# Patient Record
Sex: FEMALE | Race: WHITE | NOT HISPANIC OR LATINO | Employment: UNEMPLOYED | URBAN - METROPOLITAN AREA
[De-identification: names, ages, dates, MRNs, and addresses within clinical notes are randomized per-mention and may not be internally consistent; named-entity substitution may affect disease eponyms.]

---

## 2017-04-18 ENCOUNTER — TELEPHONE (OUTPATIENT)
Dept: OBSTETRICS AND GYNECOLOGY | Facility: CLINIC | Age: 32
End: 2017-04-18

## 2017-04-18 NOTE — TELEPHONE ENCOUNTER
----- Message from Chantelle Cooper sent at 4/18/2017 10:03 AM CDT -----  Contact: Patient  X _1st Request  _  2nd Request  _  3rd Request    Who:DIONNE GARZON [[id number]]    Why:Patient states she is at the end of her cycle and needs to know if she can still can be seen     What Number to Call Back:[phone]  When to Expect a call back: (Before the end of the day)   -- if call after 3:00 call back will be tomorrow.

## 2017-04-18 NOTE — TELEPHONE ENCOUNTER
Returned patients called and she informed me that she would like to reschedule her appointment for a later date and that she would call back once she looks at her schedule.

## 2018-02-21 ENCOUNTER — OFFICE VISIT (OUTPATIENT)
Dept: OBSTETRICS AND GYNECOLOGY | Facility: CLINIC | Age: 33
End: 2018-02-21
Payer: COMMERCIAL

## 2018-02-21 VITALS
HEIGHT: 60 IN | DIASTOLIC BLOOD PRESSURE: 68 MMHG | BODY MASS INDEX: 21.99 KG/M2 | SYSTOLIC BLOOD PRESSURE: 102 MMHG | WEIGHT: 112 LBS

## 2018-02-21 DIAGNOSIS — F32.81 PMDD (PREMENSTRUAL DYSPHORIC DISORDER): ICD-10-CM

## 2018-02-21 DIAGNOSIS — Z01.419 WOMEN'S ANNUAL ROUTINE GYNECOLOGICAL EXAMINATION: Primary | ICD-10-CM

## 2018-02-21 DIAGNOSIS — Z12.4 ENCOUNTER FOR PAPANICOLAOU SMEAR FOR CERVICAL CANCER SCREENING: ICD-10-CM

## 2018-02-21 DIAGNOSIS — Z80.3 FAMILY HISTORY OF BREAST CANCER: ICD-10-CM

## 2018-02-21 DIAGNOSIS — Z11.51 SCREENING FOR HPV (HUMAN PAPILLOMAVIRUS): ICD-10-CM

## 2018-02-21 PROCEDURE — 99999 PR PBB SHADOW E&M-EST. PATIENT-LVL III: CPT | Mod: PBBFAC,,, | Performed by: NURSE PRACTITIONER

## 2018-02-21 PROCEDURE — 88175 CYTOPATH C/V AUTO FLUID REDO: CPT

## 2018-02-21 PROCEDURE — 99395 PREV VISIT EST AGE 18-39: CPT | Mod: S$GLB,,, | Performed by: NURSE PRACTITIONER

## 2018-02-21 PROCEDURE — 87624 HPV HI-RISK TYP POOLED RSLT: CPT

## 2018-02-21 RX ORDER — DROSPIRENONE AND ETHINYL ESTRADIOL 0.03MG-3MG
1 KIT ORAL DAILY
Qty: 90 TABLET | Refills: 3 | Status: SHIPPED | OUTPATIENT
Start: 2018-02-21 | End: 2018-11-06

## 2018-02-21 NOTE — PROGRESS NOTES
CC: Annual  HPI: Pt is a 32 y.o.  female who presents for routine annual exam. She uses BTL for contraception. She does not want STD screening.  Reports depression and irritability 1 week prior to onset of menses which resolves once her cycle has ended.  Reports this seems to be getting harder for her and her family to manage/  Reports her cycles are much heavier since her BTL.  Reports ovulation pain as well since BTL.   She is a non- smoker.  Denies history of HTN, blood clots, or stroke.  Reports + family history of breast cancer- maternal grandmother ( at diagnosis) and maternal aunt with premenopausal diagnosis.  Pt desires genetic consult.       ROS:  GENERAL: Feeling well overall. Denies fever or chills.   SKIN: Denies rash or lesions.   HEAD: Denies head injury or headache.   NODES: Denies enlarged lymph nodes.   CHEST: Denies chest pain or shortness of breath.   CARDIOVASCULAR: Denies palpitations or left sided chest pain.   ABDOMEN: No abdominal pain, constipation, diarrhea, nausea, vomiting or rectal bleeding.   URINARY: No dysuria, hematuria, or burning on urination.  REPRODUCTIVE: See HPI.   BREASTS: Denies pain, lumps, or nipple discharge.   HEMATOLOGIC: No easy bruisability or excessive bleeding.   MUSCULOSKELETAL: Denies joint pain or swelling.   NEUROLOGIC: Denies syncope or weakness.   PSYCHIATRIC: Denies depression, anxiety or mood swings.    PE:   APPEARANCE: Well nourished, well developed, White female in no acute distress.  NODES: no cervical, supraclavicular, or inguinal lymphadenopathy  BREASTS: Symmetrical, no skin changes or visible lesions. No palpable masses, nipple discharge or adenopathy bilaterally.  ABDOMEN: Soft. No tenderness or masses. No distention. No hernias palpated. No CVA tenderness.  VULVA: No lesions. Normal external female genitalia.  URETHRAL MEATUS: Normal size and location, no lesions, no prolapse.  URETHRA: No masses, tenderness, or prolapse.  VAGINA: Moist. No  lesions or lacerations noted. No abnormal discharge present. No odor present.   CERVIX: No lesions or discharge. No cervical motion tenderness.   UTERUS: Normal size, regular shape, mobile, non-tender.  ADNEXA: No tenderness. No fullness or masses palpated in the adnexal regions.   ANUS PERINEUM: Normal.      Diagnosis:  1. Women's annual routine gynecological examination    2. Screening for HPV (human papillomavirus)    3. Encounter for Papanicolaou smear for cervical cancer screening    4. PMDD (premenstrual dysphoric disorder)    5. Family history of breast cancer        Plan:     Orders Placed This Encounter    HPV High Risk Genotypes, PCR    Ambulatory Referral to [facility]    Liquid-based pap smear, screening    drospirenone-ethinyl estradiol (SILVIA) 3-0.03 mg per tablet     Plan:   Pap  HPV  Discussed continuous OCPs for 3 months and then to have a cycle to have less cycles per year d/t depression with menstruation.    Offered SSRI to help with PMDD symptoms- pt declines at present  Referral to [facility] with Dr. Sood for genetics consult        Patient was counseled today on the new ACS guidelines for cervical cytology screening as well as the current recommendations for breast cancer screening. She was counseled to follow up with her PCP for other routine health maintenance. Counseling session lasted approximately 10 minutes, and all her questions were answered.    Follow-up with me in 1 year for routine exam    MORRIS Weiss

## 2018-02-22 ENCOUNTER — TELEPHONE (OUTPATIENT)
Dept: OBSTETRICS AND GYNECOLOGY | Facility: CLINIC | Age: 33
End: 2018-02-22

## 2018-02-22 NOTE — TELEPHONE ENCOUNTER
Called Shena Gomez  to schedule an appointment for Genetic Counseling/Testing as suggested by HERMAN Taylor NP.  Patient agreeable to  April 19th at 11am.      Explained to Shena her appointment will last about an hour and during that time Dr. Sood will review her medical history as well as her family medical history, especially any breast, ovarian or colon cancers.  She should be familiar with the type of cancer each family member had as well as the age at which they were diagnosed.  If she has access to medical records pertaining to their cancer diagnosis, testing, treatment and/or recurrence that would be helpful to bring to the appointment.  Explained that we would then send her information to an outside company called [employer] who would have a genetic counselor contact her to continue the process.  Pt verbalized understanding.    Reminder letter printed and sent via mail.

## 2018-02-26 LAB
HPV16 AG SPEC QL: NEGATIVE
HPV16+18+H RISK 12 DNA CVX-IMP: NEGATIVE
HPV18 DNA SPEC QL NAA+PROBE: NEGATIVE

## 2018-04-09 ENCOUNTER — PATIENT MESSAGE (OUTPATIENT)
Dept: OBSTETRICS AND GYNECOLOGY | Facility: CLINIC | Age: 33
End: 2018-04-09

## 2018-04-09 DIAGNOSIS — F32.81 PMDD (PREMENSTRUAL DYSPHORIC DISORDER): Primary | ICD-10-CM

## 2018-04-09 RX ORDER — FLUOXETINE 10 MG/1
10 CAPSULE ORAL DAILY
Qty: 30 CAPSULE | Refills: 11 | Status: SHIPPED | OUTPATIENT
Start: 2018-04-09 | End: 2018-11-06

## 2018-07-10 ENCOUNTER — PATIENT MESSAGE (OUTPATIENT)
Dept: OBSTETRICS AND GYNECOLOGY | Facility: CLINIC | Age: 33
End: 2018-07-10

## 2018-07-10 DIAGNOSIS — R30.0 DYSURIA: Primary | ICD-10-CM

## 2018-07-10 RX ORDER — NITROFURANTOIN 25; 75 MG/1; MG/1
100 CAPSULE ORAL 2 TIMES DAILY
Qty: 14 CAPSULE | Refills: 0 | Status: SHIPPED | OUTPATIENT
Start: 2018-07-10 | End: 2018-07-17

## 2018-11-04 ENCOUNTER — PATIENT MESSAGE (OUTPATIENT)
Dept: OBSTETRICS AND GYNECOLOGY | Facility: CLINIC | Age: 33
End: 2018-11-04

## 2018-11-04 DIAGNOSIS — R10.2 PELVIC PAIN: Primary | ICD-10-CM

## 2018-11-05 NOTE — TELEPHONE ENCOUNTER
Placed order for pelvic US per pt's request and after speaking with Adalgisa. Scheduled on 11/15 at 9:45am with Adalgisa's appt afterwards at 11:20am. Will contact pt about us appt.

## 2018-11-06 ENCOUNTER — TELEPHONE (OUTPATIENT)
Dept: OBSTETRICS AND GYNECOLOGY | Facility: CLINIC | Age: 33
End: 2018-11-06

## 2018-11-06 ENCOUNTER — OFFICE VISIT (OUTPATIENT)
Dept: PRIMARY CARE CLINIC | Facility: CLINIC | Age: 33
End: 2018-11-06
Payer: COMMERCIAL

## 2018-11-06 VITALS
SYSTOLIC BLOOD PRESSURE: 96 MMHG | HEIGHT: 60 IN | WEIGHT: 107.13 LBS | TEMPERATURE: 98 F | HEART RATE: 95 BPM | BODY MASS INDEX: 21.03 KG/M2 | DIASTOLIC BLOOD PRESSURE: 61 MMHG | RESPIRATION RATE: 18 BRPM | OXYGEN SATURATION: 97 %

## 2018-11-06 DIAGNOSIS — J32.9 SINUSITIS, UNSPECIFIED CHRONICITY, UNSPECIFIED LOCATION: ICD-10-CM

## 2018-11-06 DIAGNOSIS — Z00.00 ROUTINE PHYSICAL EXAMINATION: ICD-10-CM

## 2018-11-06 DIAGNOSIS — F41.9 ANXIETY: Primary | ICD-10-CM

## 2018-11-06 DIAGNOSIS — Z13.220 LIPID SCREENING: ICD-10-CM

## 2018-11-06 PROCEDURE — 99214 OFFICE O/P EST MOD 30 MIN: CPT | Mod: S$GLB,,, | Performed by: NURSE PRACTITIONER

## 2018-11-06 PROCEDURE — 99999 PR PBB SHADOW E&M-EST. PATIENT-LVL IV: CPT | Mod: PBBFAC,,, | Performed by: NURSE PRACTITIONER

## 2018-11-06 PROCEDURE — 3008F BODY MASS INDEX DOCD: CPT | Mod: CPTII,S$GLB,, | Performed by: NURSE PRACTITIONER

## 2018-11-06 RX ORDER — AZITHROMYCIN 250 MG/1
TABLET, FILM COATED ORAL
Qty: 6 TABLET | Refills: 0 | Status: SHIPPED | OUTPATIENT
Start: 2018-11-06 | End: 2018-11-11

## 2018-11-06 NOTE — PROGRESS NOTES
Chief Complaint  Chief Complaint   Patient presents with    Dizziness    Sinus Problem    loss of appetite       HPI  Shena Gomez is a 33 y.o. female with multiple medical diagnoses as listed in the medical history and problem list that presents for dizziness, sinus pressure.    Dizziness-patient reports a decreased appetite over the last 3 days and intermittent dizziness.  States that her most recent episode was yesterday when she noticed that she was starting to feel dizzy and lightheaded.  Called her  partner who is ENT who checked her blood sugar which was fine her blood pressure which was also fine.  Reports decreased appetite accompanying fatigue.  Patient also reports intermittent sinus pressure and chronic sinusitis.  Is concerned that her sinus pressures causing dizziness and lightheadedness.  No associated viral URI symptoms.  No sore throat, cough, nausea, vomiting, diarrhea.  No abdominal pain. No sick contacts.  Patient with history of anxiety-OCD-patient previously placed on Zoloft but did not ever start because she did not want to take a medication daily.  Reports increased stressors with children starting school, started a new job.  No family support.  No dysphoria, anhedonia, withdrawal.  No suicidal homicidal ideation.  Of note-patient with a history of anemia in 2013 in 2014.  No recent lab work.     Pre menstrual dysphoric disorder-patient with a history of severe mood swings, irritability surrounding menstrual period monthly.  Describes painful, heavy periods associated with ovulation and menstruation.  Previously placed on Prozac 10 mg by her Ob but did not ever start due to questionable side effects.  Questioning whether not she should start at this point.  Was also previously placed on oral contraceptive pill but stopped due to questionable side effects.      PAST MEDICAL HISTORY:  History reviewed. No pertinent past medical history.    PAST SURGICAL HISTORY:  Past Surgical History:    Procedure Laterality Date    CERCLAGE, CERVIX N/A 2013    Performed by Bossman Kowalski [facility], [facility] at [facility]     SECTION       SECTION N/A 2014    Performed by Barrera Martínez Jr., MD at [facility] L[facility]D       SOCIAL HISTORY:  Social History     Socioeconomic History    Marital status:      Spouse name: Not on file    Number of children: Not on file    Years of education: Not on file    Highest education level: Not on file   Social Needs    Financial resource strain: Not on file    Food insecurity - worry: Not on file    Food insecurity - inability: Not on file    Transportation needs - medical: Not on file    Transportation needs - non-medical: Not on file   Occupational History    Not on file   Tobacco Use    Smoking status: Never Smoker    Smokeless tobacco: Never Used   Substance and Sexual Activity    Alcohol use: No    Drug use: No    Sexual activity: Yes     Partners: Male     Birth control/protection: None   Other Topics Concern    Not on file   Social History Narrative    Not on file       FAMILY HISTORY:  Family History   Problem Relation Age of Onset    Breast cancer Maternal Grandmother     Cancer Neg Hx     Colon cancer Neg Hx     Diabetes Neg Hx     Eclampsia Neg Hx     Miscarriages / Stillbirths Neg Hx     Hypertension Neg Hx     Ovarian cancer Neg Hx      labor Neg Hx     Stroke Neg Hx        ALLERGIES AND MEDICATIONS: updated and reviewed.  Review of patient's allergies indicates:   Allergen Reactions    Celestone [betamethasone] Palpitations     Current Outpatient Medications   Medication Sig Dispense Refill    azithromycin (Z-GEE) 250 MG tablet Take 2 tablets by mouth on day 1; Take 1 tablet by mouth on days 2-5 6 tablet 0     No current facility-administered medications for this visit.          ROS  Review of Systems   Constitutional: Positive for fatigue. Negative for chills and fever.   HENT: Positive for sinus pressure.  Negative for congestion, rhinorrhea and sore throat.    Respiratory: Negative for cough, chest tightness and shortness of breath.    Cardiovascular: Negative for chest pain and palpitations.   Gastrointestinal: Negative for blood in stool, diarrhea, nausea and vomiting.   Genitourinary: Positive for menstrual problem. Negative for dysuria, frequency, hematuria and urgency.   Musculoskeletal: Negative for arthralgias and back pain.   Skin: Negative for rash and wound.   Neurological: Positive for dizziness and light-headedness. Negative for headaches.   Psychiatric/Behavioral: Negative for dysphoric mood and sleep disturbance. The patient is nervous/anxious.          PHYSICAL EXAM  Vitals:    11/06/18 1458   BP: 96/61   BP Location: Right arm   Patient Position: Sitting   BP Method: Medium (Automatic)   Pulse: 95   Resp: 18   Temp: 98.3 °F (36.8 °C)   TempSrc: Oral   SpO2: 97%   Weight: 48.6 kg (107 lb 1.6 oz)   Height: 5' (1.524 m)    Body mass index is 20.92 kg/m².  Weight: 48.6 kg (107 lb 1.6 oz)   Height: 5' (152.4 cm)     Physical Exam   Constitutional: She is oriented to person, place, and time. She appears well-developed and well-nourished.   HENT:   Head: Normocephalic.   Right Ear: Tympanic membrane normal.   Left Ear: Tympanic membrane normal.   Mouth/Throat: Uvula is midline, oropharynx is clear and moist and mucous membranes are normal.   Eyes: Conjunctivae are normal.   Cardiovascular: Normal rate, regular rhythm, normal heart sounds and normal pulses.   No murmur heard.  Pulses:       Radial pulses are 2+ on the right side, and 2+ on the left side.   No LE swelling noted   Pulmonary/Chest: Effort normal and breath sounds normal. She has no wheezes.   Abdominal: Soft. Bowel sounds are normal. There is no tenderness.   Musculoskeletal: She exhibits no edema.   Lymphadenopathy:     She has no cervical adenopathy.   Neurological: She is alert and oriented to person, place, and time.   Skin: Skin is warm and  dry. No rash noted.   Psychiatric: She has a normal mood and affect.         Health Maintenance       Date Due Completion Date    Lipid Panel 1986 ---    TETANUS VACCINE 08/06/2004 ---    Influenza Vaccine 08/01/2018 11/18/2013    Pap Smear with HPV Cotest 02/21/2021 2/21/2018            Assessment & Plan    Shena was seen today for dizziness, sinus problem and loss of appetite.    Diagnoses and all orders for this visit:    Anxiety  -     TSH; Future  -     T4, free; Future  Discussed with patient my concern that her lightheadedness and dizziness is likely related to anxiety.  We agreed that we will treat her for sinusitis and then go from there.    Routine physical examination  -     Comprehensive metabolic panel; Future  -     CBC auto differential; Future    Sinusitis, unspecified chronicity, unspecified location  -     azithromycin (Z-GEE) 250 MG tablet; Take 2 tablets by mouth on day 1; Take 1 tablet by mouth on days 2-5  Discussed with patient that it is reasonable to treat her sinusitis with an antibiotic to determine its relationship to her new onset dizziness and lightheadedness.    Lipid screening  -     Lipid panel; Future          Follow-up: Follow-up in about 4 weeks (around 12/4/2018).

## 2018-11-07 ENCOUNTER — PATIENT MESSAGE (OUTPATIENT)
Dept: PRIMARY CARE CLINIC | Facility: CLINIC | Age: 33
End: 2018-11-07

## 2018-11-08 ENCOUNTER — TELEPHONE (OUTPATIENT)
Dept: PRIMARY CARE CLINIC | Facility: CLINIC | Age: 33
End: 2018-11-08

## 2018-11-08 ENCOUNTER — PATIENT MESSAGE (OUTPATIENT)
Dept: PRIMARY CARE CLINIC | Facility: CLINIC | Age: 33
End: 2018-11-08

## 2018-11-08 NOTE — TELEPHONE ENCOUNTER
----- Message from Soheila Herrera sent at 11/8/2018  9:57 AM CST -----  Contact: self  Type:  Test Results    Who Called:  self  Name of Test (Lab/Mammo/Etc):  Urine test  Date of Test:  11/07/18  Ordering Provider:  Marisel Mcdaniels  Where the test was performed:  [facility]  Best Call Back Number:  [phone]  Additional Information:  Please call patient with results. Thanks!

## 2018-11-08 NOTE — TELEPHONE ENCOUNTER
Spoke with patient notified her that as soon as we get the results of her urine culture I will let her know. We will call her Monday at the latest. States she is having kidney pain. Advised her if she develops a fever or severe pain she needs to go to the ER. States her understanding

## 2018-11-08 NOTE — TELEPHONE ENCOUNTER
----- Message from Tanesha Hwang sent at 11/8/2018 12:39 PM CST -----  Contact: Patient  Type:  Test Results    Who Called:  Shena, patient  Name of Test (Lab/Mammo/Etc):  Urinalysis  Date of Test:  11/07/2018  Ordering Provider:  Marisel Mcdaniels NP  Where the test was performed:  Office  Best Call Back Number:  [phone]  Additional Information:  Please call her. Thanks.

## 2018-11-13 ENCOUNTER — PATIENT MESSAGE (OUTPATIENT)
Dept: PRIMARY CARE CLINIC | Facility: CLINIC | Age: 33
End: 2018-11-13

## 2018-11-13 NOTE — TELEPHONE ENCOUNTER
Spoke with patient discussed her symptoms.  Complains of epigastric burning below the sternum above the umbilicus.  Associated belching.  Clear reports occasional constipation.  Started on Zantac 300 mg q.h.s. x1 week to evaluate for improvement.

## 2019-01-17 ENCOUNTER — PATIENT MESSAGE (OUTPATIENT)
Dept: OBSTETRICS AND GYNECOLOGY | Facility: CLINIC | Age: 34
End: 2019-01-17

## 2019-01-23 ENCOUNTER — PATIENT MESSAGE (OUTPATIENT)
Dept: OBSTETRICS AND GYNECOLOGY | Facility: CLINIC | Age: 34
End: 2019-01-23

## 2019-01-23 DIAGNOSIS — R30.0 DYSURIA: Primary | ICD-10-CM

## 2019-01-25 ENCOUNTER — PATIENT MESSAGE (OUTPATIENT)
Dept: OBSTETRICS AND GYNECOLOGY | Facility: CLINIC | Age: 34
End: 2019-01-25

## 2019-01-25 NOTE — TELEPHONE ENCOUNTER
Spoke with pt pt stated she was passing kidney stones and wanted to know if she should follow up with her pcp. Informed pt that if she was in pain she can follow up with her pcp or urology. Pt stated she was not in pain and was just going to let it pass because she did not want to go see a doctor and rack up on medical bills. Pt stated she wanted to speak to Adalgisa Davison. Informed pt that Adalgisa was out of the office today. Pt stated she will wait for adalgisa call when ever she come back.

## 2019-01-28 ENCOUNTER — TELEPHONE (OUTPATIENT)
Dept: OBSTETRICS AND GYNECOLOGY | Facility: CLINIC | Age: 34
End: 2019-01-28

## 2019-01-29 ENCOUNTER — PATIENT MESSAGE (OUTPATIENT)
Dept: PRIMARY CARE CLINIC | Facility: CLINIC | Age: 34
End: 2019-01-29

## 2019-01-29 DIAGNOSIS — N20.0 NEPHROLITHIASIS: Primary | ICD-10-CM

## 2019-01-29 NOTE — TELEPHONE ENCOUNTER
I would be happy to order a renal ultrasound. This is non-invasive and will determine if any additional stones are left in the kidney to pass. I have ordered. We will call you to schedule.

## 2019-01-29 NOTE — TELEPHONE ENCOUNTER
Spoke with patient and explained that Marisel has ordered the u/s . She stated that she will just give scheduling a call tomorrow in regards to trying to schedule both the u/s that Marisel ordered and the u/s that her gynecologist ordered possibly the same time.

## 2019-01-30 ENCOUNTER — PATIENT MESSAGE (OUTPATIENT)
Dept: OBSTETRICS AND GYNECOLOGY | Facility: CLINIC | Age: 34
End: 2019-01-30

## 2019-01-31 ENCOUNTER — TELEPHONE (OUTPATIENT)
Dept: OBSTETRICS AND GYNECOLOGY | Facility: CLINIC | Age: 34
End: 2019-01-31

## 2019-02-04 ENCOUNTER — PATIENT MESSAGE (OUTPATIENT)
Dept: OBSTETRICS AND GYNECOLOGY | Facility: CLINIC | Age: 34
End: 2019-02-04

## 2019-02-05 ENCOUNTER — TELEPHONE (OUTPATIENT)
Dept: OBSTETRICS AND GYNECOLOGY | Facility: CLINIC | Age: 34
End: 2019-02-05

## 2019-02-05 ENCOUNTER — PATIENT MESSAGE (OUTPATIENT)
Dept: PRIMARY CARE CLINIC | Facility: CLINIC | Age: 34
End: 2019-02-05

## 2019-02-05 DIAGNOSIS — N13.30 PYELOCALIECTASIS: Primary | ICD-10-CM

## 2019-02-05 NOTE — TELEPHONE ENCOUNTER
----- Message from Mahnaz Tripathi sent at 2/5/2019  3:06 PM CST -----  Contact: self   Patient is needing to schedule her wwe visit. Her pelvic US revealed multiple uterine fibroids. The patient was advised to be seen by  to further discuss her results. The patient can be reached at [phone].      Spoke with patient to schedule patient annual. Patient verbalized and understand

## 2019-02-05 NOTE — TELEPHONE ENCOUNTER
Called pt to discuss her pelvic US revealed multiple uterine fibroids.  Discussed no abnormalities seen on the uterus.  Discussed the fibroids may be the source of the pelvic pain.  Pt to see Dr. Sood for annual exam and will discuss with her further.

## 2019-02-06 ENCOUNTER — PATIENT MESSAGE (OUTPATIENT)
Dept: OBSTETRICS AND GYNECOLOGY | Facility: CLINIC | Age: 34
End: 2019-02-06

## 2019-02-06 ENCOUNTER — PATIENT MESSAGE (OUTPATIENT)
Dept: PRIMARY CARE CLINIC | Facility: CLINIC | Age: 34
End: 2019-02-06

## 2019-02-06 ENCOUNTER — TELEPHONE (OUTPATIENT)
Dept: OBSTETRICS AND GYNECOLOGY | Facility: CLINIC | Age: 34
End: 2019-02-06

## 2019-02-06 DIAGNOSIS — N20.0 NEPHROLITHIASIS: Primary | ICD-10-CM

## 2019-02-06 RX ORDER — TAMSULOSIN HYDROCHLORIDE 0.4 MG/1
0.4 CAPSULE ORAL DAILY
Qty: 30 CAPSULE | Refills: 11 | Status: SHIPPED | OUTPATIENT
Start: 2019-02-06 | End: 2019-02-19 | Stop reason: SDUPTHER

## 2019-02-08 ENCOUNTER — PATIENT MESSAGE (OUTPATIENT)
Dept: PRIMARY CARE CLINIC | Facility: CLINIC | Age: 34
End: 2019-02-08

## 2019-02-11 ENCOUNTER — TELEPHONE (OUTPATIENT)
Dept: OBSTETRICS AND GYNECOLOGY | Facility: CLINIC | Age: 34
End: 2019-02-11

## 2019-02-11 ENCOUNTER — PATIENT MESSAGE (OUTPATIENT)
Dept: OBSTETRICS AND GYNECOLOGY | Facility: CLINIC | Age: 34
End: 2019-02-11

## 2019-02-12 ENCOUNTER — PATIENT MESSAGE (OUTPATIENT)
Dept: PRIMARY CARE CLINIC | Facility: CLINIC | Age: 34
End: 2019-02-12

## 2019-02-12 ENCOUNTER — PATIENT MESSAGE (OUTPATIENT)
Dept: OBSTETRICS AND GYNECOLOGY | Facility: CLINIC | Age: 34
End: 2019-02-12

## 2019-02-12 NOTE — TELEPHONE ENCOUNTER
Spoke with patient in regards to CT scan results. Patient verbalized understanding. Patient just picked up the prescription for Flomax. Do you still want her to take it? Please advise.

## 2019-02-13 ENCOUNTER — PATIENT MESSAGE (OUTPATIENT)
Dept: PRIMARY CARE CLINIC | Facility: CLINIC | Age: 34
End: 2019-02-13

## 2019-02-15 ENCOUNTER — PATIENT MESSAGE (OUTPATIENT)
Dept: PRIMARY CARE CLINIC | Facility: CLINIC | Age: 34
End: 2019-02-15

## 2019-02-19 ENCOUNTER — OFFICE VISIT (OUTPATIENT)
Dept: PRIMARY CARE CLINIC | Facility: CLINIC | Age: 34
End: 2019-02-19
Payer: COMMERCIAL

## 2019-02-19 VITALS
RESPIRATION RATE: 16 BRPM | HEART RATE: 83 BPM | OXYGEN SATURATION: 99 % | TEMPERATURE: 98 F | BODY MASS INDEX: 21.2 KG/M2 | HEIGHT: 60 IN | DIASTOLIC BLOOD PRESSURE: 61 MMHG | SYSTOLIC BLOOD PRESSURE: 97 MMHG | WEIGHT: 108 LBS

## 2019-02-19 DIAGNOSIS — R07.9 CHEST PAIN, UNSPECIFIED TYPE: Primary | ICD-10-CM

## 2019-02-19 DIAGNOSIS — R10.9 FLANK PAIN, ACUTE: ICD-10-CM

## 2019-02-19 DIAGNOSIS — N20.0 NEPHROLITHIASIS: ICD-10-CM

## 2019-02-19 DIAGNOSIS — S16.1XXA ACUTE STRAIN OF NECK MUSCLE, INITIAL ENCOUNTER: ICD-10-CM

## 2019-02-19 LAB
BILIRUB SERPL-MCNC: NEGATIVE MG/DL
BLOOD URINE, POC: ABNORMAL
COLOR, POC UA: YELLOW
GLUCOSE UR QL STRIP: NEGATIVE
KETONES UR QL STRIP: NEGATIVE
LEUKOCYTE ESTERASE URINE, POC: ABNORMAL
NITRITE, POC UA: NEGATIVE
PH, POC UA: 7
PROTEIN, POC: NEGATIVE
SPECIFIC GRAVITY, POC UA: 1
UROBILINOGEN, POC UA: NEGATIVE

## 2019-02-19 PROCEDURE — 99999 PR PBB SHADOW E&M-EST. PATIENT-LVL IV: CPT | Mod: PBBFAC,,, | Performed by: NURSE PRACTITIONER

## 2019-02-19 PROCEDURE — 3008F BODY MASS INDEX DOCD: CPT | Mod: CPTII,S$GLB,, | Performed by: NURSE PRACTITIONER

## 2019-02-19 PROCEDURE — 93000 EKG 12-LEAD: ICD-10-PCS | Mod: S$GLB,,, | Performed by: INTERNAL MEDICINE

## 2019-02-19 PROCEDURE — 99214 OFFICE O/P EST MOD 30 MIN: CPT | Mod: 25,S$GLB,, | Performed by: NURSE PRACTITIONER

## 2019-02-19 PROCEDURE — 3008F PR BODY MASS INDEX (BMI) DOCUMENTED: ICD-10-PCS | Mod: CPTII,S$GLB,, | Performed by: NURSE PRACTITIONER

## 2019-02-19 PROCEDURE — 99214 PR OFFICE/OUTPT VISIT, EST, LEVL IV, 30-39 MIN: ICD-10-PCS | Mod: 25,S$GLB,, | Performed by: NURSE PRACTITIONER

## 2019-02-19 PROCEDURE — 99999 PR PBB SHADOW E&M-EST. PATIENT-LVL IV: ICD-10-PCS | Mod: PBBFAC,,, | Performed by: NURSE PRACTITIONER

## 2019-02-19 PROCEDURE — 93000 ELECTROCARDIOGRAM COMPLETE: CPT | Mod: S$GLB,,, | Performed by: INTERNAL MEDICINE

## 2019-02-19 PROCEDURE — 81002 URINALYSIS NONAUTO W/O SCOPE: CPT | Mod: S$GLB,,, | Performed by: NURSE PRACTITIONER

## 2019-02-19 PROCEDURE — 81002 POCT URINE DIPSTICK WITHOUT MICROSCOPE: ICD-10-PCS | Mod: S$GLB,,, | Performed by: NURSE PRACTITIONER

## 2019-02-19 RX ORDER — TAMSULOSIN HYDROCHLORIDE 0.4 MG/1
0.4 CAPSULE ORAL DAILY
Qty: 30 CAPSULE | Refills: 11 | Status: SHIPPED | OUTPATIENT
Start: 2019-02-19 | End: 2019-12-18

## 2019-02-19 RX ORDER — CYCLOBENZAPRINE HCL 5 MG
5 TABLET ORAL 3 TIMES DAILY PRN
Qty: 28 TABLET | Refills: 0 | Status: SHIPPED | OUTPATIENT
Start: 2019-02-19 | End: 2019-02-27

## 2019-02-19 NOTE — PROGRESS NOTES
Chief Complaint  Chief Complaint   Patient presents with    Flank Pain     Pt. reports left flank pain that starts in the abdomen and radiates to flank.     Headache     Left sided pain in head that radiates into neck.       HPI    Shena Gomez is a 33 y.o. female that presents for right-sided flank pain, chest pain.    Right-sided flank pain-patient with previous reports of right-sided flank pain approximately 2 days ago, back pain with a history kidney stones in the past.  Last kidney since she passes approximately 2 weeks ago.  Presents to clinic reporting umbilical pain radiating to the right flank.  Pain rated as mild, aching, gnawing.  Intermittent.  No suprapubic pressure or UTI symptoms.  No fever or chills.      Left chest pain-patient complaining of left-sided headache radiating down left arm all the way to left hand.  Reports intermittent numbness and left fingers.  Also complaining of left-sided chest pain. Chest pain described as pressure is with no exacerbating or alleviating factors.  Chest and neck pain and generally associated in described as constant.  Patient denies shortness of breath or palpitations.    PAST MEDICAL HISTORY:  History reviewed. No pertinent past medical history.    PAST SURGICAL HISTORY:  Past Surgical History:   Procedure Laterality Date    CERCLAGE, CERVIX N/A 2013    Performed by Bossman Kowalski III, [facility] at [facility]     SECTION       SECTION N/A 2014    Performed by Barrera Martínez Jr., MD at [facility] [place] operated by [facility] L&D       SOCIAL HISTORY:  Social History     Socioeconomic History    Marital status:      Spouse name: Not on file    Number of children: Not on file    Years of education: Not on file    Highest education level: Not on file   Social Needs    Financial resource strain: Not on file    Food insecurity - worry: Not on file    Food insecurity - inability: Not on file    Transportation needs - medical: Not on file    Transportation needs -  non-medical: Not on file   Occupational History    Not on file   Tobacco Use    Smoking status: Never Smoker    Smokeless tobacco: Never Used   Substance and Sexual Activity    Alcohol use: No    Drug use: No    Sexual activity: Yes     Partners: Male     Birth control/protection: None   Other Topics Concern    Not on file   Social History Narrative    Not on file       FAMILY HISTORY:  Family History   Problem Relation Age of Onset    Breast cancer Maternal Grandmother     Cancer Neg Hx     Colon cancer Neg Hx     Diabetes Neg Hx     Eclampsia Neg Hx     Miscarriages / Stillbirths Neg Hx     Hypertension Neg Hx     Ovarian cancer Neg Hx      labor Neg Hx     Stroke Neg Hx        ALLERGIES AND MEDICATIONS: updated and reviewed.  Review of patient's allergies indicates:   Allergen Reactions    Celestone [betamethasone] Palpitations     Current Outpatient Medications   Medication Sig Dispense Refill    cyclobenzaprine (FLEXERIL) 5 MG tablet Take 1 tablet (5 mg total) by mouth 3 (three) times daily as needed for Muscle spasms. 28 tablet 0    tamsulosin (FLOMAX) 0.4 mg Cap Take 1 capsule (0.4 mg total) by mouth once daily. 30 capsule 11     No current facility-administered medications for this visit.          ROS  Review of Systems   Constitutional: Negative for chills and fever.   HENT: Negative for ear pain, postnasal drip and sinus pain.    Respiratory: Positive for chest tightness. Negative for cough and shortness of breath.    Cardiovascular: Positive for chest pain.   Gastrointestinal: Negative for diarrhea, nausea and vomiting.   Genitourinary: Positive for flank pain. Negative for difficulty urinating, dysuria, frequency and urgency.   Musculoskeletal: Positive for neck pain and neck stiffness.   Neurological: Positive for numbness and headaches. Negative for dizziness and weakness.           PHYSICAL EXAM  Vitals:    19 1447   BP: 97/61   BP Location: Right arm   Patient  Position: Sitting   BP Method: Medium (Automatic)   Pulse: 83   Resp: 16   Temp: 98.1 °F (36.7 °C)   TempSrc: Oral   SpO2: 99%   Weight: 49 kg (108 lb)   Height: 5' (1.524 m)    Body mass index is 21.09 kg/m².  Weight: 49 kg (108 lb)   Height: 5' (152.4 cm)     Physical Exam   Constitutional: She is oriented to person, place, and time. She appears well-developed and well-nourished.   HENT:   Head: Normocephalic.   Right Ear: Tympanic membrane normal.   Left Ear: Tympanic membrane normal.   Mouth/Throat: Uvula is midline, oropharynx is clear and moist and mucous membranes are normal.   Eyes: Conjunctivae are normal.   Cardiovascular: Normal rate, regular rhythm, normal heart sounds and normal pulses.   No murmur heard.  Pulses:       Radial pulses are 2+ on the right side, and 2+ on the left side.   No LE swelling noted   Pulmonary/Chest: Effort normal and breath sounds normal. She has no wheezes.   Abdominal: Soft. Bowel sounds are normal. There is no tenderness. There is CVA tenderness.   Musculoskeletal: She exhibits no edema.   Pain elicited with right lateral rotation of neck.    Lymphadenopathy:     She has no cervical adenopathy.   Neurological: She is alert and oriented to person, place, and time.   Skin: Skin is warm and dry. No rash noted.   Psychiatric: She has a normal mood and affect.         Health Maintenance       Date Due Completion Date    TETANUS VACCINE 08/06/2004 ---    Influenza Vaccine 08/01/2018 11/18/2013    Pap Smear with HPV Cotest 02/21/2021 2/21/2018            Assessment & Plan    Problem List Items Addressed This Visit        Unprioritized    Nephrolithiasis    Relevant Medications    tamsulosin (FLOMAX) 0.4 mg Cap      Other Visit Diagnoses     Chest pain, unspecified type    -  Primary    Relevant Orders    IN OFFICE EKG 12-LEAD (to Muse)    Flank pain, acute        Relevant Orders    POCT URINE DIPSTICK WITHOUT MICROSCOPE (Completed)    Acute strain of neck muscle, initial encounter         Relevant Medications    cyclobenzaprine (FLEXERIL) 5 MG tablet          Follow-up: Follow-up if symptoms worsen or fail to improve.    Marisel Mcdaniels    Medication List with Changes/Refills   New Medications    CYCLOBENZAPRINE (FLEXERIL) 5 MG TABLET    Take 1 tablet (5 mg total) by mouth 3 (three) times daily as needed for Muscle spasms.   Changed and/or Refilled Medications    Modified Medication Previous Medication    TAMSULOSIN (FLOMAX) 0.4 MG CAP tamsulosin (FLOMAX) 0.4 mg Cap       Take 1 capsule (0.4 mg total) by mouth once daily.    Take 1 capsule (0.4 mg total) by mouth once daily.   Discontinued Medications    RANITIDINE (ZANTAC) 300 MG TABLET    Take 1 tablet (300 mg total) by mouth every evening.

## 2019-02-27 ENCOUNTER — OFFICE VISIT (OUTPATIENT)
Dept: OBSTETRICS AND GYNECOLOGY | Facility: CLINIC | Age: 34
End: 2019-02-27
Attending: OBSTETRICS & GYNECOLOGY
Payer: COMMERCIAL

## 2019-02-27 VITALS
DIASTOLIC BLOOD PRESSURE: 72 MMHG | WEIGHT: 107.13 LBS | SYSTOLIC BLOOD PRESSURE: 110 MMHG | HEIGHT: 60 IN | BODY MASS INDEX: 21.03 KG/M2

## 2019-02-27 DIAGNOSIS — Z01.419 ENCOUNTER FOR GYNECOLOGICAL EXAMINATION WITHOUT ABNORMAL FINDING: Primary | ICD-10-CM

## 2019-02-27 DIAGNOSIS — F32.81 PMDD (PREMENSTRUAL DYSPHORIC DISORDER): ICD-10-CM

## 2019-02-27 DIAGNOSIS — D21.9 FIBROIDS: ICD-10-CM

## 2019-02-27 PROCEDURE — 99395 PR PREVENTIVE VISIT,EST,18-39: ICD-10-PCS | Mod: S$GLB,,, | Performed by: OBSTETRICS & GYNECOLOGY

## 2019-02-27 PROCEDURE — 99395 PREV VISIT EST AGE 18-39: CPT | Mod: S$GLB,,, | Performed by: OBSTETRICS & GYNECOLOGY

## 2019-02-27 RX ORDER — FLUOXETINE 10 MG/1
CAPSULE ORAL
Qty: 28 CAPSULE | Refills: 6 | Status: SHIPPED | OUTPATIENT
Start: 2019-02-27 | End: 2019-11-21

## 2019-02-27 NOTE — PROGRESS NOTES
SUBJECTIVE:   33 y.o. female   for annual routine   checkup. Patient's last menstrual period was 2019..  She reports that she is having issues with her mood, irritability, emotional and agitated easily the week before her cycle. She reports that she has OCD and it is worse with her period. She does not want to blunt her OCD. She has questions about her fibroids. She reports her periods are heavy on the first day and she has cramping. .        History reviewed. No pertinent past medical history.  Past Surgical History:   Procedure Laterality Date    CERCLAGE, CERVIX N/A 2013    Performed by Bossman Kowalski III, MD at [facility] L&D     SECTION       SECTION N/A 2014    Performed by Barrera Martínez Jr., MD at [facility] L&D     Social History     Socioeconomic History    Marital status:      Spouse name: Not on file    Number of children: Not on file    Years of education: Not on file    Highest education level: Not on file   Social Needs    Financial resource strain: Not on file    Food insecurity - worry: Not on file    Food insecurity - inability: Not on file    Transportation needs - medical: Not on file    Transportation needs - non-medical: Not on file   Occupational History    Not on file   Tobacco Use    Smoking status: Never Smoker    Smokeless tobacco: Never Used   Substance and Sexual Activity    Alcohol use: No    Drug use: No    Sexual activity: Yes     Partners: Male     Birth control/protection: None   Other Topics Concern    Not on file   Social History Narrative    Not on file     Family History   Problem Relation Age of Onset    Breast cancer Maternal Grandmother 68    Breast cancer Maternal Aunt 56    Cancer Neg Hx     Colon cancer Neg Hx     Diabetes Neg Hx     Eclampsia Neg Hx     Miscarriages / Stillbirths Neg Hx     Hypertension Neg Hx     Ovarian cancer Neg Hx      labor Neg Hx     Stroke Neg Hx      OB History     Para Term  AB Living   5 4 3 1 1 3   SAB TAB Ectopic Multiple Live Births   1       1      # Outcome Date GA Lbr Reginald/2nd Weight Sex Delivery Anes PTL Lv   5 Term 14 39w1d  3.195 kg (7 lb 0.7 oz) F CS-LTranv Spinal N GUI   4  2012 22w0d  0.709 kg (1 lb 9 oz) M       3 Term 09/10/08 38w0d  3.629 kg (8 lb) M CS-LTranv EPI     2 Term 05 42w0d  3.345 kg (7 lb 6 oz) M CS-LTranv EPI     1 SAB                       Current Outpatient Medications   Medication Sig Dispense Refill    tamsulosin (FLOMAX) 0.4 mg Cap Take 1 capsule (0.4 mg total) by mouth once daily. 30 capsule 11     No current facility-administered medications for this visit.      Allergies: Celestone [betamethasone]     The ASCVD Risk score (Valdez BENNY Jr., et al., 2013) failed to calculate for the following reasons:    The 2013 ASCVD risk score is only valid for ages 40 to 79      ROS:  Constitutional: no weight loss, weight gain, fever, fatigue  Eyes:  No vision changes, glasses/contacts  ENT/Mouth: No ulcers, sinus problems, ears ringing, headache  Cardiovascular: No inability to lie flat, chest pain, exercise intolerance, swelling, heart palpitations  Respiratory: No wheezing, coughing blood, shortness of breath, or cough  Gastrointestinal: No diarrhea, bloody stool, nausea/vomiting, constipation, gas, hemorrhoids  Genitourinary: No blood in urine, painful urination, urgency of urination, frequency of urination, incomplete emptying, incontinence, abnormal bleeding, +painful periods, heavy periods, vaginal discharge, vaginal odor, painful intercourse, sexual problems, bleeding after intercourse.  Musculoskeletal: No muscle weakness  Skin/Breast: No painful breasts, nipple discharge, masses, rash, ulcers  Neurological: No passing out, seizures, numbness, headache  Endocrine: No diabetes, hypothyroid, hyperthyroid, hot flashes, hair loss, abnormal hair growth, acne  Psychiatric: No depression, crying, +mood  swings  Hematologic: No bruises, bleeding, swollen lymph nodes, anemia.      Physical Exam:   Constitutional: She is oriented to person, place, and time. She appears well-developed and well-nourished.      Neck: Normal range of motion. No tracheal deviation present. No thyromegaly present.    Cardiovascular: Exam reveals no edema.     Pulmonary/Chest: Effort normal. She exhibits no mass, no tenderness, no deformity and no retraction. Right breast exhibits no inverted nipple, no mass, no nipple discharge, no skin change, no tenderness, presence, no bleeding and no swelling. Left breast exhibits no inverted nipple, no mass, no nipple discharge, no skin change, no tenderness, presence, no bleeding and no swelling. Breasts are symmetrical.        Abdominal: Soft. She exhibits no distension and no mass. There is no tenderness. There is no rebound and no guarding. No hernia. Hernia confirmed negative in the left inguinal area.     Genitourinary: Vagina normal and uterus normal. Rectal exam shows no external hemorrhoid. There is no rash, tenderness or lesion on the right labia. There is no rash, tenderness or lesion on the left labia. Uterus is hosting fibroids. Uterus is not deviated. Cervix is normal. No no adexnal prolapse. Right adnexum displays no mass, no tenderness and no fullness. Left adnexum displays no mass, no tenderness and no fullness. No tenderness, bleeding, rectocele, cystocele or unspecified prolapse of vaginal walls in the vagina. No vaginal discharge found. Cervix exhibits no motion tenderness, no discharge and no friability.        Uterus Size: 8 cm   Musculoskeletal: Normal range of motion and moves all extremeties. She exhibits no edema.      Lymphadenopathy:        Right: No inguinal adenopathy present.        Left: No inguinal adenopathy present.    Neurological: She is alert and oriented to person, place, and time.    Skin: No rash noted. No erythema. No pallor.    Psychiatric: She has a normal  mood and affect. Her behavior is normal. Judgment and thought content normal.         ASSESSMENT:   well woman  Dysmenorrhea  Fibroids  PMDD  PLAN:   Counseled patient on risks/benefits of taking  Prozac for 14 days each month for PMDD  Counseled her on fibroids- hysterectomy discussed- uterus is not enlarged  return annually or prn

## 2019-02-28 ENCOUNTER — PATIENT MESSAGE (OUTPATIENT)
Dept: OBSTETRICS AND GYNECOLOGY | Facility: CLINIC | Age: 34
End: 2019-02-28

## 2019-03-11 ENCOUNTER — PATIENT MESSAGE (OUTPATIENT)
Dept: PRIMARY CARE CLINIC | Facility: CLINIC | Age: 34
End: 2019-03-11

## 2019-03-25 ENCOUNTER — PATIENT MESSAGE (OUTPATIENT)
Dept: PRIMARY CARE CLINIC | Facility: CLINIC | Age: 34
End: 2019-03-25

## 2019-03-26 ENCOUNTER — PATIENT MESSAGE (OUTPATIENT)
Dept: OBSTETRICS AND GYNECOLOGY | Facility: CLINIC | Age: 34
End: 2019-03-26

## 2019-03-29 ENCOUNTER — PATIENT MESSAGE (OUTPATIENT)
Dept: PRIMARY CARE CLINIC | Facility: CLINIC | Age: 34
End: 2019-03-29

## 2019-03-29 DIAGNOSIS — N20.0 NEPHROLITHIASIS: Primary | ICD-10-CM

## 2019-04-01 ENCOUNTER — TELEPHONE (OUTPATIENT)
Dept: PRIMARY CARE CLINIC | Facility: CLINIC | Age: 34
End: 2019-04-01

## 2019-04-01 ENCOUNTER — TELEPHONE (OUTPATIENT)
Dept: UROLOGY | Facility: CLINIC | Age: 34
End: 2019-04-01

## 2019-04-01 ENCOUNTER — PATIENT MESSAGE (OUTPATIENT)
Dept: PRIMARY CARE CLINIC | Facility: CLINIC | Age: 34
End: 2019-04-01

## 2019-04-01 DIAGNOSIS — N20.0 NEPHROLITHIASIS: Primary | ICD-10-CM

## 2019-04-01 NOTE — TELEPHONE ENCOUNTER
Spoke with patient.  Patient offered several appts this week.  Unable to come in at available times.  Patient accepted appt on 4/11.  Appt made and appt letter mailed

## 2019-04-01 NOTE — TELEPHONE ENCOUNTER
----- Message from Kyra Soares sent at 4/1/2019 11:07 AM CDT -----  Contact: Patient  Type: Needs Medical Advice    Who Called:  Shena Leach Call Back Number: [phone]  Additional Information:Patient has some questions in regards to her last visit.Please call back and advise.

## 2019-04-01 NOTE — TELEPHONE ENCOUNTER
----- Message from Danny Hurtado sent at 4/1/2019  9:58 AM CDT -----  Contact: self [phone]  Patient would like to be seen sooner than the next available appointment. Please advise.

## 2019-04-01 NOTE — TELEPHONE ENCOUNTER
Spoke top patient and she states that she recently sent a message through her [facility] regarding a referral but she is wondering if she should see a kidney specialist instead of a urologist?

## 2019-04-02 ENCOUNTER — PATIENT MESSAGE (OUTPATIENT)
Dept: PRIMARY CARE CLINIC | Facility: CLINIC | Age: 34
End: 2019-04-02

## 2019-04-02 ENCOUNTER — PATIENT MESSAGE (OUTPATIENT)
Dept: OBSTETRICS AND GYNECOLOGY | Facility: CLINIC | Age: 34
End: 2019-04-02

## 2019-04-04 ENCOUNTER — PATIENT MESSAGE (OUTPATIENT)
Dept: OBSTETRICS AND GYNECOLOGY | Facility: CLINIC | Age: 34
End: 2019-04-04

## 2019-04-05 ENCOUNTER — PATIENT MESSAGE (OUTPATIENT)
Dept: PRIMARY CARE CLINIC | Facility: CLINIC | Age: 34
End: 2019-04-05

## 2019-04-09 ENCOUNTER — PATIENT MESSAGE (OUTPATIENT)
Dept: UROLOGY | Facility: CLINIC | Age: 34
End: 2019-04-09

## 2019-04-10 ENCOUNTER — PATIENT MESSAGE (OUTPATIENT)
Dept: OBSTETRICS AND GYNECOLOGY | Facility: CLINIC | Age: 34
End: 2019-04-10

## 2019-04-11 ENCOUNTER — OFFICE VISIT (OUTPATIENT)
Dept: UROLOGY | Facility: CLINIC | Age: 34
End: 2019-04-11
Attending: UROLOGY
Payer: COMMERCIAL

## 2019-04-11 VITALS
HEIGHT: 61 IN | HEART RATE: 93 BPM | DIASTOLIC BLOOD PRESSURE: 76 MMHG | WEIGHT: 107.13 LBS | SYSTOLIC BLOOD PRESSURE: 111 MMHG | BODY MASS INDEX: 20.22 KG/M2

## 2019-04-11 DIAGNOSIS — N20.0 NEPHROLITHIASIS: Primary | ICD-10-CM

## 2019-04-11 DIAGNOSIS — N32.81 OAB (OVERACTIVE BLADDER): ICD-10-CM

## 2019-04-11 LAB
BILIRUB SERPL-MCNC: ABNORMAL MG/DL
BLOOD URINE, POC: ABNORMAL
COLOR, POC UA: YELLOW
GLUCOSE UR QL STRIP: ABNORMAL
KETONES UR QL STRIP: ABNORMAL
LEUKOCYTE ESTERASE URINE, POC: ABNORMAL
NITRITE, POC UA: ABNORMAL
PH, POC UA: 5
PROTEIN, POC: ABNORMAL
SPECIFIC GRAVITY, POC UA: 1.01
UROBILINOGEN, POC UA: ABNORMAL

## 2019-04-11 PROCEDURE — 81002 POCT URINE DIPSTICK WITHOUT MICROSCOPE: ICD-10-PCS | Mod: S$GLB,,, | Performed by: UROLOGY

## 2019-04-11 PROCEDURE — 81002 URINALYSIS NONAUTO W/O SCOPE: CPT | Mod: S$GLB,,, | Performed by: UROLOGY

## 2019-04-11 PROCEDURE — 99244 PR OFFICE CONSULTATION,LEVEL IV: ICD-10-PCS | Mod: 25,S$GLB,, | Performed by: UROLOGY

## 2019-04-11 PROCEDURE — 99244 OFF/OP CNSLTJ NEW/EST MOD 40: CPT | Mod: 25,S$GLB,, | Performed by: UROLOGY

## 2019-04-11 RX ORDER — OXYBUTYNIN CHLORIDE 10 MG/1
10 TABLET, EXTENDED RELEASE ORAL DAILY
Qty: 30 TABLET | Refills: 11 | Status: SHIPPED | OUTPATIENT
Start: 2019-04-11 | End: 2019-08-22 | Stop reason: CLARIF

## 2019-04-11 NOTE — LETTER
April 11, 2019      Marisel Mcdaniels, ALVIN  [address] [address]           [facility] [address] [address]  Phone: [phone]  Fax: [phone]          Patient: Shena Gomez   MR Number: [id number]   YOB: 1985   Date of Visit: 4/11/2019       Dear Marisel Mcdaniels:    Thank you for referring Shena Gomez to me for evaluation. Attached you will find relevant portions of my assessment and plan of care.    If you have questions, please do not hesitate to call me. I look forward to following Shena Gomez along with you.    Sincerely,    Adam Sarkar MD    Enclosure  CC:  No Recipients    If you would like to receive this communication electronically, please contact [email or web address] or [phone] to request more information on [facility] access.    For providers and/or their staff who would like to refer a patient to [facility], please contact us through our one-stop-shop provider referral line, [facility], at [phone].    If you feel you have received this communication in error or would no longer like to receive these types of communications, please e-mail [email or web address]

## 2019-04-11 NOTE — PROGRESS NOTES
"Subjective:      Shena Gomez is a 33 y.o. female who was referred by Marisel Mcdaniels NP for evaluation of nephrolithiasis.      She reports that she passed a kidney stone in January.  This was her 1st episode.  That was associated with acute onset right flank pain which resolved after the stone was passed.    Since that time she has had intermittent pain in her right lower back as well as in her pelvis.  She has seen a gynecologist regarding this, was diagnosed with a large fibroid in the anterior superior portion of her uterus.  She also had a recent CT scan that demonstrated nonobstructing bilateral renal stones.    She does report some associated urinary symptoms including frequency and nocturia, 3 times per night.    The following portions of the patient's history were reviewed and updated as appropriate: allergies, current medications, past family history, past medical history, past social history, past surgical history and problem list.    Review of Systems  Constitutional: no fever or chills  ENT: no nasal congestion or sore throat  Respiratory: no cough or shortness of breath  Cardiovascular: no chest pain or palpitations  Gastrointestinal: no nausea or vomiting, tolerating diet  Genitourinary: as per HPI  Hematologic/Lymphatic: no easy bruising or lymphadenopathy  Musculoskeletal: no arthralgias or myalgias  Neurological: no seizures or tremors  Behavioral/Psych: no auditory or visual hallucinations     Objective:   Vital Signs:/76 (BP Location: Left arm, Patient Position: Sitting, BP Method: Large (Automatic))   Pulse 93   Ht 5' 1" (1.549 m)   Wt 48.6 kg (107 lb 2.3 oz)   LMP 04/03/2019 (Exact Date)   BMI 20.24 kg/m²     Physical Exam   Physical Exam   Constitutional: She is oriented to person, place, and time. She appears well-developed and well-nourished. No distress.   HENT:   Head: Normocephalic and atraumatic.   Right Ear: External ear normal.   Left Ear: External ear normal.   Nose: Nose " normal.   Mouth/Throat: Oropharynx is clear and moist.   Eyes: Conjunctivae and EOM are normal. Right eye exhibits no discharge. Left eye exhibits no discharge. No scleral icterus.   Neck: Neck supple. No tracheal deviation present.   Pulmonary/Chest: Effort normal. No respiratory distress. She exhibits no tenderness.   Abdominal: Soft. She exhibits no distension. There is no CVA tenderness. No hernia.   Musculoskeletal: She exhibits no edema.   Neurological: She is alert and oriented to person, place, and time.   Skin: Skin is warm and dry. No rash noted. No erythema.   Psychiatric: She has a normal mood and affect. Her speech is normal and behavior is normal. Judgment and thought content normal. Her mood appears not anxious. Cognition and memory are normal.      Lab Review   Urinalysis demonstrates negative for all components  Lab Results   Component Value Date    WBC 9.00 11/06/2018    HGB 15.3 11/06/2018    HCT 44.7 11/06/2018    MCV 92 11/06/2018     11/06/2018     Lab Results   Component Value Date    CREATININE 0.7 11/06/2018    BUN 15 11/06/2018       Imaging (all images personally reviewed; agree with report below)  Results for orders placed during the hospital encounter of 02/08/19   CT Renal Stone Study ABD Pelvis WO    Narrative EXAMINATION:  CT RENAL STONE STUDY ABD PELVIS WO    CLINICAL HISTORY:  Kidney stone, known, follow up;pyelocaliectasis; Unspecified hydronephrosis    TECHNIQUE:  Low dose axial images, sagittal and coronal reformations were obtained from the lung bases to the pubic synthesis without  IV administration of contrast.    COMPARISON:  Ultrasound kidneys 02/04/2019    FINDINGS:  ABDOMEN:    - Lung bases: Clear.    - Liver: No focal mass or parenchymal change.    - Gallbladder: No calcified gallstones or visible gallbladder wall changes.    - Bile Ducts: No evidence of intra or extra hepatic biliary ductal dilation.    - Spleen: Unremarkable.    - Kidneys and ureters: Bilateral  nephrolithiasis conforming to findings on previous ultrasound.  There is no hydronephrosis or localized caliectasis seen.  No ureteral stone or dilatation.  No visible renal mass.    - Adrenals: Unremarkable.    - Pancreas: No mass or peripancreatic fat stranding.    - Retroperitoneum:  No significant adenopathy.    - Vascular: No abdominal aortic aneurysm.    - Abdominal wall:  Unremarkable.    PELVIS:    No pelvic mass, adenopathy, or free fluid. Bladder and reproductive tract unremarkable.    STOMACH, BOWEL/MESENTERY:    No evidence of bowel obstruction or inflammation.  The appendix is unremarkable.    BONES:  No osseous osseous abnormality.      Impression 1. Bilateral nonobstructing nephrolithiasis.  No hydronephrosis or localized caliectasis seen, no ureterectasis.      Electronically signed by: Dilip Andrews II, MD  Date:    02/08/2019  Time:    10:41          Assessment:     1. Nephrolithiasis    2. OAB (overactive bladder)        Plan:   1. Reassured the stones are small and nonobstructing in her on likely associated with her intermittent pain.  2. Given her associated urinary symptoms, it is possible that she is experiencing overactive bladder, possibly due to compression on the bladder from her uterine fibroid.  3. We will trial Ditropan XL for urinary symptoms  4. Follow-up with gyn is scheduled regarding her fibroid  5. Follow-up with VARINDER in 6 weeks

## 2019-05-16 ENCOUNTER — PATIENT MESSAGE (OUTPATIENT)
Dept: OBSTETRICS AND GYNECOLOGY | Facility: CLINIC | Age: 34
End: 2019-05-16

## 2019-05-26 NOTE — TELEPHONE ENCOUNTER
----- Message from Valeri Carroll sent at 2/6/2019  4:20 PM CST -----  Contact: self  Patient is requesting her transvaginal u/s results to be released to her [facility] account. Patient can be reached at [phone]   Implemented All Fall Risk Interventions:  [place] to call system. Call bell, personal items and telephone within reach. Instruct patient to call for assistance. Room bathroom lighting operational. Non-slip footwear when patient is off stretcher. Physically safe environment: no spills, clutter or unnecessary equipment. Stretcher in lowest position, wheels locked, appropriate side rails in place. Provide visual cue, wrist band, yellow gown, etc. Monitor gait and stability. Monitor for mental status changes and reorient to person, place, and time. Review medications for side effects contributing to fall risk. Reinforce activity limits and safety measures with patient and family.

## 2019-05-27 ENCOUNTER — TELEPHONE (OUTPATIENT)
Dept: OBSTETRICS AND GYNECOLOGY | Facility: CLINIC | Age: 34
End: 2019-05-27

## 2019-05-27 ENCOUNTER — OFFICE VISIT (OUTPATIENT)
Dept: OBSTETRICS AND GYNECOLOGY | Facility: CLINIC | Age: 34
End: 2019-05-27
Attending: OBSTETRICS & GYNECOLOGY
Payer: COMMERCIAL

## 2019-05-27 VITALS
BODY MASS INDEX: 21.31 KG/M2 | WEIGHT: 108.56 LBS | HEIGHT: 60 IN | SYSTOLIC BLOOD PRESSURE: 100 MMHG | DIASTOLIC BLOOD PRESSURE: 64 MMHG

## 2019-05-27 DIAGNOSIS — N94.6 DYSMENORRHEA: Primary | ICD-10-CM

## 2019-05-27 DIAGNOSIS — N92.0 MENORRHAGIA WITH REGULAR CYCLE: ICD-10-CM

## 2019-05-27 DIAGNOSIS — D21.9 FIBROIDS: ICD-10-CM

## 2019-05-27 PROCEDURE — 3008F PR BODY MASS INDEX (BMI) DOCUMENTED: ICD-10-PCS | Mod: CPTII,S$GLB,, | Performed by: OBSTETRICS & GYNECOLOGY

## 2019-05-27 PROCEDURE — 3008F BODY MASS INDEX DOCD: CPT | Mod: CPTII,S$GLB,, | Performed by: OBSTETRICS & GYNECOLOGY

## 2019-05-27 PROCEDURE — 99214 PR OFFICE/OUTPT VISIT, EST, LEVL IV, 30-39 MIN: ICD-10-PCS | Mod: S$GLB,,, | Performed by: OBSTETRICS & GYNECOLOGY

## 2019-05-27 PROCEDURE — 99214 OFFICE O/P EST MOD 30 MIN: CPT | Mod: S$GLB,,, | Performed by: OBSTETRICS & GYNECOLOGY

## 2019-05-27 NOTE — TELEPHONE ENCOUNTER
----- Message from Mariella Valdes MA sent at 5/27/2019 10:05 AM CDT -----  Dr. Sood would like to refer this pt to Dr John for a consult. Pt has fibroids and may want to get a myomectomy.  Thanks Mariella

## 2019-05-27 NOTE — TELEPHONE ENCOUNTER
Contacted the patient to offer her an appointment for a fibroids consult. Patient agreed to an appointment on 7/8 at 9:45AM. Patient verbalized understanding.

## 2019-05-27 NOTE — PROGRESS NOTES
"SUBJECTIVE:   33 y.o. female  presents today to discuss medication and her fibroids. Patient's last menstrual period was 2019..  She reports that she cannot remember to take the medication - she would like to try taking it daily. She reports that her cycles are heavy for the first 3 days- she reports that she soaks through a pad and tampon in an hour. She also reports pain with her cycle on the first 2 days- "shoots down my let and nothing helps with the pain." She also reports that she is having "trouble with my bladder." She goes to the bathroom frequently and goes at night 3 times.     She has been evaluated by Urology    History reviewed. No pertinent past medical history.  Past Surgical History:   Procedure Laterality Date    CERCLAGE, CERVIX N/A 2013    Performed by [facility]als[facility] at [facility] L[facility]     SECTION       SECTION N/A 2014    Performed by Barrera Martínez Jr., MD at [facility] L[facility]D     Social History     Socioeconomic History    Marital status:      Spouse name: Not on file    Number of children: Not on file    Years of education: Not on file    Highest education level: Not on file   Occupational History    Not on file   Social Needs    Financial resource strain: Not on file    Food insecurity:     Worry: Not on file     Inability: Not on file    Transportation needs:     Medical: Not on file     Non-medical: Not on file   Tobacco Use    Smoking status: Never Smoker    Smokeless tobacco: Never Used   Substance and Sexual Activity    Alcohol use: No    Drug use: No    Sexual activity: Yes     Partners: Male     Birth control/protection: None   Lifestyle    Physical activity:     Days per week: Not on file     Minutes per session: Not on file    Stress: Not on file   Relationships    Social connections:     Talks on phone: Not on file     Gets together: Not on file     Attends Zoroastrian service: Not on file     Active member of club or " organization: Not on file     Attends meetings of clubs or organizations: Not on file     Relationship status: Not on file   Other Topics Concern    Not on file   Social History Narrative    Not on file     Family History   Problem Relation Age of Onset    Breast cancer Maternal Grandmother 68    Breast cancer Maternal Aunt 56    Cancer Neg Hx     Colon cancer Neg Hx     Diabetes Neg Hx     Eclampsia Neg Hx     Miscarriages / Stillbirths Neg Hx     Hypertension Neg Hx     Ovarian cancer Neg Hx      labor Neg Hx     Stroke Neg Hx      OB History    Para Term  AB Living   5 4 3 1 1 3   SAB TAB Ectopic Multiple Live Births   1       1      # Outcome Date GA Lbr Reginald/2nd Weight Sex Delivery Anes PTL Lv   5 Term 14 39w1d  3.195 kg (7 lb 0.7 oz) F CS-LTranv Spinal N GUI   4  2012 22w0d  0.709 kg (1 lb 9 oz) M       3 Term 09/10/08 38w0d  3.629 kg (8 lb) M CS-LTranv EPI     2 Term 05 42w0d  3.345 kg (7 lb 6 oz) M CS-LTranv EPI     1 SAB                    Current Outpatient Medications   Medication Sig Dispense Refill    FLUoxetine 10 MG capsule Take daily for 14 days each month- 2 weeks prior to your period 28 capsule 6    oxybutynin (DITROPAN-XL) 10 MG 24 hr tablet Take 1 tablet (10 mg total) by mouth once daily. 30 tablet 11    tamsulosin (FLOMAX) 0.4 mg Cap Take 1 capsule (0.4 mg total) by mouth once daily. 30 capsule 11     No current facility-administered medications for this visit.      Allergies: Celestone [betamethasone]     The ASCVD Risk score ([place]ac[place]es BENNY Jr., et al., 2013) failed to calculate for the following reasons:    The 2013 ASCVD risk score is only valid for ages 40 to 79      ROS:  Constitutional: no weight loss, weight gain, fever, fatigue  Eyes:  No vision changes, glasses/contacts  ENT/Mouth: No ulcers, sinus problems, ears ringing, headache  Cardiovascular: No inability to lie flat, chest pain, exercise intolerance, swelling, heart  palpitations  Respiratory: No wheezing, coughing blood, shortness of breath, or cough  Gastrointestinal: No diarrhea, bloody stool, nausea/vomiting, constipation, gas, hemorrhoids  Genitourinary: No blood in urine, painful urination, urgency of urination, frequency of urination, incomplete emptying, incontinence, +abnormal bleeding,+ painful periods,+ heavy periods, vaginal discharge, vaginal odor, painful intercourse, sexual problems, bleeding after intercourse.  Musculoskeletal: No muscle weakness  Skin/Breast: No painful breasts, nipple discharge, masses, rash, ulcers  Neurological: No passing out, seizures, numbness, headache  Endocrine: No diabetes, hypothyroid, hyperthyroid, hot flashes, hair loss, abnormal hair growth, acne  Psychiatric: No depression, crying, +mood swings  Hematologic: No bruises, bleeding, swollen lymph nodes, anemia.      Physical Exam  General- well developed, well nourished  Vulva- no masses, no lesions  Vagina-  no masses, no lesions  Cervix- no Cervical motion tenderness, no lesions  Uterus- normal size, nontender, +fibroid uterus  Adnexa- nontender, no masses      ASSESSMENT:   Dysmenorrhea  Fibroids  Heavy menses      PLAN: Face to face time with patient 25 mintues- the majority spent in counseling, explaining her ultrasound findings, and arranging follow up  Consult with Dr. John to discuss myomectomy vs hysterectomy- patient has some concerns about a hysterectomy. She had a BTL because she was afraid to have more children and now has regret. She is concerned about having regret over a hysterectomy.   Will change Fluoxetine to daily

## 2019-06-12 ENCOUNTER — PATIENT MESSAGE (OUTPATIENT)
Dept: OBSTETRICS AND GYNECOLOGY | Facility: CLINIC | Age: 34
End: 2019-06-12

## 2019-06-12 ENCOUNTER — PATIENT MESSAGE (OUTPATIENT)
Dept: UROLOGY | Facility: CLINIC | Age: 34
End: 2019-06-12

## 2019-06-12 ENCOUNTER — PATIENT MESSAGE (OUTPATIENT)
Dept: PRIMARY CARE CLINIC | Facility: CLINIC | Age: 34
End: 2019-06-12

## 2019-06-21 ENCOUNTER — PATIENT MESSAGE (OUTPATIENT)
Dept: PRIMARY CARE CLINIC | Facility: CLINIC | Age: 34
End: 2019-06-21

## 2019-06-24 ENCOUNTER — PATIENT MESSAGE (OUTPATIENT)
Dept: OBSTETRICS AND GYNECOLOGY | Facility: CLINIC | Age: 34
End: 2019-06-24

## 2019-06-24 ENCOUNTER — TELEPHONE (OUTPATIENT)
Dept: PRIMARY CARE CLINIC | Facility: CLINIC | Age: 34
End: 2019-06-24

## 2019-06-24 NOTE — TELEPHONE ENCOUNTER
----- Message from Christie Mata sent at 6/24/2019  9:21 AM CDT -----  Type: Needs Medical Advice    Who Called:  Patient   Best Call Back Number: [phone]  Additional Information: patient is requesting a call back to follow up on messages thru my chart.

## 2019-06-24 NOTE — TELEPHONE ENCOUNTER
Spoke with patient states she thinks its very unprofessional for a radiologist to take this long to re-read a CT scan. States she knows it not our fault but she needs to start figuring things out. Advised her Marisel sent an email to the radiology department, advised her to contact patient relations. Gave patient the number to patient relations. Nothing further discussed.

## 2019-06-24 NOTE — TELEPHONE ENCOUNTER
Please let the patient know that I am awaiting feedback from radiology regarding re-reading of the test. Dr. Andrews is reviewing the complaint and will determine the appropriateness. I will let her know when I hear something.  With complaints such as these, I encourage her to go to patient relations for further resolution.

## 2019-07-01 ENCOUNTER — PATIENT MESSAGE (OUTPATIENT)
Dept: PRIMARY CARE CLINIC | Facility: CLINIC | Age: 34
End: 2019-07-01

## 2019-07-02 ENCOUNTER — PATIENT MESSAGE (OUTPATIENT)
Dept: PRIMARY CARE CLINIC | Facility: CLINIC | Age: 34
End: 2019-07-02

## 2019-07-02 NOTE — TELEPHONE ENCOUNTER
Instructed patient to start Zantac for 1-2 weeks 300 mg daily either 150 mg twice daily or 300 mg q.h.s..  If no improvement will start PPI.  Reluctant to start PPIs because patient is requesting H pylori testing.  If no improvement after starting PPIs will refer to Gastroenterology at [facility].  Patient denies any hematemesis or black tarry stools.  Currently taking ibuprofen as needed for headaches-states she will stop at this time.  Minimize caffeine.  Minimize red gravy and chocolate.

## 2019-07-03 ENCOUNTER — PATIENT OUTREACH (OUTPATIENT)
Dept: ADMINISTRATIVE | Facility: OTHER | Age: 34
End: 2019-07-03

## 2019-07-03 ENCOUNTER — PATIENT MESSAGE (OUTPATIENT)
Dept: OBSTETRICS AND GYNECOLOGY | Facility: CLINIC | Age: 34
End: 2019-07-03

## 2019-07-08 ENCOUNTER — OFFICE VISIT (OUTPATIENT)
Dept: OBSTETRICS AND GYNECOLOGY | Facility: CLINIC | Age: 34
End: 2019-07-08
Attending: OBSTETRICS & GYNECOLOGY
Payer: COMMERCIAL

## 2019-07-08 VITALS
HEIGHT: 60 IN | BODY MASS INDEX: 21.34 KG/M2 | SYSTOLIC BLOOD PRESSURE: 114 MMHG | WEIGHT: 108.69 LBS | DIASTOLIC BLOOD PRESSURE: 76 MMHG

## 2019-07-08 DIAGNOSIS — D25.1 FIBROIDS, INTRAMURAL: Primary | ICD-10-CM

## 2019-07-08 DIAGNOSIS — R53.82 CHRONIC FATIGUE: ICD-10-CM

## 2019-07-08 PROCEDURE — 99214 OFFICE O/P EST MOD 30 MIN: CPT | Mod: S$GLB,,, | Performed by: OBSTETRICS & GYNECOLOGY

## 2019-07-08 PROCEDURE — 99214 PR OFFICE/OUTPT VISIT, EST, LEVL IV, 30-39 MIN: ICD-10-PCS | Mod: S$GLB,,, | Performed by: OBSTETRICS & GYNECOLOGY

## 2019-07-08 PROCEDURE — 99999 PR PBB SHADOW E&M-EST. PATIENT-LVL IV: CPT | Mod: PBBFAC,,, | Performed by: OBSTETRICS & GYNECOLOGY

## 2019-07-08 PROCEDURE — 3008F PR BODY MASS INDEX (BMI) DOCUMENTED: ICD-10-PCS | Mod: CPTII,S$GLB,, | Performed by: OBSTETRICS & GYNECOLOGY

## 2019-07-08 PROCEDURE — 3008F BODY MASS INDEX DOCD: CPT | Mod: CPTII,S$GLB,, | Performed by: OBSTETRICS & GYNECOLOGY

## 2019-07-08 PROCEDURE — 99999 PR PBB SHADOW E&M-EST. PATIENT-LVL IV: ICD-10-PCS | Mod: PBBFAC,,, | Performed by: OBSTETRICS & GYNECOLOGY

## 2019-07-09 ENCOUNTER — PATIENT MESSAGE (OUTPATIENT)
Dept: OBSTETRICS AND GYNECOLOGY | Facility: CLINIC | Age: 34
End: 2019-07-09

## 2019-07-09 RX ORDER — SODIUM CHLORIDE 9 MG/ML
INJECTION, SOLUTION INTRAVENOUS CONTINUOUS
Status: CANCELLED | OUTPATIENT
Start: 2019-07-09

## 2019-07-12 ENCOUNTER — PATIENT MESSAGE (OUTPATIENT)
Dept: PRIMARY CARE CLINIC | Facility: CLINIC | Age: 34
End: 2019-07-12

## 2019-07-15 ENCOUNTER — PATIENT MESSAGE (OUTPATIENT)
Dept: OBSTETRICS AND GYNECOLOGY | Facility: CLINIC | Age: 34
End: 2019-07-15

## 2019-07-17 ENCOUNTER — PATIENT MESSAGE (OUTPATIENT)
Dept: OBSTETRICS AND GYNECOLOGY | Facility: CLINIC | Age: 34
End: 2019-07-17

## 2019-08-09 ENCOUNTER — PATIENT MESSAGE (OUTPATIENT)
Dept: OBSTETRICS AND GYNECOLOGY | Facility: CLINIC | Age: 34
End: 2019-08-09

## 2019-08-20 ENCOUNTER — PATIENT OUTREACH (OUTPATIENT)
Dept: ADMINISTRATIVE | Facility: OTHER | Age: 34
End: 2019-08-20

## 2019-08-22 ENCOUNTER — OFFICE VISIT (OUTPATIENT)
Dept: OBSTETRICS AND GYNECOLOGY | Facility: CLINIC | Age: 34
End: 2019-08-22
Attending: OBSTETRICS & GYNECOLOGY
Payer: COMMERCIAL

## 2019-08-22 ENCOUNTER — ANESTHESIA EVENT (OUTPATIENT)
Dept: SURGERY | Facility: OTHER | Age: 34
End: 2019-08-22
Payer: COMMERCIAL

## 2019-08-22 ENCOUNTER — HOSPITAL ENCOUNTER (OUTPATIENT)
Dept: PREADMISSION TESTING | Facility: OTHER | Age: 34
Discharge: HOME | End: 2019-08-22
Attending: OBSTETRICS & GYNECOLOGY
Payer: COMMERCIAL

## 2019-08-22 VITALS
SYSTOLIC BLOOD PRESSURE: 106 MMHG | BODY MASS INDEX: 20.82 KG/M2 | HEIGHT: 60 IN | DIASTOLIC BLOOD PRESSURE: 78 MMHG | WEIGHT: 106.06 LBS

## 2019-08-22 VITALS
OXYGEN SATURATION: 99 % | BODY MASS INDEX: 20.81 KG/M2 | HEART RATE: 86 BPM | HEIGHT: 60 IN | DIASTOLIC BLOOD PRESSURE: 58 MMHG | SYSTOLIC BLOOD PRESSURE: 106 MMHG | TEMPERATURE: 98 F | WEIGHT: 106 LBS

## 2019-08-22 DIAGNOSIS — Z01.818 PRE-OP TESTING: Primary | ICD-10-CM

## 2019-08-22 DIAGNOSIS — Z01.818 PREOPERATIVE EXAM FOR GYNECOLOGIC SURGERY: Primary | ICD-10-CM

## 2019-08-22 LAB
ABO + RH BLD: NORMAL
BLD GP AB SCN CELLS X3 SERPL QL: NORMAL

## 2019-08-22 PROCEDURE — 99499 NO LOS: ICD-10-PCS | Mod: S$GLB,,, | Performed by: OBSTETRICS & GYNECOLOGY

## 2019-08-22 PROCEDURE — 86850 RBC ANTIBODY SCREEN: CPT

## 2019-08-22 PROCEDURE — 99999 PR PBB SHADOW E&M-EST. PATIENT-LVL III: CPT | Mod: PBBFAC,,, | Performed by: OBSTETRICS & GYNECOLOGY

## 2019-08-22 PROCEDURE — 99999 PR PBB SHADOW E&M-EST. PATIENT-LVL III: ICD-10-PCS | Mod: PBBFAC,,, | Performed by: OBSTETRICS & GYNECOLOGY

## 2019-08-22 PROCEDURE — 99499 UNLISTED E&M SERVICE: CPT | Mod: S$GLB,,, | Performed by: OBSTETRICS & GYNECOLOGY

## 2019-08-22 PROCEDURE — 36415 COLL VENOUS BLD VENIPUNCTURE: CPT

## 2019-08-22 RX ORDER — PREGABALIN 75 MG/1
75 CAPSULE ORAL
Status: CANCELLED | OUTPATIENT
Start: 2019-08-22 | End: 2019-08-22

## 2019-08-22 RX ORDER — SODIUM CHLORIDE, SODIUM LACTATE, POTASSIUM CHLORIDE, CALCIUM CHLORIDE 600; 310; 30; 20 MG/100ML; MG/100ML; MG/100ML; MG/100ML
INJECTION, SOLUTION INTRAVENOUS CONTINUOUS
Status: CANCELLED | OUTPATIENT
Start: 2019-08-22

## 2019-08-22 RX ORDER — FAMOTIDINE 20 MG/1
20 TABLET, FILM COATED ORAL
Status: CANCELLED | OUTPATIENT
Start: 2019-08-22 | End: 2019-08-22

## 2019-08-22 RX ORDER — ACETAMINOPHEN 500 MG
1000 TABLET ORAL
Status: CANCELLED | OUTPATIENT
Start: 2019-08-22 | End: 2019-08-22

## 2019-08-22 RX ORDER — SCOLOPAMINE TRANSDERMAL SYSTEM 1 MG/1
1 PATCH, EXTENDED RELEASE TRANSDERMAL ONCE
Status: CANCELLED | OUTPATIENT
Start: 2019-08-22 | End: 2019-08-22

## 2019-08-22 NOTE — PROGRESS NOTES
CC: Preop exam    Shena Gomez is a 34 y.o. female  presents for a pre-op exam for a TLH, BS, cystoscopy scheduled on .    She began having a lot of pelvic pain starting in January.  Ultrasound was done at that time and fibroids were found.  She reports severe dysmenorrhea.  She has a history of gastric ulcer and cannot take NSAIDS.  She reports cycles are regular.  She uses tampons and pads.  She does have accidents on the first 2 days.  She also has clots.  She has tried oral contraception but does not want to take a medication everyday.       She also reports bladder pain and has been seen and evaluation by urology.  She was found to have a kidney stone and has some residual pain from that but still has bladder discomfort and urinary frequency.       Ultrasound:      FINDINGS:  Uterus:    Size: 8.4 x 5 x 6.4 cm    Masses: Multiple fibroids the largest posterior fundal 4 x 3.6 x 4 cm shows sub endometrial extension as well as subserosal protrusion.    Endometrium: Homogeneous, measuring 12 mm.    Right ovary:    Size: 2.2 x 1.5 x 2.1 cm    Appearance: Normal follicles.    Vascular flow: Normal.    Left ovary:    Size: 2.8 x 1.7 x 2.6 cm    Appearance: Normally distributed follicles.    Vascular Flow: Normal.    Free Fluid:    None.      Impression       1. Multiple uterine fibroids and uterine cysts.  2. No ovarian abnormality seen.             History reviewed. No pertinent past medical history.    Past Surgical History:   Procedure Laterality Date    CERCLAGE, CERVIX N/A 2013    Performed by Bossman Kowalski III, MD at [facility]     SECTION       SECTION N/A 2014    Performed by Barrera Martínez Jr., MD at [facility]    TUBAL LIGATION  14       OB History    Para Term  AB Living   5 4 3 1 1 3   SAB TAB Ectopic Multiple Live Births   1       1      # Outcome Date GA Lbr Reginald/2nd Weight Sex Delivery Anes PTL Lv   5 Term 14 39w1d   3.195 kg (7 lb 0.7 oz) F CS-LTranv Spinal N GUI   4  2012 22w0d  0.709 kg (1 lb 9 oz) M       3 Term 09/10/08 38w0d  3.629 kg (8 lb) M CS-LTranv EPI     2 Term 05 42w0d  3.345 kg (7 lb 6 oz) M CS-LTranv EPI     1 SAB                Family History   Problem Relation Age of Onset    Breast cancer Maternal Grandmother 68    Breast cancer Maternal Aunt 56    Esophageal cancer Maternal Aunt     Cancer Neg Hx     Colon cancer Neg Hx     Diabetes Neg Hx     Eclampsia Neg Hx     Miscarriages / Stillbirths Neg Hx     Hypertension Neg Hx     Ovarian cancer Neg Hx      labor Neg Hx     Stroke Neg Hx        Social History     Tobacco Use    Smoking status: Never Smoker    Smokeless tobacco: Never Used   Substance Use Topics    Alcohol use: Never     Alcohol/week: 0.0 oz    Drug use: Never       /78 (BP Location: Left arm, Patient Position: Sitting, BP Method: Small (Manual))   Ht 5' (1.524 m)   Wt 48.1 kg (106 lb 0.7 oz)   LMP 2019 (Exact Date)   BMI 20.71 kg/m²     ROS:  GENERAL: Denies weight gain or weight loss. Feeling well overall.   SKIN: Denies rash or lesions.   HEAD: Denies head injury or headache.   NODES: Denies enlarged lymph nodes.   CHEST: Denies chest pain or shortness of breath.   CARDIOVASCULAR: Denies palpitations or left sided chest pain.   ABDOMEN: No abdominal pain, constipation, diarrhea, nausea, vomiting or rectal bleeding.   URINARY: No frequency, dysuria, hematuria, or burning on urination.  REPRODUCTIVE: See HPI.   HEMATOLOGIC: No easy bruisability or excessive bleeding with the exception of menstrual cycles.  MUSCULOSKELETAL: Denies joint pain or swelling.   NEUROLOGIC: Denies syncope or weakness.   PSYCHIATRIC: Denies depression, anxiety or mood swings.    PHYSICAL EXAM:  APPEARANCE: Well nourished, well developed, in no acute distress.  AFFECT: WNL, alert and oriented x 3  SKIN: No acne or hirsutism  NECK: Neck symmetric without masses or  thyromegaly  NODES: No inguinal, cervical, axillary, or femoral lymph node enlargement  CHEST: Good respiratory effect  ABDOMEN: Soft.  No tenderness or masses.  No hepatosplenomegaly.  No hernias.  PELVIC: Deferred  EXTREMITIES: No edema.      ICD-10-CM ICD-9-CM    1. Preoperative exam for gynecologic surgery Z01.818 V72.83      Discussed surgical risks including but not limited to the risk of laparotomy, risk of cystotomy given  history.        I have discussed the risks, benefits, indications, and alternatives of the procedure in detail.  The patient verbalizes her understanding.  All questions answered.  Consents signed.  The patient agrees to proceed to proceed as planned.

## 2019-08-22 NOTE — H&P (VIEW-ONLY)
CC: Preop exam    Shena Gomez is a 34 y.o. female  presents for a pre-op exam for a TLH, BS, cystoscopy scheduled on .    She began having a lot of pelvic pain starting in January.  Ultrasound was done at that time and fibroids were found.  She reports severe dysmenorrhea.  She has a history of gastric ulcer and cannot take NSAIDS.  She reports cycles are regular.  She uses tampons and pads.  She does have accidents on the first 2 days.  She also has clots.  She has tried oral contraception but does not want to take a medication everyday.       She also reports bladder pain and has been seen and evaluation by urology.  She was found to have a kidney stone and has some residual pain from that but still has bladder discomfort and urinary frequency.       Ultrasound:      FINDINGS:  Uterus:    Size: 8.4 x 5 x 6.4 cm    Masses: Multiple fibroids the largest posterior fundal 4 x 3.6 x 4 cm shows sub endometrial extension as well as subserosal protrusion.    Endometrium: Homogeneous, measuring 12 mm.    Right ovary:    Size: 2.2 x 1.5 x 2.1 cm    Appearance: Normal follicles.    Vascular flow: Normal.    Left ovary:    Size: 2.8 x 1.7 x 2.6 cm    Appearance: Normally distributed follicles.    Vascular Flow: Normal.    Free Fluid:    None.      Impression       1. Multiple uterine fibroids and uterine cysts.  2. No ovarian abnormality seen.             History reviewed. No pertinent past medical history.    Past Surgical History:   Procedure Laterality Date    CERCLAGE, CERVIX N/A 2013    Performed by Bossman Kowalski III, [facility] at [facility]     SECTION       SECTION N/A 2014    Performed by Barrera Martínez Jr., [facility] at [facility]    TUBAL LIGATION  14       OB History    Para Term  AB Living   5 4 3 1 1 3   SAB TAB Ectopic Multiple Live Births   1       1      # Outcome Date GA Lbr Reginald/2nd Weight Sex Delivery Anes PTL Lv   5 Term 14 39w1d   3.195 kg (7 lb 0.7 oz) F CS-LTranv Spinal N GUI   4  2012 22w0d  0.709 kg (1 lb 9 oz) M       3 Term 09/10/08 38w0d  3.629 kg (8 lb) M CS-LTranv EPI     2 Term 05 42w0d  3.345 kg (7 lb 6 oz) M CS-LTranv EPI     1 SAB                Family History   Problem Relation Age of Onset    Breast cancer Maternal Grandmother 68    Breast cancer Maternal Aunt 56    Esophageal cancer Maternal Aunt     Cancer Neg Hx     Colon cancer Neg Hx     Diabetes Neg Hx     Eclampsia Neg Hx     Miscarriages / Stillbirths Neg Hx     Hypertension Neg Hx     Ovarian cancer Neg Hx      labor Neg Hx     Stroke Neg Hx        Social History     Tobacco Use    Smoking status: Never Smoker    Smokeless tobacco: Never Used   Substance Use Topics    Alcohol use: Never     Alcohol/week: 0.0 oz    Drug use: Never       /78 (BP Location: Left arm, Patient Position: Sitting, BP Method: Small (Manual))   Ht 5' (1.524 m)   Wt 48.1 kg (106 lb 0.7 oz)   LMP 2019 (Exact Date)   BMI 20.71 kg/m²     ROS:  GENERAL: Denies weight gain or weight loss. Feeling well overall.   SKIN: Denies rash or lesions.   HEAD: Denies head injury or headache.   NODES: Denies enlarged lymph nodes.   CHEST: Denies chest pain or shortness of breath.   CARDIOVASCULAR: Denies palpitations or left sided chest pain.   ABDOMEN: No abdominal pain, constipation, diarrhea, nausea, vomiting or rectal bleeding.   URINARY: No frequency, dysuria, hematuria, or burning on urination.  REPRODUCTIVE: See HPI.   HEMATOLOGIC: No easy bruisability or excessive bleeding with the exception of menstrual cycles.  MUSCULOSKELETAL: Denies joint pain or swelling.   NEUROLOGIC: Denies syncope or weakness.   PSYCHIATRIC: Denies depression, anxiety or mood swings.    PHYSICAL EXAM:  APPEARANCE: Well nourished, well developed, in no acute distress.  AFFECT: WNL, alert and oriented x 3  SKIN: No acne or hirsutism  NECK: Neck symmetric without masses or  thyromegaly  NODES: No inguinal, cervical, axillary, or femoral lymph node enlargement  CHEST: Good respiratory effect  ABDOMEN: Soft.  No tenderness or masses.  No hepatosplenomegaly.  No hernias.  PELVIC: Deferred  EXTREMITIES: No edema.      ICD-10-CM ICD-9-CM    1. Preoperative exam for gynecologic surgery Z01.818 V72.83      Discussed surgical risks including but not limited to the risk of laparotomy, risk of cystotomy given  history.        I have discussed the risks, benefits, indications, and alternatives of the procedure in detail.  The patient verbalizes her understanding.  All questions answered.  Consents signed.  The patient agrees to proceed to proceed as planned.

## 2019-08-22 NOTE — DISCHARGE INSTRUCTIONS
PRE-ADMIT TESTING -  [phone]    [address]  [place] [facility]          Your surgery has been scheduled at [facility]. We are pleased to have the opportunity to serve you. For Further Information please call [phone].    On the day of surgery please report to the Information Desk on the 1st floor.    · CONTACT YOUR PHYSICIAN'S OFFICE THE DAY PRIOR TO YOUR SURGERY TO OBTAIN YOUR ARRIVAL TIME.     · The evening before surgery do not eat anything after 9 p.m. ( this includes hard candy, chewing gum and mints).  You may only have GATORADE, POWERADE AND WATER  from 9 p.m. until you leave your home.   DO NOT DRINK ANY LIQUIDS ON THE WAY TO THE HOSPITAL.      SPECIAL MEDICATION INSTRUCTIONS: TAKE medications checked off by the Anesthesiologist on your Medication List.    Angiogram Patients: Take medications as instructed by your physician, including aspirin.     Surgery Patients:    If you take ASPIRIN - Your PHYSICIAN/SURGEON will need to inform you IF/OR when you need to stop taking aspirin prior to your surgery.     Do Not take any medications containing IBUPROFEN.  Do Not Wear any make-up or dark nail polish   (especially eye make-up) to surgery. If you come to surgery with makeup on you will be required to remove the makeup or nail polish.    Do not shave your surgical area at least 5 days prior to your surgery. The surgical prep will be performed at the hospital according to Infection Control regulations.    Leave all valuables at home.   Do Not wear any jewelry or watches, including any metal in body piercings. Jewelry must be removed prior to coming to the hospital.  There is a possibility that rings that are unable to be removed may be cut off if they are on the surgical extremity.    Contact Lens must be removed before surgery. Either do not wear the contact lens or bring a case and solution for storage.  Please bring a container for eyeglasses or dentures as required.  Bring  any paperwork your physician has provided, such as consent forms,  history and physicals, doctor's orders, etc.   Bring comfortable clothes that are loose fitting to wear upon discharge. Take into consideration the type of surgery being performed.  Maintain your diet as advised per your physician the day prior to surgery.      Adequate rest the night before surgery is advised.   Park in the Parking lot behind the hospital or in the [facility] across the street from the parking lot. Parking is complimentary.  If you will be discharged the same day as your procedure, please arrange for a responsible adult to drive you home or to accompany you if traveling by taxi.   YOU WILL NOT BE PERMITTED TO DRIVE OR TO LEAVE THE HOSPITAL ALONE AFTER SURGERY.   It is strongly recommended that you arrange for someone to remain with you for the first 24 hrs following your surgery.    The Surgeon will speak to your family/visitor after your surgery regarding the outcome of your surgery and post op care.  The Surgeon may speak to you after your surgery, but there is a possibility you may not remember the details.  Please check with your family members regarding the conversation with the Surgeon.    We strongly recommend whoever is bringing you home be present for discharge instructions.  This will ensure a thorough understanding for your post op home care.      Thank you for your cooperation.  The Staff of [facility].                Bathing Instructions with Hibiclens     Shower the evening before and morning of your procedure with Hibiclens:   Wash your face with water and your regular face wash/soap   Apply Hibiclens directly on your skin or on a wet washcloth and wash gently. When showering: Move away from the shower stream when applying Hibiclens to avoid rinsing off too soon.   Rinse thoroughly with warm water   Do not dilute Hibiclens         Dry off as usual, do not use any deodorant,  powder, body lotions, perfume, after shave or cologne.

## 2019-08-22 NOTE — ANESTHESIA PREPROCEDURE EVALUATION
08/22/2019  Shena Gomez is a 34 y.o., female.    Anesthesia Evaluation    I have reviewed the Patient Summary Reports.    I have reviewed the Nursing Notes.   I have reviewed the Medications.     Review of Systems  Anesthesia Hx:  No problems with previous Anesthesia  Denies Family Hx of Anesthesia complications.   Denies Personal Hx of Anesthesia complications.   Social:  Non-Smoker    Hematology/Oncology:  Hematology Normal   Oncology Normal     EENT/Dental:EENT/Dental Normal   Cardiovascular:  Cardiovascular Normal     Pulmonary:  Pulmonary Normal    Renal/:   renal calculi    Hepatic/GI:  Hepatic/GI Normal    Musculoskeletal:  Musculoskeletal Normal    Neurological:  Neurology Normal    Endocrine:  Endocrine Normal        Physical Exam  General:  Well nourished    Airway/Jaw/Neck:  Airway Findings: Mouth Opening: Normal Tongue: Normal  General Airway Assessment: Adult  Mallampati: I  TM Distance: Normal, at least 6 cm         Dental:  Dental Findings: In tact, Upper braces, Lower braces             Anesthesia Plan  Type of Anesthesia, risks & benefits discussed:  Anesthesia Type:  general  Patient's Preference:   Intra-op Monitoring Plan: standard ASA monitors  Intra-op Monitoring Plan Comments:   Post Op Pain Control Plan: multimodal analgesia and per primary service following discharge from PACU  Post Op Pain Control Plan Comments:   Induction:   IV  Beta Blocker:         Informed Consent: Patient understands risks and agrees with Anesthesia plan.  Questions answered. Anesthesia consent signed with patient.  ASA Score: 1     Day of Surgery Review of History & Physical:    H&P update referred to the surgeon.         Ready For Surgery From Anesthesia Perspective.

## 2019-08-23 ENCOUNTER — PATIENT MESSAGE (OUTPATIENT)
Dept: SURGERY | Facility: OTHER | Age: 34
End: 2019-08-23

## 2019-08-25 ENCOUNTER — NURSE TRIAGE (OUTPATIENT)
Dept: ADMINISTRATIVE | Facility: CLINIC | Age: 34
End: 2019-08-25

## 2019-08-25 ENCOUNTER — PATIENT MESSAGE (OUTPATIENT)
Dept: SURGERY | Facility: OTHER | Age: 34
End: 2019-08-25

## 2019-08-25 NOTE — TELEPHONE ENCOUNTER
Reason for Disposition   [1] Caller requesting NON-URGENT health information AND [2] PCP's office is the best resource    Additional Information   Negative: RN needs further essential information from caller in order to complete triage   Negative: Requesting regular office appointment    Protocols used: INFORMATION ONLY CALL-A-    Pt states she started her menstrual cycle, pt is scheduled for a hysterectomy Tuesday, and pt is asking if she can still have surgery. Pt advised per protocol and pt verbalizes understanding.

## 2019-08-26 ENCOUNTER — TELEPHONE (OUTPATIENT)
Dept: OBSTETRICS AND GYNECOLOGY | Facility: CLINIC | Age: 34
End: 2019-08-26

## 2019-08-26 ENCOUNTER — PATIENT MESSAGE (OUTPATIENT)
Dept: SURGERY | Facility: OTHER | Age: 34
End: 2019-08-26

## 2019-08-26 NOTE — TELEPHONE ENCOUNTER
Returned call to the patient. Patient informed that she can still have the Sx if her cycle starts. Patient inquired if Pily SUTTON Will be responding to her message regarding the financial assistance. Informed the patient that a message will be forwarded to Pily regarding this. Patient verbalized understanding.

## 2019-08-27 ENCOUNTER — ANESTHESIA (OUTPATIENT)
Dept: SURGERY | Facility: OTHER | Age: 34
End: 2019-08-27
Payer: COMMERCIAL

## 2019-08-27 ENCOUNTER — HOSPITAL ENCOUNTER (OUTPATIENT)
Facility: OTHER | Age: 34
Discharge: HOME | End: 2019-08-27
Attending: OBSTETRICS & GYNECOLOGY | Admitting: OBSTETRICS & GYNECOLOGY
Payer: COMMERCIAL

## 2019-08-27 VITALS
TEMPERATURE: 98 F | BODY MASS INDEX: 20.81 KG/M2 | HEART RATE: 74 BPM | DIASTOLIC BLOOD PRESSURE: 64 MMHG | WEIGHT: 106 LBS | HEIGHT: 60 IN | OXYGEN SATURATION: 99 % | RESPIRATION RATE: 18 BRPM | SYSTOLIC BLOOD PRESSURE: 102 MMHG

## 2019-08-27 DIAGNOSIS — D25.1 FIBROIDS, INTRAMURAL: ICD-10-CM

## 2019-08-27 DIAGNOSIS — Z90.710 S/P LAPAROSCOPIC HYSTERECTOMY: ICD-10-CM

## 2019-08-27 DIAGNOSIS — Z98.890 S/P ENDOMETRIAL ABLATION: Primary | ICD-10-CM

## 2019-08-27 LAB
B-HCG UR QL: NEGATIVE
CTP QC/QA: YES
POCT GLUCOSE: 76 MG/DL (ref 70–110)

## 2019-08-27 PROCEDURE — 71000033 HC RECOVERY, INTIAL HOUR: Performed by: OBSTETRICS & GYNECOLOGY

## 2019-08-27 PROCEDURE — 71000015 HC POSTOP RECOV 1ST HR: Performed by: OBSTETRICS & GYNECOLOGY

## 2019-08-27 PROCEDURE — 71000039 HC RECOVERY, EACH ADD'L HOUR: Performed by: OBSTETRICS & GYNECOLOGY

## 2019-08-27 PROCEDURE — 82962 GLUCOSE BLOOD TEST: CPT | Performed by: OBSTETRICS & GYNECOLOGY

## 2019-08-27 PROCEDURE — 25000003 PHARM REV CODE 250: Performed by: NURSE ANESTHETIST, CERTIFIED REGISTERED

## 2019-08-27 PROCEDURE — 63600175 PHARM REV CODE 636 W HCPCS: Performed by: SPECIALIST

## 2019-08-27 PROCEDURE — 36000710: Performed by: OBSTETRICS & GYNECOLOGY

## 2019-08-27 PROCEDURE — 88307 TISSUE EXAM BY PATHOLOGIST: CPT | Mod: 26,,, | Performed by: PATHOLOGY

## 2019-08-27 PROCEDURE — 81025 URINE PREGNANCY TEST: CPT | Performed by: ANESTHESIOLOGY

## 2019-08-27 PROCEDURE — 63600175 PHARM REV CODE 636 W HCPCS: Performed by: NURSE ANESTHETIST, CERTIFIED REGISTERED

## 2019-08-27 PROCEDURE — 25000003 PHARM REV CODE 250: Performed by: OBSTETRICS & GYNECOLOGY

## 2019-08-27 PROCEDURE — 37000009 HC ANESTHESIA EA ADD 15 MINS: Performed by: OBSTETRICS & GYNECOLOGY

## 2019-08-27 PROCEDURE — 88307 TISSUE EXAM BY PATHOLOGIST: CPT | Performed by: PATHOLOGY

## 2019-08-27 PROCEDURE — 37000008 HC ANESTHESIA 1ST 15 MINUTES: Performed by: OBSTETRICS & GYNECOLOGY

## 2019-08-27 PROCEDURE — 25000003 PHARM REV CODE 250: Performed by: ANESTHESIOLOGY

## 2019-08-27 PROCEDURE — 88307 TISSUE SPECIMEN TO PATHOLOGY - SURGERY: ICD-10-PCS | Mod: 26,,, | Performed by: PATHOLOGY

## 2019-08-27 PROCEDURE — 36000711: Performed by: OBSTETRICS & GYNECOLOGY

## 2019-08-27 PROCEDURE — 27201423 OPTIME MED/SURG SUP & DEVICES STERILE SUPPLY: Performed by: OBSTETRICS & GYNECOLOGY

## 2019-08-27 PROCEDURE — 63600175 PHARM REV CODE 636 W HCPCS: Performed by: OBSTETRICS & GYNECOLOGY

## 2019-08-27 PROCEDURE — 63600175 PHARM REV CODE 636 W HCPCS: Performed by: ANESTHESIOLOGY

## 2019-08-27 PROCEDURE — 71000016 HC POSTOP RECOV ADDL HR: Performed by: OBSTETRICS & GYNECOLOGY

## 2019-08-27 PROCEDURE — 58571 TLH W/T/O 250 G OR LESS: CPT | Mod: ,,, | Performed by: OBSTETRICS & GYNECOLOGY

## 2019-08-27 PROCEDURE — 58571 PR LAPAROSCOPY W TOT HYSTERECTUTERUS <=250 GRAM  W TUBE/OVARY: ICD-10-PCS | Mod: ,,, | Performed by: OBSTETRICS & GYNECOLOGY

## 2019-08-27 RX ORDER — DIPHENHYDRAMINE HCL 25 MG
25 CAPSULE ORAL EVERY 4 HOURS PRN
Status: DISCONTINUED | OUTPATIENT
Start: 2019-08-27 | End: 2019-08-27 | Stop reason: HOSPADM

## 2019-08-27 RX ORDER — FENTANYL CITRATE 50 UG/ML
INJECTION, SOLUTION INTRAMUSCULAR; INTRAVENOUS
Status: DISCONTINUED | OUTPATIENT
Start: 2019-08-27 | End: 2019-08-27

## 2019-08-27 RX ORDER — BUPIVACAINE HYDROCHLORIDE 2.5 MG/ML
INJECTION, SOLUTION EPIDURAL; INFILTRATION; INTRACAUDAL
Status: DISCONTINUED | OUTPATIENT
Start: 2019-08-27 | End: 2019-08-27 | Stop reason: HOSPADM

## 2019-08-27 RX ORDER — KETOROLAC TROMETHAMINE 30 MG/ML
INJECTION, SOLUTION INTRAMUSCULAR; INTRAVENOUS
Status: DISCONTINUED | OUTPATIENT
Start: 2019-08-27 | End: 2019-08-27

## 2019-08-27 RX ORDER — HYDROMORPHONE HYDROCHLORIDE 2 MG/ML
0.4 INJECTION, SOLUTION INTRAMUSCULAR; INTRAVENOUS; SUBCUTANEOUS EVERY 5 MIN PRN
Status: DISCONTINUED | OUTPATIENT
Start: 2019-08-27 | End: 2019-08-27 | Stop reason: HOSPADM

## 2019-08-27 RX ORDER — HYDROCODONE BITARTRATE AND ACETAMINOPHEN 10; 325 MG/1; MG/1
1 TABLET ORAL EVERY 4 HOURS PRN
Status: DISCONTINUED | OUTPATIENT
Start: 2019-08-27 | End: 2019-08-27 | Stop reason: SDUPTHER

## 2019-08-27 RX ORDER — PROPOFOL 10 MG/ML
VIAL (ML) INTRAVENOUS
Status: DISCONTINUED | OUTPATIENT
Start: 2019-08-27 | End: 2019-08-27

## 2019-08-27 RX ORDER — OXYCODONE HYDROCHLORIDE 5 MG/1
5 TABLET ORAL
Status: DISCONTINUED | OUTPATIENT
Start: 2019-08-27 | End: 2019-08-27 | Stop reason: HOSPADM

## 2019-08-27 RX ORDER — ACETAMINOPHEN 500 MG
1000 TABLET ORAL
Status: COMPLETED | OUTPATIENT
Start: 2019-08-27 | End: 2019-08-27

## 2019-08-27 RX ORDER — SODIUM CHLORIDE 0.9 % (FLUSH) 0.9 %
3 SYRINGE (ML) INJECTION
Status: DISCONTINUED | OUTPATIENT
Start: 2019-08-27 | End: 2019-08-27 | Stop reason: HOSPADM

## 2019-08-27 RX ORDER — FAMOTIDINE 20 MG/1
20 TABLET, FILM COATED ORAL
Status: COMPLETED | OUTPATIENT
Start: 2019-08-27 | End: 2019-08-27

## 2019-08-27 RX ORDER — MIDAZOLAM HYDROCHLORIDE 1 MG/ML
INJECTION INTRAMUSCULAR; INTRAVENOUS
Status: DISCONTINUED | OUTPATIENT
Start: 2019-08-27 | End: 2019-08-27

## 2019-08-27 RX ORDER — DIPHENHYDRAMINE HYDROCHLORIDE 50 MG/ML
25 INJECTION INTRAMUSCULAR; INTRAVENOUS EVERY 6 HOURS PRN
Status: DISCONTINUED | OUTPATIENT
Start: 2019-08-27 | End: 2019-08-27 | Stop reason: SDUPTHER

## 2019-08-27 RX ORDER — HYDROCODONE BITARTRATE AND ACETAMINOPHEN 5; 325 MG/1; MG/1
1 TABLET ORAL EVERY 4 HOURS PRN
Status: DISCONTINUED | OUTPATIENT
Start: 2019-08-27 | End: 2019-08-27 | Stop reason: HOSPADM

## 2019-08-27 RX ORDER — HYDROCODONE BITARTRATE AND ACETAMINOPHEN 5; 325 MG/1; MG/1
1 TABLET ORAL EVERY 4 HOURS PRN
Qty: 20 TABLET | Refills: 0 | Status: SHIPPED | OUTPATIENT
Start: 2019-08-27 | End: 2019-10-03

## 2019-08-27 RX ORDER — ONDANSETRON 8 MG/1
8 TABLET, ORALLY DISINTEGRATING ORAL EVERY 8 HOURS PRN
Status: DISCONTINUED | OUTPATIENT
Start: 2019-08-27 | End: 2019-08-27

## 2019-08-27 RX ORDER — ONDANSETRON 2 MG/ML
INJECTION INTRAMUSCULAR; INTRAVENOUS
Status: DISCONTINUED | OUTPATIENT
Start: 2019-08-27 | End: 2019-08-27

## 2019-08-27 RX ORDER — MEPERIDINE HYDROCHLORIDE 25 MG/ML
12.5 INJECTION INTRAMUSCULAR; INTRAVENOUS; SUBCUTANEOUS ONCE AS NEEDED
Status: COMPLETED | OUTPATIENT
Start: 2019-08-27 | End: 2019-08-27

## 2019-08-27 RX ORDER — SODIUM CHLORIDE, SODIUM LACTATE, POTASSIUM CHLORIDE, CALCIUM CHLORIDE 600; 310; 30; 20 MG/100ML; MG/100ML; MG/100ML; MG/100ML
INJECTION, SOLUTION INTRAVENOUS CONTINUOUS
Status: DISCONTINUED | OUTPATIENT
Start: 2019-08-27 | End: 2019-08-27 | Stop reason: HOSPADM

## 2019-08-27 RX ORDER — LIDOCAINE HCL/PF 100 MG/5ML
SYRINGE (ML) INTRAVENOUS
Status: DISCONTINUED | OUTPATIENT
Start: 2019-08-27 | End: 2019-08-27

## 2019-08-27 RX ORDER — HYDROCODONE BITARTRATE AND ACETAMINOPHEN 5; 325 MG/1; MG/1
1 TABLET ORAL EVERY 4 HOURS PRN
Qty: 15 TABLET | Refills: 0 | Status: SHIPPED | OUTPATIENT
Start: 2019-08-27 | End: 2019-08-27 | Stop reason: SDUPTHER

## 2019-08-27 RX ORDER — ROCURONIUM BROMIDE 10 MG/ML
INJECTION, SOLUTION INTRAVENOUS
Status: DISCONTINUED | OUTPATIENT
Start: 2019-08-27 | End: 2019-08-27

## 2019-08-27 RX ORDER — DIPHENHYDRAMINE HCL 25 MG
25 CAPSULE ORAL EVERY 4 HOURS PRN
Status: DISCONTINUED | OUTPATIENT
Start: 2019-08-27 | End: 2019-08-27 | Stop reason: SDUPTHER

## 2019-08-27 RX ORDER — ONDANSETRON 2 MG/ML
4 INJECTION INTRAMUSCULAR; INTRAVENOUS DAILY PRN
Status: DISCONTINUED | OUTPATIENT
Start: 2019-08-27 | End: 2019-08-27 | Stop reason: HOSPADM

## 2019-08-27 RX ORDER — PREGABALIN 75 MG/1
75 CAPSULE ORAL
Status: COMPLETED | OUTPATIENT
Start: 2019-08-27 | End: 2019-08-27

## 2019-08-27 RX ORDER — IBUPROFEN 600 MG/1
600 TABLET ORAL EVERY 6 HOURS PRN
Status: DISCONTINUED | OUTPATIENT
Start: 2019-08-27 | End: 2019-08-27 | Stop reason: SDUPTHER

## 2019-08-27 RX ORDER — SCOLOPAMINE TRANSDERMAL SYSTEM 1 MG/1
1 PATCH, EXTENDED RELEASE TRANSDERMAL ONCE
Status: COMPLETED | OUTPATIENT
Start: 2019-08-27 | End: 2019-08-27

## 2019-08-27 RX ORDER — DIPHENHYDRAMINE HYDROCHLORIDE 50 MG/ML
25 INJECTION INTRAMUSCULAR; INTRAVENOUS EVERY 4 HOURS PRN
Status: DISCONTINUED | OUTPATIENT
Start: 2019-08-27 | End: 2019-08-27 | Stop reason: HOSPADM

## 2019-08-27 RX ORDER — HYDROCODONE BITARTRATE AND ACETAMINOPHEN 5; 325 MG/1; MG/1
1 TABLET ORAL EVERY 4 HOURS PRN
Status: DISCONTINUED | OUTPATIENT
Start: 2019-08-27 | End: 2019-08-27 | Stop reason: SDUPTHER

## 2019-08-27 RX ORDER — IBUPROFEN 600 MG/1
600 TABLET ORAL EVERY 6 HOURS PRN
Status: DISCONTINUED | OUTPATIENT
Start: 2019-08-27 | End: 2019-08-27 | Stop reason: HOSPADM

## 2019-08-27 RX ORDER — NEOSTIGMINE METHYLSULFATE 1 MG/ML
INJECTION, SOLUTION INTRAVENOUS
Status: DISCONTINUED | OUTPATIENT
Start: 2019-08-27 | End: 2019-08-27

## 2019-08-27 RX ORDER — IBUPROFEN 600 MG/1
600 TABLET ORAL EVERY 6 HOURS PRN
Qty: 30 TABLET | Refills: 1 | Status: SHIPPED | OUTPATIENT
Start: 2019-08-27 | End: 2019-08-27 | Stop reason: HOSPADM

## 2019-08-27 RX ORDER — CEFAZOLIN SODIUM 1 G/3ML
2 INJECTION, POWDER, FOR SOLUTION INTRAMUSCULAR; INTRAVENOUS
Status: COMPLETED | OUTPATIENT
Start: 2019-08-27 | End: 2019-08-27

## 2019-08-27 RX ORDER — SODIUM CHLORIDE 9 MG/ML
INJECTION, SOLUTION INTRAVENOUS CONTINUOUS
Status: DISCONTINUED | OUTPATIENT
Start: 2019-08-27 | End: 2019-08-27 | Stop reason: HOSPADM

## 2019-08-27 RX ORDER — GLYCOPYRROLATE 0.2 MG/ML
INJECTION INTRAMUSCULAR; INTRAVENOUS
Status: DISCONTINUED | OUTPATIENT
Start: 2019-08-27 | End: 2019-08-27

## 2019-08-27 RX ORDER — ONDANSETRON 8 MG/1
8 TABLET, ORALLY DISINTEGRATING ORAL EVERY 8 HOURS PRN
Status: DISCONTINUED | OUTPATIENT
Start: 2019-08-27 | End: 2019-08-27 | Stop reason: HOSPADM

## 2019-08-27 RX ORDER — DIPHENHYDRAMINE HYDROCHLORIDE 50 MG/ML
25 INJECTION INTRAMUSCULAR; INTRAVENOUS EVERY 4 HOURS PRN
Status: DISCONTINUED | OUTPATIENT
Start: 2019-08-27 | End: 2019-08-27 | Stop reason: SDUPTHER

## 2019-08-27 RX ORDER — HYDROCODONE BITARTRATE AND ACETAMINOPHEN 10; 325 MG/1; MG/1
1 TABLET ORAL EVERY 4 HOURS PRN
Status: DISCONTINUED | OUTPATIENT
Start: 2019-08-27 | End: 2019-08-27 | Stop reason: HOSPADM

## 2019-08-27 RX ADMIN — LIDOCAINE HYDROCHLORIDE 100 MG: 20 INJECTION, SOLUTION INTRAVENOUS at 01:08

## 2019-08-27 RX ADMIN — ACETAMINOPHEN 1000 MG: 500 TABLET, FILM COATED ORAL at 10:08

## 2019-08-27 RX ADMIN — PROMETHAZINE HYDROCHLORIDE 6.25 MG: 25 INJECTION INTRAMUSCULAR; INTRAVENOUS at 06:08

## 2019-08-27 RX ADMIN — FENTANYL CITRATE 25 MCG: 50 INJECTION, SOLUTION INTRAMUSCULAR; INTRAVENOUS at 03:08

## 2019-08-27 RX ADMIN — GLYCOPYRROLATE 0.4 MG: 0.2 INJECTION, SOLUTION INTRAMUSCULAR; INTRAVENOUS at 02:08

## 2019-08-27 RX ADMIN — FENTANYL CITRATE 50 MCG: 50 INJECTION, SOLUTION INTRAMUSCULAR; INTRAVENOUS at 01:08

## 2019-08-27 RX ADMIN — PREGABALIN 75 MG: 75 CAPSULE ORAL at 10:08

## 2019-08-27 RX ADMIN — FENTANYL CITRATE 25 MCG: 50 INJECTION, SOLUTION INTRAMUSCULAR; INTRAVENOUS at 02:08

## 2019-08-27 RX ADMIN — CARBOXYMETHYLCELLULOSE SODIUM 3 DROP: 2.5 SOLUTION/ DROPS OPHTHALMIC at 01:08

## 2019-08-27 RX ADMIN — PROPOFOL 180 MG: 10 INJECTION, EMULSION INTRAVENOUS at 01:08

## 2019-08-27 RX ADMIN — FAMOTIDINE 20 MG: 20 TABLET, FILM COATED ORAL at 10:08

## 2019-08-27 RX ADMIN — KETOROLAC TROMETHAMINE 30 MG: 30 INJECTION, SOLUTION INTRAMUSCULAR; INTRAVENOUS at 02:08

## 2019-08-27 RX ADMIN — MEPERIDINE HYDROCHLORIDE 12.5 MG: 25 INJECTION INTRAMUSCULAR; INTRAVENOUS; SUBCUTANEOUS at 03:08

## 2019-08-27 RX ADMIN — CEFAZOLIN 2 G: 330 INJECTION, POWDER, FOR SOLUTION INTRAMUSCULAR; INTRAVENOUS at 01:08

## 2019-08-27 RX ADMIN — FENTANYL CITRATE 100 MCG: 50 INJECTION, SOLUTION INTRAMUSCULAR; INTRAVENOUS at 01:08

## 2019-08-27 RX ADMIN — ROCURONIUM BROMIDE 40 MG: 10 INJECTION, SOLUTION INTRAVENOUS at 01:08

## 2019-08-27 RX ADMIN — NEOSTIGMINE METHYLSULFATE 3 MG: 1 INJECTION INTRAVENOUS at 02:08

## 2019-08-27 RX ADMIN — SODIUM CHLORIDE, SODIUM LACTATE, POTASSIUM CHLORIDE, AND CALCIUM CHLORIDE: 600; 310; 30; 20 INJECTION, SOLUTION INTRAVENOUS at 12:08

## 2019-08-27 RX ADMIN — SODIUM CHLORIDE, SODIUM LACTATE, POTASSIUM CHLORIDE, AND CALCIUM CHLORIDE: 600; 310; 30; 20 INJECTION, SOLUTION INTRAVENOUS at 01:08

## 2019-08-27 RX ADMIN — SCOPALAMINE 1 PATCH: 1 PATCH, EXTENDED RELEASE TRANSDERMAL at 10:08

## 2019-08-27 RX ADMIN — ONDANSETRON 4 MG: 2 INJECTION INTRAMUSCULAR; INTRAVENOUS at 02:08

## 2019-08-27 RX ADMIN — MIDAZOLAM HYDROCHLORIDE 2 MG: 1 INJECTION, SOLUTION INTRAMUSCULAR; INTRAVENOUS at 12:08

## 2019-08-27 NOTE — DISCHARGE INSTRUCTIONS
Discharge Instructions for Laparoscopic Hysterectomy  You had a procedure called laparoscopic hysterectomy. A surgeon removed your uterus using instruments inserted through small incisions in your abdomen. These incisions may be tender or sore. You may also have pain in your upper back or shoulders. This is from the gas used to enlarge your abdomen to allow your doctor to see inside your pelvis and perform the procedure. This pain usually goes away in a day or two. It usually takes from 1-4 weeks to recover from laparoscopic hysterectomy. Remember, though, that recovery time varies from woman to woman. Here's what you can do to speed your recovery following surgery.    Home Care   · Continue the coughing and deep breathing exercises that you learned in the hospital.  · Take your medications exactly as directed by your doctor.  · Avoid constipation.  ¨ Eat fruits, vegetables, and whole grains.  ¨ Drink 6-8 glasses of water a day, unless told to do otherwise.  ¨ Use a laxative or a mild stool softener if your doctor says it's okay.  · Shower as usual in 24 hours. Wash your incisions with mild soap and water. Pat dry.  Avoid baths, swimming pools and hot tubs until  seen by your physician for a post-op follow up.  · Don't use oils, powders, or lotions on your incisions.  · You should not have any sexual activity for six weeks.  This can cause severe bleeding. You should not insert anything into the vagina for six weeks, no tampons or douching.  · If you had both ovaries removed, report hot flashes, mood swings, and irritability to your doctor. There may be medications that can help you.    Activity  · Ask your doctor when you can start driving again. It's usually okay to drive as soon as you are free of pain and able to move comfortably from side to side. Don't drive while you are still taking narcotic pain medications.  · Ask others to help with chores and errands while you recover.  · Dont lift anything  heavier than 10 pounds for 4 weeks.  · Dont vacuum or do other strenuous activities until the doctor says it's okay.  · Walk as often as you feel able.  · Climb stairs slowly and pause after every few steps.    Follow-Up  Make a follow-up appointment as directed by our staff.     When to Call Your Doctor  Call your doctor right away if you have any of the following:  · Fever above 100.4°F (38°C) or chills  · Bright red vaginal bleeding or vaginal bleeding that soaks more than one sanitary pad per hour  · A foul smelling discharge from the vagina  · Trouble urinating or burning when you urinate  · Severe pain or bloating in your abdomen  · Redness, swelling, or drainage at your incision sites  · Shortness of breath or chest pain   © 1857-9408 [facility], [address] [place] [address]. All rights reserved. This information is not intended as a substitute for professional medical care. Always follow your healthcare professional's instructions.        Discharge Instructions: After Your Surgery  Youve just had surgery. During surgery, you were given medicine called anesthesia to keep you relaxed and free of pain. After surgery, you may have some pain or nausea. This is common. Here are some tips for feeling better and getting well after surgery.     Stay on schedule with your medicine.   Going home  Your healthcare provider will show you how to take care of yourself when you go home. He or she will also answer your questions. Have an adult family member or friend drive you home. For the first 24 hours after your surgery:  · Do not drive or use heavy equipment.  · Do not make important decisions or sign legal papers.  · Do not drink alcohol.  · Have someone stay with you, if needed. He or she can watch for problems and help keep you safe.  Be sure to go to all follow-up visits with your healthcare provider. And rest after your surgery for as long as your healthcare provider tells you to.  Coping with  pain  If you have pain after surgery, pain medicine will help you feel better. Take it as told, before pain becomes severe. Also, ask your healthcare provider or pharmacist about other ways to control pain. This might be with heat, ice, or relaxation. And follow any other instructions your surgeon or nurse gives you.  Tips for taking pain medicine  To get the best relief possible, remember these points:  · Pain medicines can upset your stomach. Taking them with a little food may help.  · Most pain relievers taken by mouth need at least 20 to 30 minutes to start to work.  · Taking medicine on a schedule can help you remember to take it. Try to time your medicine so that you can take it before starting an activity. This might be before you get dressed, go for a walk, or sit down for dinner.  · Constipation is a common side effect of pain medicines. Call your healthcare provider before taking any medicines such as laxatives or stool softeners to help ease constipation. Also ask if you should skip any foods. Drinking lots of fluids and eating foods such as fruits and vegetables that are high in fiber can also help. Remember, do not take laxatives unless your surgeon has prescribed them.  · Drinking alcohol and taking pain medicine can cause dizziness and slow your breathing. It can even be deadly. Do not drink alcohol while taking pain medicine.  · Pain medicine can make you react more slowly to things. Do not drive or run machinery while taking pain medicine.  Your healthcare provider may tell you to take acetaminophen to help ease your pain. Ask him or her how much you are supposed to take each day. Acetaminophen or other pain relievers may interact with your prescription medicines or other over-the-counter (OTC) medicines. Some prescription medicines have acetaminophen and other ingredients. Using both prescription and OTC acetaminophen for pain can cause you to overdose. Read the labels on your OTC medicines with  care. This will help you to clearly know the list of ingredients, how much to take, and any warnings. It may also help you not take too much acetaminophen. If you have questions or do not understand the information, ask your pharmacist or healthcare provider to explain it to you before you take the OTC medicine.  Managing nausea  Some people have an upset stomach after surgery. This is often because of anesthesia, pain, or pain medicine, or the stress of surgery. These tips will help you handle nausea and eat healthy foods as you get better. If you were on a special food plan before surgery, ask your healthcare provider if you should follow it while you get better. These tips may help:  · Do not push yourself to eat. Your body will tell you when to eat and how much.  · Start off with clear liquids and soup. They are easier to digest.  · Next try semi-solid foods, such as mashed potatoes, applesauce, and gelatin, as you feel ready.  · Slowly move to solid foods. Dont eat fatty, rich, or spicy foods at first.  · Do not force yourself to have 3 large meals a day. Instead eat smaller amounts more often.  · Take pain medicines with a small amount of solid food, such as crackers or toast, to avoid nausea.     Call your surgeon if  · You still have pain an hour after taking medicine. The medicine may not be strong enough.  · You feel too sleepy, dizzy, or groggy. The medicine may be too strong.  · You have side effects like nausea, vomiting, or skin changes, such as rash, itching, or hives.       If you have obstructive sleep apnea  You were given anesthesia medicine during surgery to keep you comfortable and free of pain. After surgery, you may have more apnea spells because of this medicine and other medicines you were given. The spells may last longer than usual.   At home:  · Keep using the continuous positive airway pressure (CPAP) device when you sleep. Unless your healthcare provider tells you not to, use it when  you sleep, day or night. CPAP is a common device used to treat obstructive sleep apnea.  · Talk with your provider before taking any pain medicine, muscle relaxants, or sedatives. Your provider will tell you about the possible dangers of taking these medicines.  Date Last Reviewed: 12/1/2016  © 8812-8534 [employer]. [address] [place] [address], [place] [address]. All rights reserved. This information is not intended as a substitute for professional medical care. Always follow your healthcare professional's instructions.

## 2019-08-27 NOTE — ANESTHESIA POSTPROCEDURE EVALUATION
Anesthesia Post Evaluation    Patient: Shena Gomez    Procedure(s) Performed: Procedure(s) (LRB):  HYSTERECTOMY, TOTAL, LAPAROSCOPIC (N/A)  CYSTOSCOPY (N/A)  SALPINGECTOMY, LAPAROSCOPIC (Bilateral)    Final Anesthesia Type: general  Patient location during evaluation: PACU  Patient participation: Yes- Able to Participate  Level of consciousness: awake and alert  Post-procedure vital signs: reviewed and stable  Pain management: adequate  Airway patency: patent  PONV status at discharge: No PONV  Anesthetic complications: no      Cardiovascular status: blood pressure returned to baseline  Respiratory status: unassisted and spontaneous ventilation  Hydration status: euvolemic  Follow-up not needed.          Vitals Value Taken Time   /65 8/27/2019  4:00 PM   Temp 37.3 °C (99.2 °F) 8/27/2019 10:20 AM   Pulse 71 8/27/2019  4:03 PM   Resp 18 8/27/2019 10:20 AM   SpO2 100 % 8/27/2019  4:03 PM   Vitals shown include unvalidated device data.      No case tracking events are documented in the log.      Pain/Mckenzie Score: Pain Rating Prior to Med Admin: 0 (8/27/2019 10:25 AM)

## 2019-08-27 NOTE — DISCHARGE SUMMARY
[facility]  Brief Operative Note     SUMMARY     Surgery Date: 8/27/2019     Surgeon(s) and Role:     * Yokasta John MD - Primary    Assisting Surgeon: Adalgisa Daigle MD- Resident    Pre-op Diagnosis:  Fibroids, intramural [D25.1]    Post-op Diagnosis:  Post-Op Diagnosis Codes:     * Fibroids, intramural [D25.1]    Procedure(s) (LRB):  HYSTERECTOMY, TOTAL, LAPAROSCOPIC (N/A)  CYSTOSCOPY (N/A)  SALPINGECTOMY, LAPAROSCOPIC (Bilateral)    Anesthesia: General    Description of the findings of the procedure:   - Omental adhesions to anterior abdominal wall, taken down with Ligasure  - Anterior uterus adherent to bladder, taken down with Ligasure, monopolar cautery, and blunt dissection  - Normal fallopian tubes and ovaries bilaterally, normal upper abdominal survey  - TLH/BS performed without complication  - Vaginal cuff closed using endostitch  - Cystoscopy without evidence of bladder injury, bilateral ureteral efflux visualized  - Hemostasis confirmed at close of procedure       Estimated Blood Loss: 100 mL         Specimens:   Specimen (12h ago, onward)    Start     Ordered    08/27/19 1442  Specimen to Pathology - Surgery  Once     Comments:  1. UTERUS, CERVIX, BILATERAL TUBES     Start Status     08/27/19 1442 Collected (08/27/19 1448) Order ID: [id number]       08/27/19 1448          Discharge Note    SUMMARY     Admit Date: 8/27/2019    Discharge Date and Time:  08/27/2019 3:08 PM    Hospital Course (synopsis of major diagnoses, care, treatment, and services provided during the course of the hospital stay): Patient presented for scheduled procedure. Patient was passed back to OR for TLH/BS. Please see OP note for further details. Tolerated procedure well and patient was taken to recovery in a stable condition. Prior to discharge patient was able to void, ambulate, tolerate PO and pain was well controlled with PO meds. Patient was given routine post-op instructions for which patient  voiced understanding. Patient was subsequently discharged home.       Final Diagnosis: Post-Op Diagnosis Codes:     * Fibroids, intramural [D25.1]    Disposition: Home or Self Care    Follow Up/Patient Instructions:     Medications:  Reconciled Home Medications:      Medication List      START taking these medications    HYDROcodone-acetaminophen 5-325 mg per tablet  Commonly known as:  NORCO  Take 1 tablet by mouth every 4 (four) hours as needed for Pain.        CONTINUE taking these medications    FLUoxetine 10 MG capsule  Take daily for 14 days each month- 2 weeks prior to your period     ranitidine 300 MG tablet  Commonly known as:  ZANTAC  Take 1 tablet (300 mg total) by mouth every evening.     tamsulosin 0.4 mg Cap  Commonly known as:  FLOMAX  Take 1 capsule (0.4 mg total) by mouth once daily.     TYLENOL EXTRA STRENGTH ORAL  Take 1,000 mg by mouth daily as needed.          Discharge Procedure Orders   Diet general     Diet general     Call MD for:  extreme fatigue     Call MD for:  persistent dizziness or light-headedness     Call MD for:  hives     Call MD for:  redness, tenderness, or signs of infection (pain, swelling, redness, odor or green/yellow discharge around incision site)     Call MD for:  difficulty breathing, headache or visual disturbances     Call MD for:  severe uncontrolled pain     Call MD for:  persistent nausea and vomiting     Call MD for:  temperature >100.4     Other restrictions (specify):   Order Comments: Pelvic rest for 6 weeks or until cleared in clinic (no tampons, no douching, no intercourse, etc.).     Follow-up Information     Yokasta John MD In 6 weeks.    Specialties:  Obstetrics, Obstetrics and Gynecology  Why:  Post Operative Appointment  Contact information:  [address] [place] [id number] [phone]

## 2019-08-27 NOTE — OR NURSING
"The patient is lying on the stretcher in a light sleep, awakens to voice, VSS, Afebrile, pain reassessed on a scale of 0-10 and the patient state "none". I called the patient  with an update.     Transfer of the patient care to Indu VANEGAS RN   "

## 2019-08-27 NOTE — TRANSFER OF CARE
Anesthesia Transfer of Care Note    Patient: Shena Gomez    Procedure(s) Performed: Procedure(s) (LRB):  HYSTERECTOMY, TOTAL, LAPAROSCOPIC (N/A)  CYSTOSCOPY (N/A)  SALPINGECTOMY, LAPAROSCOPIC (Bilateral)    Patient location: PACU    Anesthesia Type: general    Transport from OR: Transported from OR on 2-3 L/min O2 by NC with adequate spontaneous ventilation    Post pain: adequate analgesia    Post assessment: no apparent anesthetic complications and tolerated procedure well    Post vital signs: stable    Level of consciousness: awake, alert and oriented    Nausea/Vomiting: no nausea/vomiting    Complications: none    Transfer of care protocol was followed      Last vitals:   Visit Vitals  /68 (BP Location: Left arm, Patient Position: Lying)   Pulse 88   Temp 37.3 °C (99.2 °F) (Oral)   Resp 18   Ht 5' (1.524 m)   Wt 48.1 kg (106 lb)   LMP 08/26/2019   SpO2 98%   Breastfeeding? No   BMI 20.70 kg/m²

## 2019-08-28 ENCOUNTER — PATIENT MESSAGE (OUTPATIENT)
Dept: OBSTETRICS AND GYNECOLOGY | Facility: CLINIC | Age: 34
End: 2019-08-28

## 2019-08-28 NOTE — PLAN OF CARE
Shena Gomez has met all discharge criteria from Phase II. Vital Signs are stable, ambulating  without difficulty. Discharge instructions given, patient verbalized understanding. Discharged from facility via wheelchair in stable condition.

## 2019-08-28 NOTE — OP NOTE
OPERATIVE REPORT    DATE: (date: 08/28/2019)    PREOPERATIVE DIAGNOSIS  1. Menorrhagia  2. Fibroids    POSTOPERATIVE DIAGNOSIS  1. Menorrhagia  2. Fibroids    PROCEDURE:  1. Lysis of adhesions  2. Total Laparoscopic Hysterectomy  3. Cystoscopy    SURGEON: Dr. Yokasta John    ASSISTANT: Dr. Adalgisa Rios    ANESTHESIA: General    COMPLICATIONS: None    EBL: 100 cc    IV FLUIDS: 1800 cc    URINE OUTPUT: 150 cc    FINDINGS: Enlarged uterus approximately 14 week size, anterior face of the uterus adhesed to the anterior abdominal wall, normal tubes and ovaries bilaterally.  Bladder intact and both ureters effluxing urine during cystoscopy.      PROCEDURE: Patient was taking to the operating room where general anesthesia was administered and found to be adequate.  She was prepped and draped in the dorsal lithotomy position.  A weighted sterile speculum was placed in the vagina.  The anterior lip of the cervix was grasped with a single tooth tenaculum.  The uterus was sounded to approximately 8 cm.  A stay suture of 0-Vicryl was placed at 12 o'clock and 6 o'clock on the cervix.  A YESSENIA manipulator was placed inside the endometrial cavity.      Gloves were changed.  A Veress needle was inserted into the umbilicus under tenting of the anterior abdominal wall.  Placement into the peritoneal cavity was confirmed via saline drop test.  The abdomen was insufflated to 15mm Hg using Carbon dioxide.  A 10 mm supraumbilical skin incision was made with the scalpel.  A 10 mm Optiview trocar was advanced through this incision.  Excellent hemostasis was noted.  The patient was placed in deep Trendelenburg.  An 5 mm left lateral skin incision was made with a scalpel and a 5 mm trocar was advanced through this incision under visualization of the camera.  A 5 mm right lateral skin incision was made with the scalpel.  A 5 mm trocar was advanced through this incision under visualization of the camera.  Excellent hemostasis was noted.       The uterus was found to be adherent to the anterior abdominal wall.  This involved omental adhesions but no bowel involvement.  Using the Ligasure, the adhesions were freed.  Attention was turned to the left round ligament.  This was cauterized and transected and continued anteriorly to begin creating the bladder flap to the midline.  This was done with some difficulty due to scarring of the bladder to the cervix and uterus.  Attention was turned to the left fallopian tube.  The fimbria was identified, elevated the mesosalpinx was cauterized and transected to the level of the utero-ovarian ligament.  This was cauterized and transected and carried down to the level of the uterine vessels.  The vessels were cauterized but not transected.  Attention was turned to the right round ligament.  It was cauterized and transected and continued anteriorly to create the bladder flap.  Again this was done with some difficulty due to scarring of the bladder.  Attention was turned to the right fallopian tube.  The fimbria was grasped, elevated and the mesosalpinx was cauterized and transected to the level of the utero-ovarian ligament.  This was cauterized and transected and carried down to the level of the uterine vessels.  The vessels were cauterized but not transected.  Attention was turned to the anterior portion of the cervix.  The bladder was carefully dissected off the cervix.  The anterior colpotomy was created.  This was continued to the level of the uterine vessels bilaterally.  Attention was turned to the posterior portion of the uterus.  The posterior colpotomy was created and carried around to the level of the uterine vessels bilaterally.  The left uterine vessels were again cauterized and transected.  The anterior and posterior colpotomies were connected.  The right uterine vessels were cauterized and transected.  The anterior and posterior colpotomies were connected.        The uterus was removed vaginally.  A  moist laparotomy sponge inside of glove was placed in the vagina to maintain intraperitoneal pressure.  The vaginal cuff was reapproximated in an interrupted fashion using 0-Vicryl suture using the Endostitch.  The pelvis was copiously irrigated and suctioned.  Excellent hemostasis was noted.      The laparotomy sponge and glove was removed.  The vaginal cuff was palpated and found to be intact.  The marsh catheter was removed.  A cystoscope was advanced through the urethra.  The bladder was inspected and found to be intact.  Both ureters were seen effluxing urine.  The bladder was drained and the cystoscope was removed.    Attention was turned to the anterior abdominal wall. The abdomen was deflated and all trocars were removed.  The skin was closed with 4-0 Monocryl in a subcuticular fashion.  The incisions were injected with .25% Marcaine.  Sponge, lap, and needle counts were correct x 2.  The patient was taken to the recovery room in stable condition.

## 2019-08-29 ENCOUNTER — PATIENT MESSAGE (OUTPATIENT)
Dept: OBSTETRICS AND GYNECOLOGY | Facility: CLINIC | Age: 34
End: 2019-08-29

## 2019-09-01 ENCOUNTER — NURSE TRIAGE (OUTPATIENT)
Dept: ADMINISTRATIVE | Facility: CLINIC | Age: 34
End: 2019-09-01

## 2019-09-02 ENCOUNTER — PATIENT MESSAGE (OUTPATIENT)
Dept: OBSTETRICS AND GYNECOLOGY | Facility: CLINIC | Age: 34
End: 2019-09-02

## 2019-09-04 ENCOUNTER — PATIENT MESSAGE (OUTPATIENT)
Dept: OBSTETRICS AND GYNECOLOGY | Facility: CLINIC | Age: 34
End: 2019-09-04

## 2019-09-05 DIAGNOSIS — N39.9 URINARY PROBLEM IN FEMALE: Primary | ICD-10-CM

## 2019-09-08 ENCOUNTER — PATIENT MESSAGE (OUTPATIENT)
Dept: OBSTETRICS AND GYNECOLOGY | Facility: CLINIC | Age: 34
End: 2019-09-08

## 2019-09-08 DIAGNOSIS — N32.89 BLADDER SPASMS: Primary | ICD-10-CM

## 2019-09-10 ENCOUNTER — PATIENT MESSAGE (OUTPATIENT)
Dept: UROLOGY | Facility: CLINIC | Age: 34
End: 2019-09-10

## 2019-09-13 ENCOUNTER — PATIENT MESSAGE (OUTPATIENT)
Dept: OBSTETRICS AND GYNECOLOGY | Facility: CLINIC | Age: 34
End: 2019-09-13

## 2019-09-13 PROBLEM — D25.1 FIBROIDS, INTRAMURAL: Status: RESOLVED | Noted: 2019-08-27 | Resolved: 2019-09-13

## 2019-09-13 RX ORDER — PHENAZOPYRIDINE HYDROCHLORIDE 200 MG/1
200 TABLET, FILM COATED ORAL 3 TIMES DAILY PRN
Qty: 9 TABLET | Refills: 0 | Status: SHIPPED | OUTPATIENT
Start: 2019-09-13 | End: 2019-09-16

## 2019-09-15 ENCOUNTER — NURSE TRIAGE (OUTPATIENT)
Dept: ADMINISTRATIVE | Facility: CLINIC | Age: 34
End: 2019-09-15

## 2019-09-15 ENCOUNTER — PATIENT MESSAGE (OUTPATIENT)
Dept: OBSTETRICS AND GYNECOLOGY | Facility: CLINIC | Age: 34
End: 2019-09-15

## 2019-09-16 ENCOUNTER — TELEPHONE (OUTPATIENT)
Dept: OBSTETRICS AND GYNECOLOGY | Facility: CLINIC | Age: 34
End: 2019-09-16

## 2019-09-16 ENCOUNTER — PATIENT MESSAGE (OUTPATIENT)
Dept: OBSTETRICS AND GYNECOLOGY | Facility: CLINIC | Age: 34
End: 2019-09-16

## 2019-09-16 NOTE — TELEPHONE ENCOUNTER
Returned call to the patient. Patient didn't have bleeding after her Sx until last night. Patient stated that she has done light walking, no intercourse, and no strenuous activity. Patient informed that she can have some bleeding during her post op period and that it is normal. Patient instructed to report to the ER for heavy bleeding causing her to saturate more than one 8 hour pad or super plus tampon in 1 hour. Patient verbalized understanding.

## 2019-09-16 NOTE — TELEPHONE ENCOUNTER
Reason for Disposition   Other post-op symptom or question    Additional Information   Negative: Sounds like a life-threatening emergency to the triager   Negative: Chest pain   Negative: Difficulty breathing   Negative: Surgical incision symptoms and questions   Negative: [1] Discomfort (pain, burning or stinging) when passing urine AND [2] male   Negative: [1] Discomfort (pain, burning or stinging) when passing urine AND [2] female   Negative: Constipation   Negative: New or worsening leg (calf, thigh) pain   Negative: New or worsening leg swelling   Negative: Dizziness is severe, or persists > 24 hours after surgery   Negative: Pain, redness, swelling, or pus at IV Site   Negative: Symptoms arising from use of a urinary catheter (Phillips or Coude)   Negative: Cast problems or questions   Negative: Medication question   Negative: [1] Widespread rash AND [2] bright red, sunburn-like   Negative: [1] Severe headache AND [2] after spinal (epidural) anesthesia   Negative: [1] Vomiting AND [2] persists > 4 hours   Negative: [1] Vomiting AND [2] abdomen looks much more swollen than usual   Negative: [1] Drinking very little AND [2] dehydration suspected (e.g., no urine > 12 hours, very dry mouth, very lightheaded)   Negative: Patient sounds very sick or weak to the triager   Negative: Sounds like a serious complication to the triager   Negative: Fever > 100.5 F (38.1 C)   Negative: [1] SEVERE post-op pain (e.g., excruciating, pain scale 8-10) AND [2] not controlled with pain medications   Negative: [1] Caller has URGENT question AND [2] triager unable to answer question   Negative: [1] Headache AND [2] after spinal (epidural) anesthesia AND [3] not severe   Negative: Fever present > 3 days (72 hours)   Negative: [1] MILD-MODERATE post-op pain (e.g., pain scale 1-7) AND [2] not controlled with pain medications   Negative: [1] Caller has NON-URGENT question AND [2] triager unable to answer  question   Negative: General activity, questions about   Negative: Resuming driving, questions about   Negative: Resuming sexual relations, questions about   Negative: Getting the incision wet, questions about   Negative: [1] Vomiting AND [2] present < 4 hours    Protocols used: POST-OP SYMPTOMS AND [id number]    Shena had about a teaspoon of vaginal bleeding tonight. She is about 3 week post op hysterectomy. She has no other symptoms. Advised that is normal to have some bleeding even at three weeks post op. Advised if bleeding increases or any other new/worsening symptoms to call back.

## 2019-09-16 NOTE — TELEPHONE ENCOUNTER
----- Message from Lucita Jacobs sent at 9/16/2019  8:46 AM CDT -----  Contact: pt  Name of Who is Calling: pt    What is the request in detail: states she is starting to experiencing vaginal bleeding since her procedure 3 weeks ago. Please contact to further discuss and advise      Can the clinic reply by [facility]: no    What Number to Call Back if not in [facility]: [phone]

## 2019-09-19 ENCOUNTER — HOSPITAL ENCOUNTER (EMERGENCY)
Facility: OTHER | Age: 34
Discharge: HOME | End: 2019-09-19
Attending: EMERGENCY MEDICINE
Payer: COMMERCIAL

## 2019-09-19 ENCOUNTER — PATIENT MESSAGE (OUTPATIENT)
Dept: OBSTETRICS AND GYNECOLOGY | Facility: CLINIC | Age: 34
End: 2019-09-19

## 2019-09-19 ENCOUNTER — TELEPHONE (OUTPATIENT)
Dept: OBSTETRICS AND GYNECOLOGY | Facility: CLINIC | Age: 34
End: 2019-09-19

## 2019-09-19 VITALS
BODY MASS INDEX: 20.81 KG/M2 | TEMPERATURE: 97 F | OXYGEN SATURATION: 100 % | RESPIRATION RATE: 16 BRPM | WEIGHT: 106 LBS | SYSTOLIC BLOOD PRESSURE: 107 MMHG | HEIGHT: 60 IN | HEART RATE: 105 BPM | DIASTOLIC BLOOD PRESSURE: 70 MMHG

## 2019-09-19 DIAGNOSIS — N99.820 POSTOPERATIVE VAGINAL BLEEDING FOLLOWING GENITOURINARY PROCEDURE: Primary | ICD-10-CM

## 2019-09-19 LAB
ABO + RH BLD: NORMAL
ALBUMIN SERPL BCP-MCNC: 3.4 G/DL (ref 3.5–5.2)
ALP SERPL-CCNC: 66 U/L (ref 55–135)
ALT SERPL W/O P-5'-P-CCNC: 7 U/L (ref 10–44)
ANION GAP SERPL CALC-SCNC: 11 MMOL/L (ref 8–16)
AST SERPL-CCNC: 16 U/L (ref 10–40)
BASOPHILS # BLD AUTO: 0.04 K/UL (ref 0–0.2)
BASOPHILS NFR BLD: 0.5 % (ref 0–1.9)
BILIRUB SERPL-MCNC: 0.3 MG/DL (ref 0.1–1)
BLD GP AB SCN CELLS X3 SERPL QL: NORMAL
BUN SERPL-MCNC: 11 MG/DL (ref 6–20)
CALCIUM SERPL-MCNC: 8.8 MG/DL (ref 8.7–10.5)
CHLORIDE SERPL-SCNC: 106 MMOL/L (ref 95–110)
CO2 SERPL-SCNC: 21 MMOL/L (ref 23–29)
CREAT SERPL-MCNC: 0.7 MG/DL (ref 0.5–1.4)
DIFFERENTIAL METHOD: NORMAL
EOSINOPHIL # BLD AUTO: 0 K/UL (ref 0–0.5)
EOSINOPHIL NFR BLD: 0.4 % (ref 0–8)
ERYTHROCYTE [DISTWIDTH] IN BLOOD BY AUTOMATED COUNT: 12.8 % (ref 11.5–14.5)
EST. GFR  (AFRICAN AMERICAN): >60 ML/MIN/1.73 M^2
EST. GFR  (NON AFRICAN AMERICAN): >60 ML/MIN/1.73 M^2
GLUCOSE SERPL-MCNC: 80 MG/DL (ref 70–110)
HCT VFR BLD AUTO: 39.4 % (ref 37–48.5)
HGB BLD-MCNC: 13.1 G/DL (ref 12–16)
IMM GRANULOCYTES # BLD AUTO: 0.02 K/UL (ref 0–0.04)
IMM GRANULOCYTES NFR BLD AUTO: 0.3 % (ref 0–0.5)
LYMPHOCYTES # BLD AUTO: 2.3 K/UL (ref 1–4.8)
LYMPHOCYTES NFR BLD: 30.7 % (ref 18–48)
MCH RBC QN AUTO: 28.6 PG (ref 27–31)
MCHC RBC AUTO-ENTMCNC: 33.2 G/DL (ref 32–36)
MCV RBC AUTO: 86 FL (ref 82–98)
MONOCYTES # BLD AUTO: 0.4 K/UL (ref 0.3–1)
MONOCYTES NFR BLD: 5.7 % (ref 4–15)
NEUTROPHILS # BLD AUTO: 4.6 K/UL (ref 1.8–7.7)
NEUTROPHILS NFR BLD: 62.4 % (ref 38–73)
NRBC BLD-RTO: 0 /100 WBC
PLATELET # BLD AUTO: 283 K/UL (ref 150–350)
PMV BLD AUTO: 9.7 FL (ref 9.2–12.9)
POTASSIUM SERPL-SCNC: 3.4 MMOL/L (ref 3.5–5.1)
PROT SERPL-MCNC: 7.1 G/DL (ref 6–8.4)
RBC # BLD AUTO: 4.58 M/UL (ref 4–5.4)
SODIUM SERPL-SCNC: 138 MMOL/L (ref 136–145)
WBC # BLD AUTO: 7.33 K/UL (ref 3.9–12.7)

## 2019-09-19 PROCEDURE — 99242 OFF/OP CONSLTJ NEW/EST SF 20: CPT | Mod: 24,,, | Performed by: OBSTETRICS & GYNECOLOGY

## 2019-09-19 PROCEDURE — 86850 RBC ANTIBODY SCREEN: CPT

## 2019-09-19 PROCEDURE — 80053 COMPREHEN METABOLIC PANEL: CPT

## 2019-09-19 PROCEDURE — 85025 COMPLETE CBC W/AUTO DIFF WBC: CPT

## 2019-09-19 PROCEDURE — 99242 PR OFFICE CONSULTATION,LEVEL II: ICD-10-PCS | Mod: 24,,, | Performed by: OBSTETRICS & GYNECOLOGY

## 2019-09-19 PROCEDURE — 25000003 PHARM REV CODE 250: Performed by: STUDENT IN AN ORGANIZED HEALTH CARE EDUCATION/TRAINING PROGRAM

## 2019-09-19 PROCEDURE — 99284 EMERGENCY DEPT VISIT MOD MDM: CPT | Mod: 25

## 2019-09-19 RX ORDER — SILVER NITRATE 38.21; 12.74 MG/1; MG/1
1 STICK TOPICAL
Status: COMPLETED | OUTPATIENT
Start: 2019-09-19 | End: 2019-09-19

## 2019-09-19 RX ADMIN — SILVER NITRATE APPLICATORS 1 APPLICATOR: 25; 75 STICK TOPICAL at 05:09

## 2019-09-19 NOTE — ED PROVIDER NOTES
Encounter Date: 2019       History     Chief Complaint   Patient presents with    Vaginal Bleeding     Vag bleeding for the past two hours saturating two pads. Pt had hysterectomy three weeks ago and was told to report to the ED for heavy bleeding.      Patient is a 34 y.o. female presenting to the emergency department with complaints of vaginal bleeding.  The patient states that she had a laparoscopic hysterectomy performed on 2019 by Dr. John for uterine fibroids.  She states that she been doing well postoperatively until about 2-3 days ago when she had some light vaginal spotting.  She states that it resolved without any other issues.  She states that about 2-3 hours ago, she started experiencing bright red vaginal bleeding.  She states that she passed multiple blood clots.  She denies any significant pain. She states she is concerned that this is a complication of the surgery.  She reports some general abdominal discomfort status post a surgery however has not required any medication to treat it.This is the extent of the patient's complaints at this time.       The history is provided by the patient.     Review of patient's allergies indicates:   Allergen Reactions    Celestone [betamethasone] Palpitations     Past Medical History:   Diagnosis Date    Kidney stones     history     Past Surgical History:   Procedure Laterality Date    cercalage      CERCLAGE, CERVIX N/A 2013    Performed by Bossman K[facility]alski III, [facility] at [facility] L[facility]D     SECTION       SECTION N/A 2014    Performed by Barrera Martínez Jr., MD at [facility] L&D    CYSTOSCOPY N/A 2019    Performed by Yokasta John MD at [facility] OR    HYSTERECTOMY, TOTAL, LAPAROSCOPIC N/A 2019    [facility]formed by [facility] at [facility] OR    SALPINGECTOMY, LAPAROSCOPIC Bilateral 2019    Performed by [facility] at [facility] OR    TUBAL LIGATION  14     Family History   Problem  Relation Age of Onset    Breast cancer Maternal Grandmother 68    Breast cancer Maternal Aunt 56    Esophageal cancer Maternal Aunt     Cancer Neg Hx     Colon cancer Neg Hx     Diabetes Neg Hx     Eclampsia Neg Hx     Miscarriages / Stillbirths Neg Hx     Hypertension Neg Hx     Ovarian cancer Neg Hx      labor Neg Hx     Stroke Neg Hx      Social History     Tobacco Use    Smoking status: Never Smoker    Smokeless tobacco: Never Used   Substance Use Topics    Alcohol use: Never     Alcohol/week: 0.0 oz    Drug use: Never     Review of Systems   Constitutional: Negative for activity change, appetite change, chills, fatigue and fever.   HENT: Negative for congestion, rhinorrhea and sore throat.    Eyes: Negative for photophobia and visual disturbance.   Respiratory: Negative for cough, shortness of breath and wheezing.    Cardiovascular: Negative for chest pain.   Gastrointestinal: Negative for abdominal pain, diarrhea, nausea and vomiting.   Genitourinary: Positive for vaginal bleeding. Negative for dysuria, hematuria and urgency.   Musculoskeletal: Negative for back pain, myalgias and neck pain.   Skin: Negative for color change and wound.   Neurological: Negative for weakness and headaches.   Psychiatric/Behavioral: Negative for agitation and confusion.       Physical Exam     Initial Vitals [19 1527]   BP Pulse Resp Temp SpO2   (!) 112/58 (!) 118 18 98.5 °F (36.9 °C) 99 %      MAP       --         Physical Exam    Nursing note and vitals reviewed.  Constitutional: She appears well-developed and well-nourished. She is not diaphoretic. She is cooperative.  Non-toxic appearance. She does not have a sickly appearance. No distress.   Well-appearing,  female accompanied the emergency room.  Speaking clearly full sentences.  No acute distress.   HENT:   Head: Normocephalic and atraumatic.   Right Ear: External ear normal.   Left Ear: External ear normal.   Nose: Nose normal.    Mouth/Throat: Oropharynx is clear and moist.   Eyes: Conjunctivae and EOM are normal.   Neck: Normal range of motion. Neck supple.   Cardiovascular: Normal rate, regular rhythm and normal heart sounds.   Pulmonary/Chest: Breath sounds normal. No respiratory distress. She has no wheezes.   Abdominal: Soft. Bowel sounds are normal. She exhibits no distension. There is no tenderness. There is no rebound and no guarding.   Soft, nontender abdomen   Genitourinary:   Genitourinary Comments: Deferred to OBGYN   Musculoskeletal: Normal range of motion.   Neurological: She is alert and oriented to person, place, and time.   Skin: Skin is warm.   Psychiatric: She has a normal mood and affect. Her behavior is normal. Judgment and thought content normal.         ED Course   Procedures  Labs Reviewed   COMPREHENSIVE METABOLIC PANEL - Abnormal; Notable for the following components:       Result Value    Potassium 3.4 (*)     CO2 21 (*)     Albumin 3.4 (*)     ALT 7 (*)     All other components within normal limits   CBC W/ AUTO DIFFERENTIAL   URINALYSIS   TYPE & SCREEN           Medical Decision Making:   History:   Old Medical Records: I decided to obtain old medical records.  Old Records Summarized: records from previous admission(s).       <> Summary of Records:     Patient underwent a laparoscopic hysterectomy on 08/27/2019      Initial Assessment:     Urgent evaluation of a 34-year-old female presenting to the emergency department with complaints of vaginal bleeding status post hysterectomy.  Patient is afebrile, nontoxic appearing, hemodynamically stable. Physical exam reveals soft, nontender abdomen.  No rebound or guarding. Labs are ordered.  Will defer vaginal exam to OBGYN.    Clinical Tests:   Lab Tests: Ordered and Reviewed  ED Management:    CBC shows no leukocytosis, normal H&H.  CMP shows potassium of 3.4, normal kidney function.    5:06 PM Spoke with OBGYN in regards to patient, will come down to evaluate      OBGYN has come to see and evaluate the patient.  Do not need any further treatment.  Patient is counseled on home care, given bleeding precautions, urged to follow up with gyn Clinic as scheduled.  Discharged home in stable condition.The patient was instructed to follow up with a primary care provider in 2 days or to return to the emergency department for worsening symptoms. The treatment plan was discussed with the patient who demonstrated understanding and comfort with plan.    This note was created using [employer] Fluency Direct. There may be typographical errors secondary to dictation.                      ED Course as of Sep 19 1837   Thu Sep 19, 2019   1557 Shena Gomez, 34 y.o.  presented to the ED complaining of vaginal bleeding s/p hysterectomy three weeks ago. She has saturated 3 pads in 2 hours. She is passing clots. No dizziness, CP, SOB or weakness.      Patient seen and medically screened by myself in the Provider in Triage Sort system due to ED crowding.  Appropriate tests and/or medications ordered.  A medical screening exam has been performed.  The care will be assumed by myself or another provider when treatment space becomes available.  I am not assuming care of this patient at this time. 3:57 PM. FRANKIE FERNANDEZ        [AM]      ED Course User Index  [AM] Marisel Boggs PA-C     Clinical Impression:     1. Postoperative vaginal bleeding following genitourinary procedure           Disposition:   Disposition: Discharged  Condition: Stable                        Sara Da Silva PA-C  09/19/19 1837

## 2019-09-19 NOTE — TELEPHONE ENCOUNTER
Responded to the patient via the portal. Patient informed per Dr. John recommendations to report to the ED for heavy bleeding as she is describing as we do not have the special equipment in the clinic that the ED has.

## 2019-09-19 NOTE — ED NOTES
Appearance: Pt awake, alert & oriented to person, place & time. Pt in no acute distress at present time. Pt is clean and well groomed with clothes appropriately fastened.   Skin: Skin warm, dry & intact. Color consistent with ethnicity. Mucous membranes moist. No breakdown or brusing noted. Existing healing 3 incisions to abd with no surrounding swelling or redness.   Musculoskeletal: Patient moving all extremities well, no obvious swelling or deformities noted.   Respiratory: Respirations spontaneous, even, and non-labored. Visible chest rise noted. Airway is open and patent. No accessory muscle use noted.   Neurologic: Sensation is intact. Speech is clear and appropriate. Eyes open spontaneously, behavior appropriate to situation, follows commands,  purposeful motor response noted.   Cardiac:  No Bilateral lower extremity edema. Cap refill is <3 seconds. Pt denies active chest pains, SOB, dizziness, blurred vision, weakness or fatigue at this time.   Abdomen: + reports of lower ABD cramping with mild tenderness with palpation by provider, CAMERON Salcedo PA-C. Contour symmetrical. No N/V/D at this time.   : Pt reports no dysuria, or vaginal discharge. + reports of vaginal bleeding.

## 2019-09-19 NOTE — ED NOTES
Pt had hysterectomy at the end of august; reporting new onset vaginal bleeding x today with saturating 2 pads and passing clots per day. Reporting mild lower ABD cramping. Denies nausea, vomiting, or SOB. Pt AAOx4 and appropriate at this time. Respirations even and unlabored. No acute distress noted.

## 2019-09-19 NOTE — TELEPHONE ENCOUNTER
----- Message from Gloria Barker sent at 9/19/2019  1:58 PM CDT -----  Contact: self  Pt Needs a call back at [phone]. She had a hysterectomy and she is bleeding a lot still.

## 2019-09-20 NOTE — HPI
Ms. Gomez is a 34 yr old female who is s/p a TLH on 8/28/19 for symptomatic fibroids and menorrhagia.  The patient's post-operative course has been uncomplicated until roughly a week ago when she started to experience vaginal spotting. The bleeding remained spotting until this afternoon when she noticed heavier bleeding. She states that the increased bleeding started at roughly 1:30. She states that she was at the dentist when she noticed a larger clot.  She also endorses filling a two panty liners since that time. She then contacted her OBGYN and she was told to present to the ED for evaluation. The GYN service was consulted for evaluation.  Of note, the patient admits to taking a bath yesterday.  On presentation the patient denies any complaints apart from the bleeding. She denies fever/chills, N/V, pain, SOB, chest pain or urinary complaints.   
no

## 2019-09-20 NOTE — ASSESSMENT & PLAN NOTE
- Patient presents with complaints of vaginal bleeding s/p TLH  - Pelvic exam reassuring without signs of dehiscence, infection, hemorrhage. Sliver nitrate applied to one area of minimal oozing  - CBC and CMP without acute abnormality  - Tachycardia in the ED, likely secondary to anxiety  - Do not feel further intervention or management is needed at this time  - Stressed the importance of compliance with her post-operative instructions  - Dr. John aware and spoke with the patient directly  - All appropriate pre-cautions given and appropriate follow-up scheduled

## 2019-09-20 NOTE — CONSULTS
[facility]  Obstetrics & Gynecology  Consult Note    Patient Name: Shena Gomez  MRN: [id number]  Admission Date: 2019  Hospital Length of Stay: 0 days  Code Status: Prior  Primary Care Provider: Dixon Anderson MD  Principal Problem: S/P laparoscopic hysterectomy    Consults  Subjective:     Chief Complaint: Vaginal Bleeding S/P [facility] 19    History of Present Illness:  Ms. Gomez is a 34 yr old female who is s/p a TLH on 19 for symptomatic fibroids and menorrhagia.  The patient's post-operative course has been uncomplicated until roughly a week ago when she started to experience vaginal spotting. The bleeding remained spotting until this afternoon when she noticed heavier bleeding. She states that the increased bleeding started at roughly 1:30. She states that she was at the dentist when she noticed a larger clot.  She also endorses filling a two panty liners since that time. She then contacted her OBGYN and she was told to present to the ED for evaluation. The GYN service was consulted for evaluation.  Of note, the patient admits to taking a bath yesterday.  On presentation the patient denies any complaints apart from the bleeding. She denies fever/chills, N/V, pain, SOB, chest pain or urinary complaints.         OB History    Para Term  AB Living   5 4 3 1 1 3   SAB TAB Ectopic Multiple Live Births   1 0 0 0 1      # Outcome Date GA Lbr Reginald/2nd Weight Sex Delivery Anes PTL Lv   5 Term 14 39w1d  3.195 kg (7 lb 0.7 oz) F CS-LTranv Spinal N GUI      Name: Deanne      Apgar1: 9  Apgar5: 9   4  2012 22w0d  0.709 kg (1 lb 9 oz) M       3 Term 09/10/08 38w0d  3.629 kg (8 lb) M CS-LTranv EPI        Name: enciso   2 Term 05 42w0d  3.345 kg (7 lb 6 oz) M CS-LTranv EPI        Name: roger   1 SAB              Past Medical History:   Diagnosis Date    Kidney stones     history     Past Surgical History:   Procedure Laterality Date    cercalage      CERCLAGE,  CERVIX N/A 2013    Performed by [facility] at [facility] L[facility]D     SECTION       SECTION N/A 2014    Performed by Barrera Mart[facility]nez Jr., MD at [facility] L&D    CYSTOSCOPY N/A 2019    Performed by Y[facility]asta John MD at [facility] OR    HYSTERECTOMY, TOTAL, LAPAROSCOPIC N/A 2019    Performed by [facility]asta [facility] at [facility] OR    SALPINGECTOMY, LAPAROSCOPIC Bilateral 2019    Performed by [facility]asta John [facility] at [facility] OR    TUBAL LIGATION  14         (Not in a hospital admission)    Review of patient's allergies indicates:   Allergen Reactions    Celestone [betamethasone] Palpitations        Family History     Problem Relation (Age of Onset)    Breast cancer Maternal Grandmother (68), Maternal Aunt (56)    Esophageal cancer Maternal Aunt        Tobacco Use    Smoking status: Never Smoker    Smokeless tobacco: Never Used   Substance and Sexual Activity    Alcohol use: Never     Alcohol/week: 0.0 oz    Drug use: Never    Sexual activity: Yes     Partners: Male     Birth control/protection: Other-see comments     Comment: Tubal     Review of Systems   Constitutional: Negative for chills and fever.   Eyes: Negative for visual disturbance.   Respiratory: Negative for shortness of breath.    Cardiovascular: Negative for chest pain.   Gastrointestinal: Negative for abdominal pain, diarrhea, nausea and vomiting.   Endocrine: Negative for diabetes.   Genitourinary: Positive for vaginal bleeding. Negative for dysuria, hematuria and vaginal discharge.   Musculoskeletal: Negative for back pain.   Integumentary:  Negative for rash.   Neurological: Negative for headaches.   Hematological: Does not bruise/bleed easily.   Psychiatric/Behavioral: The patient is nervous/anxious.       Objective:     Vital Signs (Most Recent):  Temp: 97.4 °F (36.3 °C) (19 1844)  Pulse: 105 (19 1829)  Resp: 16 (19 1733)  BP: 107/70 (19 1836)  SpO2: 100 %  (09/19/19 6019) Vital Signs (24h Range):  Temp:  [97.4 °F (36.3 °C)-98.5 °F (36.9 °C)] 97.4 °F (36.3 °C)  Pulse:  [105-118] 105  Resp:  [16-18] 16  SpO2:  [99 %-100 %] 100 %  BP: (107-129)/(58-83) 107/70     Weight: 48.1 kg (106 lb)  Body mass index is 20.7 kg/m².    Patient's last menstrual period was 08/26/2019.    Physical Exam:   Constitutional: She is oriented to person, place, and time. She appears well-developed and well-nourished. No distress.   Patient appears anxious but in no acute distress.     HENT:   Head: Normocephalic and atraumatic.    Eyes: Conjunctivae are normal.    Neck: Neck supple.    Cardiovascular: Regular rhythm and intact distal pulses.    Tachycardic to the 110s at bedside.     Pulmonary/Chest: Effort normal. No respiratory distress.        Abdominal: Soft. She exhibits abdominal incision (Trocar sites well healed and without signs of infection or inflammation ). She exhibits no distension. There is no tenderness. There is no rebound and no guarding.     Genitourinary: Vagina normal.       Genitourinary Comments: Roughly 5-10cc of blood evacuated from vagina on initial inspection. Vaginal cuff sutures noted. No visual signs of dehiscence. Cuff appears to be well intact when probed. Non-tender. One small site of minimal oozing noted on the right side of the cervix, treated with silver nitrate. No discharge noted. Normal vaginal mucosa.                 Neurological: She is alert and oriented to person, place, and time.    Skin: Skin is warm and dry. She is not diaphoretic.    Psychiatric: She has a normal mood and affect. Her behavior is normal. Judgment and thought content normal.       Laboratory:  CBC:   Recent Labs   Lab 09/19/19  1606   WBC 7.33   RBC 4.58   HGB 13.1   HCT 39.4      MCV 86   MCH 28.6   MCHC 33.2     CMP:   Recent Labs   Lab 09/19/19  1606   GLU 80   CALCIUM 8.8   ALBUMIN 3.4*   PROT 7.1      K 3.4*   CO2 21*      BUN 11   CREATININE 0.7   ALKPHOS 66    ALT 7*   AST 16   BILITOT 0.3     I have personallly reviewed all pertinent lab results from the last 24 hours.    Assessment/Plan:     * S/P TLH/BS, 8/27/19  - Patient presents with complaints of vaginal bleeding s/p TLH  - Pelvic exam reassuring without signs of dehiscence, infection, hemorrhage. Sliver nitrate applied to one area of minimal oozing  - CBC and CMP without acute abnormality  - Tachycardia in the ED, likely secondary to anxiety  - Do not feel further intervention or management is needed at this time  - Stressed the importance of compliance with her post-operative instructions  - Dr. John aware and spoke with the patient directly  - All appropriate pre-cautions given and appropriate follow-up scheduled      Thank you for your consult. I will sign off. Please contact us if you have any additional questions.    Silvano Farley MD  Obstetrics & Gynecology  [facility]

## 2019-09-20 NOTE — SUBJECTIVE & OBJECTIVE
OB History    Para Term  AB Living   5 4 3 1 1 3   SAB TAB Ectopic Multiple Live Births   1 0 0 0 1      # Outcome Date GA Lbr Reginald/2nd Weight Sex Delivery Anes PTL Lv   5 Term 14 39w1d  3.195 kg (7 lb 0.7 oz) F CS-LTranv Spinal N GUI      Name: Deanne      Apgar1: 9  Apgar5: 9   4  2012 22w0d  0.709 kg (1 lb 9 oz) M       3 Term 09/10/08 38w0d  3.629 kg (8 lb) M CS-LTranv EPI        Name: zaria   2 Term 05 42w0d  3.345 kg (7 lb 6 oz) M CS-LTranv EPI        Name: roger   1 SAB              Past Medical History:   Diagnosis Date    Kidney stones     history     Past Surgical History:   Procedure Laterality Date    cercalage      CERCLAGE, CERVIX N/A 2013    [facility] by [facility] at [facility] L[facility]D     SECTION       SECTION N/A 2014    Performed by Barrera Mart[facility]nez Jr., MD at [facility] L&D    CYSTOSCOPY N/A 2019    Performed by [facility]asta John MD at [facility] OR    HYSTERECTOMY, TOTAL, LAPAROSCOPIC N/A 2019    Performed by [facility] MD at [facility] OR    SALPINGECTOMY, LAPAROSCOPIC Bilateral 2019    Performed by [facility]asta [facility] MD at [facility] OR    TUBAL LIGATION  14         (Not in a hospital admission)    Review of patient's allergies indicates:   Allergen Reactions    Celestone [betamethasone] Palpitations        Family History     Problem Relation (Age of Onset)    Breast cancer Maternal Grandmother (68), Maternal Aunt (56)    Esophageal cancer Maternal Aunt        Tobacco Use    Smoking status: Never Smoker    Smokeless tobacco: Never Used   Substance and Sexual Activity    Alcohol use: Never     Alcohol/week: 0.0 oz    Drug use: Never    Sexual activity: Yes     Partners: Male     Birth control/protection: Other-see comments     Comment: Tubal     Review of Systems   Constitutional: Negative for chills and fever.   Eyes: Negative for visual disturbance.   Respiratory: Negative for shortness of  breath.    Cardiovascular: Negative for chest pain.   Gastrointestinal: Negative for abdominal pain, diarrhea, nausea and vomiting.   Endocrine: Negative for diabetes.   Genitourinary: Positive for vaginal bleeding. Negative for dysuria, hematuria and vaginal discharge.   Musculoskeletal: Negative for back pain.   Integumentary:  Negative for rash.   Neurological: Negative for headaches.   Hematological: Does not bruise/bleed easily.   Psychiatric/Behavioral: The patient is nervous/anxious.       Objective:     Vital Signs (Most Recent):  Temp: 97.4 °F (36.3 °C) (09/19/19 1844)  Pulse: 105 (09/19/19 1829)  Resp: 16 (09/19/19 1733)  BP: 107/70 (09/19/19 1836)  SpO2: 100 % (09/19/19 1829) Vital Signs (24h Range):  Temp:  [97.4 °F (36.3 °C)-98.5 °F (36.9 °C)] 97.4 °F (36.3 °C)  Pulse:  [105-118] 105  Resp:  [16-18] 16  SpO2:  [99 %-100 %] 100 %  BP: (107-129)/(58-83) 107/70     Weight: 48.1 kg (106 lb)  Body mass index is 20.7 kg/m².    Patient's last menstrual period was 08/26/2019.    Physical Exam:   Constitutional: She is oriented to person, place, and time. She appears well-developed and well-nourished. No distress.   Patient appears anxious but in no acute distress.     HENT:   Head: Normocephalic and atraumatic.    Eyes: Conjunctivae are normal.    Neck: Neck supple.    Cardiovascular: Regular rhythm and intact distal pulses.    Tachycardic to the 110s at bedside.     Pulmonary/Chest: Effort normal. No respiratory distress.        Abdominal: Soft. She exhibits abdominal incision (Trocar sites well healed and without signs of infection or inflammation ). She exhibits no distension. There is no tenderness. There is no rebound and no guarding.     Genitourinary: Vagina normal.       Genitourinary Comments: Roughly 5-10cc of blood evacuated from vagina on initial inspection. Vaginal cuff sutures noted. No visual signs of dehiscence. Cuff appears to be well intact when probed. Non-tender. One small site of minimal  oozing noted on the right side of the cervix, treated with silver nitrate. No discharge noted. Normal vaginal mucosa.                 Neurological: She is alert and oriented to person, place, and time.    Skin: Skin is warm and dry. She is not diaphoretic.    Psychiatric: She has a normal mood and affect. Her behavior is normal. Judgment and thought content normal.       Laboratory:  CBC:   Recent Labs   Lab 09/19/19  1606   WBC 7.33   RBC 4.58   HGB 13.1   HCT 39.4      MCV 86   MCH 28.6   MCHC 33.2     CMP:   Recent Labs   Lab 09/19/19  1606   GLU 80   CALCIUM 8.8   ALBUMIN 3.4*   PROT 7.1      K 3.4*   CO2 21*      BUN 11   CREATININE 0.7   ALKPHOS 66   ALT 7*   AST 16   BILITOT 0.3     I have personallly reviewed all pertinent lab results from the last 24 hours.

## 2019-09-21 ENCOUNTER — PATIENT MESSAGE (OUTPATIENT)
Dept: OBSTETRICS AND GYNECOLOGY | Facility: CLINIC | Age: 34
End: 2019-09-21

## 2019-09-23 ENCOUNTER — PATIENT MESSAGE (OUTPATIENT)
Dept: OBSTETRICS AND GYNECOLOGY | Facility: CLINIC | Age: 34
End: 2019-09-23

## 2019-09-23 DIAGNOSIS — B37.31 YEAST VAGINITIS: Primary | ICD-10-CM

## 2019-09-23 RX ORDER — FLUCONAZOLE 150 MG/1
150 TABLET ORAL ONCE
Qty: 1 TABLET | Refills: 0 | Status: SHIPPED | OUTPATIENT
Start: 2019-09-23 | End: 2019-09-23

## 2019-09-24 ENCOUNTER — PATIENT MESSAGE (OUTPATIENT)
Dept: OBSTETRICS AND GYNECOLOGY | Facility: CLINIC | Age: 34
End: 2019-09-24

## 2019-09-24 NOTE — TELEPHONE ENCOUNTER
Please let the patient know this is normal healing and to let us know if the bleeding is more than a pad/hour

## 2019-10-01 ENCOUNTER — PATIENT MESSAGE (OUTPATIENT)
Dept: OBSTETRICS AND GYNECOLOGY | Facility: CLINIC | Age: 34
End: 2019-10-01

## 2019-10-02 ENCOUNTER — PATIENT MESSAGE (OUTPATIENT)
Dept: OBSTETRICS AND GYNECOLOGY | Facility: CLINIC | Age: 34
End: 2019-10-02

## 2019-10-03 ENCOUNTER — PATIENT MESSAGE (OUTPATIENT)
Dept: OBSTETRICS AND GYNECOLOGY | Facility: CLINIC | Age: 34
End: 2019-10-03

## 2019-10-03 ENCOUNTER — OFFICE VISIT (OUTPATIENT)
Dept: OBSTETRICS AND GYNECOLOGY | Facility: CLINIC | Age: 34
End: 2019-10-03
Payer: COMMERCIAL

## 2019-10-03 VITALS
SYSTOLIC BLOOD PRESSURE: 108 MMHG | DIASTOLIC BLOOD PRESSURE: 70 MMHG | HEIGHT: 60 IN | WEIGHT: 101.44 LBS | BODY MASS INDEX: 19.91 KG/M2

## 2019-10-03 DIAGNOSIS — N89.8 VAGINAL GRANULATION TISSUE: ICD-10-CM

## 2019-10-03 DIAGNOSIS — N93.9 VAGINA BLEEDING: ICD-10-CM

## 2019-10-03 DIAGNOSIS — Z90.710 S/P LAPAROSCOPIC HYSTERECTOMY: Primary | ICD-10-CM

## 2019-10-03 LAB
BACTERIAL VAGINOSIS DNA: NEGATIVE
CANDIDA GLABRATA DNA: NEGATIVE
CANDIDA KRUSEI DNA: NEGATIVE
CANDIDA RRNA VAG QL PROBE: NEGATIVE
T VAGINALIS RRNA GENITAL QL PROBE: NEGATIVE

## 2019-10-03 PROCEDURE — 87481 CANDIDA DNA AMP PROBE: CPT | Mod: 59

## 2019-10-03 PROCEDURE — 87801 DETECT AGNT MULT DNA AMPLI: CPT

## 2019-10-03 PROCEDURE — 99024 POSTOP FOLLOW-UP VISIT: CPT | Mod: S$GLB,,, | Performed by: OBSTETRICS & GYNECOLOGY

## 2019-10-03 PROCEDURE — 99999 PR PBB SHADOW E&M-EST. PATIENT-LVL III: CPT | Mod: PBBFAC,,, | Performed by: OBSTETRICS & GYNECOLOGY

## 2019-10-03 PROCEDURE — 99024 PR POST-OP FOLLOW-UP VISIT: ICD-10-PCS | Mod: S$GLB,,, | Performed by: OBSTETRICS & GYNECOLOGY

## 2019-10-03 PROCEDURE — 99999 PR PBB SHADOW E&M-EST. PATIENT-LVL III: ICD-10-PCS | Mod: PBBFAC,,, | Performed by: OBSTETRICS & GYNECOLOGY

## 2019-10-03 RX ORDER — FLUCONAZOLE 150 MG/1
TABLET ORAL
Refills: 0 | COMMUNITY
Start: 2019-09-24 | End: 2019-12-18

## 2019-10-03 RX ORDER — SULFAMETHOXAZOLE AND TRIMETHOPRIM 800; 160 MG/1; MG/1
TABLET ORAL
Refills: 0 | COMMUNITY
Start: 2019-09-23 | End: 2019-12-18

## 2019-10-03 NOTE — PROGRESS NOTES
PT HERE S/P TLH 5 WEEKS AGO WITH BRIGHT RED BLAADING YETSERDAY.  NO VAGINAL INSERTION.LAST BLEEDING WAS 2 WEEKS AFTER SURGERY.  NO PAIN.    ROS:  GENERAL: No fever, chills, fatigability or weight loss.  VULVAR: No pain, no lesions and no itching.  VAGINAL: No relaxation, no itching, no discharge, no abnormal bleeding and no lesions.  ABDOMEN: No abdominal pain. Denies nausea. Denies vomiting. No diarrhea. No constipation  BREAST: Denies pain. No lumps. No discharge.  URINARY: No incontinence, no nocturia, no frequency and no dysuria.  CARDIOVASCULAR: No chest pain. No shortness of breath. No leg cramps.  NEUROLOGICAL: No headaches. No vision changes.  The remainder of the review of systems was negative.    PE:   General Appearance: normal weight Well developed. Well nourished. In no acute distress.  Urethral Meatus: Normal size. Normal location. No lesions. No prolapse.  Vulva: Atrophic. Lesions: No.  Urethra: No masses. No tenderness. No prolapse. No scarring.  Bladder: No masses. No tenderness.  Vagina: Mucosa NI: yes Discharge: no Atrophic: no Rectocele: no Cystocele: no Vaginal cuff intact: yes    BLACK BLOOD SCANT. R CUFF ANGLE WITH MIN GRANULATION SILVER NITRATE APPLIED. OTHERWISE HEALED WELL EPITHELIALIZATION.  Cervix: Absent.  Uterus: Absent.  Adnexa: Masses: No Tenderness: No       CDS Nodularity: No   Abdomen: normal weight  No masses. No tenderness.  HEALED INCISIONS.    PROCEDURES:    DIAGNOSIS:  1. S/P TLH/BS, 8/27/19    2. Vaginal granulation tissue    3. Vagina bleeding        PLAN:     MEDICATIONS & ORDERS:  Orders Placed This Encounter    Vaginosis Screen by DNA Probe         FOLLOW-UP: With VG ON Monday  PELVIC REST

## 2019-10-07 ENCOUNTER — OFFICE VISIT (OUTPATIENT)
Dept: OBSTETRICS AND GYNECOLOGY | Facility: CLINIC | Age: 34
End: 2019-10-07
Attending: OBSTETRICS & GYNECOLOGY
Payer: COMMERCIAL

## 2019-10-07 VITALS
HEIGHT: 60 IN | WEIGHT: 104.06 LBS | SYSTOLIC BLOOD PRESSURE: 96 MMHG | BODY MASS INDEX: 20.43 KG/M2 | DIASTOLIC BLOOD PRESSURE: 58 MMHG

## 2019-10-07 DIAGNOSIS — Z09 POSTOP CHECK: Primary | ICD-10-CM

## 2019-10-07 PROBLEM — Z90.710 S/P LAPAROSCOPIC HYSTERECTOMY: Status: RESOLVED | Noted: 2019-08-27 | Resolved: 2019-10-07

## 2019-10-07 PROCEDURE — 99999 PR PBB SHADOW E&M-EST. PATIENT-LVL III: ICD-10-PCS | Mod: PBBFAC,,, | Performed by: OBSTETRICS & GYNECOLOGY

## 2019-10-07 PROCEDURE — 99999 PR PBB SHADOW E&M-EST. PATIENT-LVL III: CPT | Mod: PBBFAC,,, | Performed by: OBSTETRICS & GYNECOLOGY

## 2019-10-07 PROCEDURE — 99024 PR POST-OP FOLLOW-UP VISIT: ICD-10-PCS | Mod: S$GLB,,, | Performed by: OBSTETRICS & GYNECOLOGY

## 2019-10-07 PROCEDURE — 99024 POSTOP FOLLOW-UP VISIT: CPT | Mod: S$GLB,,, | Performed by: OBSTETRICS & GYNECOLOGY

## 2019-10-07 NOTE — PROGRESS NOTES
CC: Postoperative visit    Shena Gomez is a 34 y.o. female  presents for a postoperative visit s/p TLH, BS,cystoscopy on .  Her postoperative course was uncomplicated.  She is doing well postoperative.    She was seen in the ED 2 weeks after surgery with complaint of bleeding.  A small amount of granulation tissue was seen and cauterized.  At that time, she revealed she had been very active (still doing normal activities) since the day after surgery.  Discussed appropriate rest.    She was seen by Dr. Martínez with complaint of spotting on 10/3.  Small amount of granulation tissue was seen.    Pathology showed:  FINAL PATHOLOGIC DIAGNOSIS  UTERUS, CERVIX AND BILATERAL TUBES, HYSTERECTOMY AND SALPINGECTOMY:  -Adenomyosis.  -Benign secretory endometrium with breakdown.  -Cervix with nabothian cysts.  -Benign fallopian tubes paratubal cysts.  -Negative for atypia or malignancy.    BP (!) 96/58   Ht 5' (1.524 m)   Wt 47.2 kg (104 lb 0.9 oz)   LMP 2019   BMI 20.32 kg/m²     ROS:  GENERAL: No fever, chills, fatigability or weight loss.  VULVAR: No pain, no lesions and no itching.  VAGINAL: No relaxation, no itching, no discharge, no abnormal bleeding and no lesions.  ABDOMEN: No abdominal pain. Denies nausea. Denies vomiting. No diarrhea. No constipation  BREAST: Denies pain. No lumps. No discharge.  URINARY: No incontinence, no nocturia, no frequency and no dysuria.  CARDIOVASCULAR: No chest pain. No shortness of breath. No leg cramps.  NEUROLOGICAL: No headaches. No vision changes.    Physical Exam      PELVIC: Normal external genitalia without lesions.  Normal hair distribution.  Adequate perineal body, normal urethral meatus.  Vagina moist and well rugated without lesions or discharge.  Vaginal cuff well healed.  No granulation tissue.  Small amount of brown discharge present.  No significant cystocele or rectocele.  Bimanual exam shows uterus to be surgcially absent.  Adnexa without masses  or tenderness.      1. Postop check         Patient can return to normal activities but delay intercourse for 3 additional weeks.  Return to clinic in 1 year for well woman exam.

## 2019-10-13 ENCOUNTER — PATIENT MESSAGE (OUTPATIENT)
Dept: PRIMARY CARE CLINIC | Facility: CLINIC | Age: 34
End: 2019-10-13

## 2019-10-16 ENCOUNTER — PATIENT MESSAGE (OUTPATIENT)
Dept: OBSTETRICS AND GYNECOLOGY | Facility: CLINIC | Age: 34
End: 2019-10-16

## 2019-10-28 ENCOUNTER — PATIENT MESSAGE (OUTPATIENT)
Dept: PRIMARY CARE CLINIC | Facility: CLINIC | Age: 34
End: 2019-10-28

## 2019-11-19 ENCOUNTER — PATIENT MESSAGE (OUTPATIENT)
Dept: OBSTETRICS AND GYNECOLOGY | Facility: CLINIC | Age: 34
End: 2019-11-19

## 2019-11-20 ENCOUNTER — PATIENT MESSAGE (OUTPATIENT)
Dept: OBSTETRICS AND GYNECOLOGY | Facility: CLINIC | Age: 34
End: 2019-11-20

## 2019-11-20 DIAGNOSIS — N94.3 PMS (PREMENSTRUAL SYNDROME): Primary | ICD-10-CM

## 2019-11-21 RX ORDER — FLUOXETINE HYDROCHLORIDE 20 MG/1
20 CAPSULE ORAL DAILY
Qty: 30 CAPSULE | Refills: 11 | Status: SHIPPED | OUTPATIENT
Start: 2019-11-21 | End: 2019-12-18

## 2019-11-22 ENCOUNTER — PATIENT MESSAGE (OUTPATIENT)
Dept: OBSTETRICS AND GYNECOLOGY | Facility: CLINIC | Age: 34
End: 2019-11-22

## 2019-12-05 ENCOUNTER — PATIENT MESSAGE (OUTPATIENT)
Dept: PRIMARY CARE CLINIC | Facility: CLINIC | Age: 34
End: 2019-12-05

## 2019-12-06 ENCOUNTER — TELEPHONE (OUTPATIENT)
Dept: PRIMARY CARE CLINIC | Facility: CLINIC | Age: 34
End: 2019-12-06

## 2019-12-06 ENCOUNTER — TELEPHONE (OUTPATIENT)
Dept: SURGERY | Facility: CLINIC | Age: 34
End: 2019-12-06

## 2019-12-06 NOTE — TELEPHONE ENCOUNTER
----- Message from Zuri Valerio sent at 12/6/2019 10:12 AM CST -----  Contact: self  Patient need to speak to nurse regarding patient has procedure for endoscopy to be done in [facility] by another doctor , patient states she will like to have Dr. Gonzales do procedure instead , procedure is scheduled for 12/20 per patient       Please call to advice [phone] (home)     Patient states she need appt this month if possible due to deductible

## 2019-12-06 NOTE — TELEPHONE ENCOUNTER
----- Message from Tanesha Hwang sent at 12/6/2019  9:27 AM CST -----  Contact: Patient  Type:  Patient Requesting Referral    Who Called:  Shena, patient  Does the patient already have the specialty appointment scheduled?:  Yes  If yes, what is the date of that appointment?:  12/11/2019  Referral to What Specialty:  Gastroenterology  Reason for Referral:  Indigestion, EGD  Does the patient want the referral with a specific physician?:  Dr Praveen Streeter  Is the specialist an [facility]?:  Yes  Patient Requesting a Call Back?:  Yes  Best Call Back Number:  [phone]  Additional Information:   Please call her. Thanks.

## 2019-12-09 NOTE — TELEPHONE ENCOUNTER
Patient would like to know if you can order a EGD so she can have it done asap. Dr. Morton can do it next week if there is an order placed in epic.

## 2019-12-13 ENCOUNTER — TELEPHONE (OUTPATIENT)
Dept: SURGERY | Facility: CLINIC | Age: 34
End: 2019-12-13

## 2019-12-13 ENCOUNTER — PATIENT MESSAGE (OUTPATIENT)
Dept: PRIMARY CARE CLINIC | Facility: CLINIC | Age: 34
End: 2019-12-13

## 2019-12-13 ENCOUNTER — TELEPHONE (OUTPATIENT)
Dept: PRIMARY CARE CLINIC | Facility: CLINIC | Age: 34
End: 2019-12-13

## 2019-12-13 DIAGNOSIS — Z12.11 SPECIAL SCREENING FOR MALIGNANT NEOPLASMS, COLON: Primary | ICD-10-CM

## 2019-12-13 NOTE — TELEPHONE ENCOUNTER
----- Message from Berhane Hatch sent at 12/13/2019  8:31 AM CST -----  Contact: self  Pt states that she received a message from Jefry to contact the office Pt ask for a return call      Contact info  [phone]

## 2019-12-13 NOTE — TELEPHONE ENCOUNTER
Spoke with patient about scheduling an EGD with Dr Cooney. This matter was discussed with the provider and a date of 12/23/19 was to be considered. Patient agreed to that date. She was made aware that she could be dropped off to have the procedure and picked up after it was completed, she would have to be NPO after midnight prior to the procedure, and she would be contacted prior to the procedure with her time to arrive. Patient verbalized understanding of the information provided to her. No further issues discussed.

## 2019-12-13 NOTE — TELEPHONE ENCOUNTER
----- Message from Paty Pina sent at 12/12/2019  3:48 PM CST -----  Contact: pt: [phone]  Pt returning call to clinic     Please contact pt: [phone]

## 2019-12-16 ENCOUNTER — PATIENT MESSAGE (OUTPATIENT)
Dept: OBSTETRICS AND GYNECOLOGY | Facility: CLINIC | Age: 34
End: 2019-12-16

## 2019-12-16 DIAGNOSIS — R10.13 EPIGASTRIC PAIN: Primary | ICD-10-CM

## 2019-12-16 DIAGNOSIS — R10.9 ABDOMINAL PAIN: ICD-10-CM

## 2019-12-23 PROBLEM — R10.13 EPIGASTRIC ABDOMINAL PAIN: Status: ACTIVE | Noted: 2019-12-23

## 2019-12-23 PROBLEM — R10.9 ABDOMINAL PAIN: Status: ACTIVE | Noted: 2019-12-23

## 2019-12-27 ENCOUNTER — TELEPHONE (OUTPATIENT)
Dept: SURGERY | Facility: CLINIC | Age: 34
End: 2019-12-27

## 2019-12-27 NOTE — TELEPHONE ENCOUNTER
----- Message from Berhane Hatch sent at 12/27/2019 11:58 AM CST -----  Contact: self  Pt ask for a call have a few more questions    Contact info  [phone]

## 2019-12-30 ENCOUNTER — TELEPHONE (OUTPATIENT)
Dept: SURGERY | Facility: CLINIC | Age: 34
End: 2019-12-30

## 2019-12-30 NOTE — TELEPHONE ENCOUNTER
Spoke with the patient about her EGD result. Per Dr Cooney the hiatal hernia is very small at 1cm and would be best left alone unless it became necessary to surgically intervene. She should continue to follow up with her PCP in the future. Patient verbalized understanding of the information provided to her. No further issues discussed.

## 2019-12-30 NOTE — TELEPHONE ENCOUNTER
----- Message from Hilary Finch sent at 12/30/2019  9:59 AM CST -----  calling to speak w/Jefry rowley/Dr. Cooney re: next steps in treatment plan    Pt contact [phone]

## 2019-12-31 ENCOUNTER — PATIENT MESSAGE (OUTPATIENT)
Dept: SURGERY | Facility: CLINIC | Age: 34
End: 2019-12-31

## 2020-01-03 ENCOUNTER — OFFICE VISIT (OUTPATIENT)
Dept: PRIMARY CARE CLINIC | Facility: CLINIC | Age: 35
End: 2020-01-03
Payer: COMMERCIAL

## 2020-01-03 VITALS
HEIGHT: 60 IN | BODY MASS INDEX: 20.03 KG/M2 | HEART RATE: 97 BPM | SYSTOLIC BLOOD PRESSURE: 100 MMHG | OXYGEN SATURATION: 98 % | WEIGHT: 102 LBS | DIASTOLIC BLOOD PRESSURE: 66 MMHG | TEMPERATURE: 98 F | RESPIRATION RATE: 18 BRPM

## 2020-01-03 DIAGNOSIS — J32.9 SINUSITIS, UNSPECIFIED CHRONICITY, UNSPECIFIED LOCATION: ICD-10-CM

## 2020-01-03 DIAGNOSIS — Z13.6 ENCOUNTER FOR SCREENING FOR CARDIOVASCULAR DISORDERS: ICD-10-CM

## 2020-01-03 DIAGNOSIS — Z00.00 ROUTINE PHYSICAL EXAMINATION: ICD-10-CM

## 2020-01-03 DIAGNOSIS — F41.1 GAD (GENERALIZED ANXIETY DISORDER): Primary | ICD-10-CM

## 2020-01-03 PROCEDURE — 99214 OFFICE O/P EST MOD 30 MIN: CPT | Mod: S$GLB,,, | Performed by: NURSE PRACTITIONER

## 2020-01-03 PROCEDURE — 3008F PR BODY MASS INDEX (BMI) DOCUMENTED: ICD-10-PCS | Mod: CPTII,S$GLB,, | Performed by: NURSE PRACTITIONER

## 2020-01-03 PROCEDURE — 99999 PR PBB SHADOW E&M-EST. PATIENT-LVL IV: ICD-10-PCS | Mod: PBBFAC,,, | Performed by: NURSE PRACTITIONER

## 2020-01-03 PROCEDURE — 99999 PR PBB SHADOW E&M-EST. PATIENT-LVL IV: CPT | Mod: PBBFAC,,, | Performed by: NURSE PRACTITIONER

## 2020-01-03 PROCEDURE — 99214 PR OFFICE/OUTPT VISIT, EST, LEVL IV, 30-39 MIN: ICD-10-PCS | Mod: S$GLB,,, | Performed by: NURSE PRACTITIONER

## 2020-01-03 PROCEDURE — 3008F BODY MASS INDEX DOCD: CPT | Mod: CPTII,S$GLB,, | Performed by: NURSE PRACTITIONER

## 2020-01-03 RX ORDER — SULFAMETHOXAZOLE AND TRIMETHOPRIM 800; 160 MG/1; MG/1
1 TABLET ORAL 2 TIMES DAILY
Qty: 14 TABLET | Refills: 0 | Status: SHIPPED | OUTPATIENT
Start: 2020-01-03 | End: 2020-01-10

## 2020-01-03 RX ORDER — FLUOXETINE 10 MG/1
10 CAPSULE ORAL DAILY
Qty: 30 CAPSULE | Refills: 11 | Status: SHIPPED | OUTPATIENT
Start: 2020-01-03 | End: 2020-10-14

## 2020-01-03 NOTE — PROGRESS NOTES
"Chief Complaint  Chief Complaint   Patient presents with    Results     EGD     Sinusitis       HPI    Shena Gomez is a 34 y.o. female that presents for right sided facial pressure, anxiety, EGD results.    Sinusitis - Right side facial pressure onset approximately 2 weeks ago. Accompanied by severe headache and cough. Cough described as a "drip" and is non-productive with no associated wheezing or shortness of breath.  No fever or chills. Patient with h/o sinusitis previously treated by ENT with Bactrim requesting to be retreated at this time.    Anxiety - patient with h/o anxiety presents to clinic reporting worsening anxiety over the last several weeks. Reports excessive worry regarding somatic complaints, eg. A bruise on her left chest. Reports ruminating thoughts and worry regarding health issues. Was recently started on prozac 10 mg then subsequently increased to 20 mg. Stopped because she was unable to tolerate the higher dose. Questioning whether or not she should restart. No dysphoria. No anhedonia or withdrawal. Increased stressors in caring for her children.          PAST MEDICAL HISTORY:  Past Medical History:   Diagnosis Date    Anxiety     Chronic sinusitis     Epigastric pain     Kidney stones     history    Nephrolithiasis     OAB (overactive bladder)        PAST SURGICAL HISTORY:  Past Surgical History:   Procedure Laterality Date    CERVICAL CERCLAGE  2013     SECTION      CYSTOSCOPY N/A 2019    Procedure: CYSTOSCOPY;  Surgeon: Yokasta John MD;  Location: [facility];  Service: OB/GYN;  Laterality: N/A;    ESOPHAGOGASTRODUODENOSCOPY N/A 2019    Procedure: EGD (ESOPHAGOGASTRODUODENOSCOPY);  Surgeon: Ferdinand Cooney MD;  [facility];  Service: General;  Laterality: N/A;    LAPAROSCOPIC SALPINGECTOMY Bilateral 2019    Procedure: SALPINGECTOMY, LAPAROSCOPIC;  Surgeon: Yokasta John MD;  Location: [facility];  Service: OB/GYN;  " Laterality: Bilateral;    LAPAROSCOPIC TOTAL HYSTERECTOMY N/A 2019    Procedure: HYSTERECTOMY, TOTAL, LAPAROSCOPIC;  Surgeon: Yokasta John MD;  Location: [facility];  Service: OB/GYN;  Laterality: N/A;    TUBAL LIGATION  14       SOCIAL HISTORY:  Social History     Socioeconomic History    Marital status:      Spouse name: Not on file    Number of children: Not on file    Years of education: Not on file    Highest education level: Not on file   Occupational History    Not on file   Social Needs    Financial resource strain: Not on file    Food insecurity:     Worry: Not on file     Inability: Not on file    Transportation needs:     Medical: Not on file     Non-medical: Not on file   Tobacco Use    Smoking status: Never Smoker    Smokeless tobacco: Never Used   Substance and Sexual Activity    Alcohol use: Never     Alcohol/week: 0.0 standard drinks    Drug use: Never    Sexual activity: Not Currently     Partners: Male     Birth control/protection: Other-see comments     Comment: Tubal   Lifestyle    Physical activity:     Days per week: Not on file     Minutes per session: Not on file    Stress: Not on file   Relationships    Social connections:     Talks on phone: Not on file     Gets together: Not on file     Attends Mandaeism service: Not on file     Active member of club or organization: Not on file     Attends meetings of clubs or organizations: Not on file     Relationship status: Not on file   Other Topics Concern    Not on file   Social History Narrative    Not on file       FAMILY HISTORY:  Family History   Problem Relation Age of Onset    Breast cancer Maternal Grandmother 68    Breast cancer Maternal Aunt 56    Esophageal cancer Maternal Aunt     Cancer Neg Hx     Colon cancer Neg Hx     Diabetes Neg Hx     Eclampsia Neg Hx     Miscarriages / Stillbirths Neg Hx     Hypertension Neg Hx     Ovarian cancer Neg Hx      labor Neg Hx     Stroke  Neg Hx        ALLERGIES AND MEDICATIONS: updated and reviewed.  Review of patient's allergies indicates:   Allergen Reactions    Celestone [betamethasone] Palpitations     Current Outpatient Medications   Medication Sig Dispense Refill    FLUoxetine 10 MG capsule Take 1 capsule (10 mg total) by mouth once daily. 30 capsule 11    sulfamethoxazole-trimethoprim 800-160mg (BACTRIM DS) 800-160 mg Tab Take 1 tablet by mouth 2 (two) times daily. for 7 days 14 tablet 0     No current facility-administered medications for this visit.          ROS  Review of Systems   Constitutional: Positive for unexpected weight change. Negative for activity change.   HENT: Negative for hearing loss, rhinorrhea and trouble swallowing.    Eyes: Negative for discharge and visual disturbance.   Respiratory: Negative for chest tightness and wheezing.    Cardiovascular: Negative for chest pain and palpitations.   Gastrointestinal: Negative for blood in stool, constipation, diarrhea and vomiting.   Endocrine: Negative for polydipsia and polyuria.   Genitourinary: Negative for difficulty urinating, dysuria, hematuria and menstrual problem.   Musculoskeletal: Positive for neck pain. Negative for arthralgias and joint swelling.   Neurological: Positive for headaches. Negative for weakness.   Psychiatric/Behavioral: Negative for confusion and dysphoric mood. The patient is nervous/anxious.            PHYSICAL EXAM  Vitals:    01/03/20 1450   BP: 100/66   BP Location: Right arm   Patient Position: Sitting   BP Method: Medium (Manual)   Pulse: 97   Resp: 18   Temp: 98 °F (36.7 °C)   TempSrc: Oral   SpO2: 98%   Weight: 46.3 kg (102 lb)   Height: 5' (1.524 m)    Body mass index is 19.92 kg/m².  Weight: 46.3 kg (102 lb)   Height: 5' (152.4 cm)     Physical Exam   Constitutional: She is oriented to person, place, and time. She appears well-developed and well-nourished.   HENT:   Head: Normocephalic.   Right Ear: Tympanic membrane normal.   Left Ear:  Tympanic membrane normal.   Mouth/Throat: Uvula is midline, oropharynx is clear and moist and mucous membranes are normal.   Eyes: Conjunctivae are normal.   Cardiovascular: Normal rate, regular rhythm, normal heart sounds and normal pulses.   No murmur heard.  Pulses:       Radial pulses are 2+ on the right side, and 2+ on the left side.   No LE swelling noted   Pulmonary/Chest: Effort normal and breath sounds normal. She has no wheezes.   Abdominal: Soft. Bowel sounds are normal. There is no tenderness.   Musculoskeletal: She exhibits no edema.   Lymphadenopathy:     She has no cervical adenopathy.   Neurological: She is alert and oriented to person, place, and time.   Skin: Skin is warm and dry. No rash noted.   Psychiatric: Her mood appears anxious.         Health Maintenance       Date Due Completion Date    TETANUS VACCINE 08/06/2004 ---            Assessment & Plan    Problem List Items Addressed This Visit     None      Visit Diagnoses     JENNIFER (generalized anxiety disorder)    -  Primary    Relevant Medications    FLUoxetine 10 MG capsule    Other Relevant Orders    TSH    Sinusitis, unspecified chronicity, unspecified location        Relevant Medications    sulfamethoxazole-trimethoprim 800-160mg (BACTRIM DS) 800-160 mg Tab    Encounter for screening for cardiovascular disorders        Relevant Orders    Lipid panel    Routine physical examination        Relevant Orders    CBC auto differential    Comprehensive metabolic panel          Follow-up: Follow up in about 3 months (around 4/3/2020).    Marisel Mcdaniels    Medication List with Changes/Refills   New Medications    FLUOXETINE 10 MG CAPSULE    Take 1 capsule (10 mg total) by mouth once daily.    SULFAMETHOXAZOLE-TRIMETHOPRIM 800-160MG (BACTRIM DS) 800-160 MG TAB    Take 1 tablet by mouth 2 (two) times daily. for 7 days   Discontinued Medications    PSEUDOEPHEDRINE (SUDAFED) 30 MG TABLET    Take 30 mg by mouth every 4 (four) hours as needed for  Congestion.

## 2020-01-06 ENCOUNTER — TELEPHONE (OUTPATIENT)
Dept: SURGERY | Facility: CLINIC | Age: 35
End: 2020-01-06

## 2020-01-29 ENCOUNTER — PATIENT MESSAGE (OUTPATIENT)
Dept: OBSTETRICS AND GYNECOLOGY | Facility: CLINIC | Age: 35
End: 2020-01-29

## 2020-02-21 ENCOUNTER — PATIENT OUTREACH (OUTPATIENT)
Dept: ADMINISTRATIVE | Facility: HOSPITAL | Age: 35
End: 2020-02-21

## 2020-03-06 ENCOUNTER — CLINICAL SUPPORT (OUTPATIENT)
Dept: PRIMARY CARE CLINIC | Facility: CLINIC | Age: 35
End: 2020-03-06
Payer: COMMERCIAL

## 2020-03-06 DIAGNOSIS — Z13.6 ENCOUNTER FOR SCREENING FOR CARDIOVASCULAR DISORDERS: ICD-10-CM

## 2020-03-06 DIAGNOSIS — F41.1 GAD (GENERALIZED ANXIETY DISORDER): ICD-10-CM

## 2020-03-06 DIAGNOSIS — Z00.00 ROUTINE PHYSICAL EXAMINATION: ICD-10-CM

## 2020-03-06 LAB
ALBUMIN SERPL BCP-MCNC: 3.3 G/DL (ref 3.5–5.2)
ALP SERPL-CCNC: 55 U/L (ref 38–126)
ALT SERPL W/O P-5'-P-CCNC: 10 U/L (ref 14–54)
ANION GAP SERPL CALC-SCNC: 9 MMOL/L (ref 8–16)
AST SERPL-CCNC: 19 U/L (ref 15–41)
BASOPHILS # BLD AUTO: 0 K/UL (ref 0–0.2)
BASOPHILS NFR BLD: 0.7 % (ref 0–1.9)
BILIRUB SERPL-MCNC: 0.7 MG/DL (ref 0.3–1.2)
BUN SERPL-MCNC: 12 MG/DL (ref 6–20)
CALCIUM SERPL-MCNC: 8.1 MG/DL (ref 8.6–10)
CHLORIDE SERPL-SCNC: 104 MMOL/L (ref 101–111)
CHOLEST SERPL-MCNC: 193 MG/DL (ref 80–200)
CHOLEST/HDLC SERPL: 2.8 {RATIO} (ref 2–5)
CO2 SERPL-SCNC: 25 MMOL/L (ref 23–29)
CREAT SERPL-MCNC: 0.6 MG/DL (ref 0.5–1.4)
DIFFERENTIAL METHOD: ABNORMAL
EOSINOPHIL # BLD AUTO: 0.1 K/UL (ref 0–0.5)
EOSINOPHIL NFR BLD: 1.5 % (ref 0–8)
ERYTHROCYTE [DISTWIDTH] IN BLOOD BY AUTOMATED COUNT: 13.3 % (ref 11.5–14.5)
EST. GFR  (AFRICAN AMERICAN): >60 ML/MIN/1.73 M^2
EST. GFR  (NON AFRICAN AMERICAN): >60 ML/MIN/1.73 M^2
GLUCOSE SERPL-MCNC: 73 MG/DL (ref 74–118)
HCT VFR BLD AUTO: 39.1 % (ref 37–48.5)
HDLC SERPL-MCNC: 68 MG/DL (ref 40–75)
HDLC SERPL: 35.2 % (ref 20–50)
HGB BLD-MCNC: 13.3 G/DL (ref 12–16)
LDLC SERPL CALC-MCNC: 114 MG/DL
LYMPHOCYTES # BLD AUTO: 1.7 K/UL (ref 1–4.8)
LYMPHOCYTES NFR BLD: 29.9 % (ref 18–48)
MCH RBC QN AUTO: 31.4 PG (ref 27–31)
MCHC RBC AUTO-ENTMCNC: 33.9 G/DL (ref 32–36)
MCV RBC AUTO: 93 FL (ref 82–98)
MONOCYTES # BLD AUTO: 0.3 K/UL (ref 0.3–1)
MONOCYTES NFR BLD: 5.4 % (ref 4–15)
NEUTROPHILS # BLD AUTO: 3.5 K/UL (ref 1.8–7.7)
NEUTROPHILS NFR BLD: 62.5 % (ref 38–73)
NONHDLC SERPL-MCNC: 125 MG/DL
PLATELET # BLD AUTO: 237 K/UL (ref 150–350)
PMV BLD AUTO: 7.8 FL (ref 9.2–12.9)
POTASSIUM SERPL-SCNC: 4 MMOL/L (ref 3.5–5.1)
PROT SERPL-MCNC: 6.6 G/DL (ref 6–8.4)
RBC # BLD AUTO: 4.21 M/UL (ref 4–5.4)
SODIUM SERPL-SCNC: 138 MMOL/L (ref 136–145)
TRIGL SERPL-MCNC: 57 MG/DL (ref 30–150)
TSH SERPL DL<=0.005 MIU/L-ACNC: 1.33 UIU/ML (ref 0.45–5.33)
WBC # BLD AUTO: 5.5 K/UL (ref 3.9–12.7)

## 2020-03-06 PROCEDURE — 80061 LIPID PANEL: CPT

## 2020-03-06 PROCEDURE — 85025 COMPLETE CBC W/AUTO DIFF WBC: CPT

## 2020-03-06 PROCEDURE — 84443 ASSAY THYROID STIM HORMONE: CPT

## 2020-03-06 PROCEDURE — 80053 COMPREHEN METABOLIC PANEL: CPT

## 2020-03-10 ENCOUNTER — PATIENT MESSAGE (OUTPATIENT)
Dept: PRIMARY CARE CLINIC | Facility: CLINIC | Age: 35
End: 2020-03-10

## 2020-03-21 ENCOUNTER — PATIENT MESSAGE (OUTPATIENT)
Dept: OBSTETRICS AND GYNECOLOGY | Facility: CLINIC | Age: 35
End: 2020-03-21

## 2020-03-23 ENCOUNTER — TELEPHONE (OUTPATIENT)
Dept: OBSTETRICS AND GYNECOLOGY | Facility: CLINIC | Age: 35
End: 2020-03-23

## 2020-03-23 DIAGNOSIS — N39.9 URINARY PROBLEM IN FEMALE: Primary | ICD-10-CM

## 2020-03-31 ENCOUNTER — PATIENT MESSAGE (OUTPATIENT)
Dept: PRIMARY CARE CLINIC | Facility: CLINIC | Age: 35
End: 2020-03-31

## 2020-04-02 ENCOUNTER — PATIENT MESSAGE (OUTPATIENT)
Dept: PRIMARY CARE CLINIC | Facility: CLINIC | Age: 35
End: 2020-04-02

## 2020-04-20 ENCOUNTER — PATIENT MESSAGE (OUTPATIENT)
Dept: OBSTETRICS AND GYNECOLOGY | Facility: CLINIC | Age: 35
End: 2020-04-20

## 2020-05-11 ENCOUNTER — PATIENT MESSAGE (OUTPATIENT)
Dept: PRIMARY CARE CLINIC | Facility: CLINIC | Age: 35
End: 2020-05-11

## 2020-05-11 ENCOUNTER — PATIENT MESSAGE (OUTPATIENT)
Dept: OBSTETRICS AND GYNECOLOGY | Facility: CLINIC | Age: 35
End: 2020-05-11

## 2020-05-17 ENCOUNTER — PATIENT MESSAGE (OUTPATIENT)
Dept: PRIMARY CARE CLINIC | Facility: CLINIC | Age: 35
End: 2020-05-17

## 2020-05-18 ENCOUNTER — OFFICE VISIT (OUTPATIENT)
Dept: PRIMARY CARE CLINIC | Facility: CLINIC | Age: 35
End: 2020-05-18
Payer: COMMERCIAL

## 2020-05-18 ENCOUNTER — PATIENT MESSAGE (OUTPATIENT)
Dept: PRIMARY CARE CLINIC | Facility: CLINIC | Age: 35
End: 2020-05-18

## 2020-05-18 DIAGNOSIS — M79.674 PAIN OF RIGHT GREAT TOE: Primary | ICD-10-CM

## 2020-05-18 DIAGNOSIS — Z20.822 CLOSE EXPOSURE TO COVID-19 VIRUS: ICD-10-CM

## 2020-05-18 DIAGNOSIS — S92.424A NONDISPLACED FRACTURE OF DISTAL PHALANX OF RIGHT GREAT TOE, INITIAL ENCOUNTER FOR CLOSED FRACTURE: Primary | ICD-10-CM

## 2020-05-18 DIAGNOSIS — T14.90XA TRAUMA: ICD-10-CM

## 2020-05-18 DIAGNOSIS — S16.1XXD STRAIN OF NECK MUSCLE, SUBSEQUENT ENCOUNTER: ICD-10-CM

## 2020-05-18 PROCEDURE — 99214 PR OFFICE/OUTPT VISIT, EST, LEVL IV, 30-39 MIN: ICD-10-PCS | Mod: 95,,, | Performed by: NURSE PRACTITIONER

## 2020-05-18 PROCEDURE — 99214 OFFICE O/P EST MOD 30 MIN: CPT | Mod: 95,,, | Performed by: NURSE PRACTITIONER

## 2020-05-18 RX ORDER — CYCLOBENZAPRINE HCL 5 MG
5 TABLET ORAL 3 TIMES DAILY PRN
Qty: 28 TABLET | Refills: 0 | Status: SHIPPED | OUTPATIENT
Start: 2020-05-18 | End: 2020-05-28

## 2020-05-18 NOTE — PROGRESS NOTES
Chief Complaint  Chief Complaint   Patient presents with    Foot Pain       The patient location is: home  The chief complaint leading to consultation is: foot pain  Visit type: Virtual visit with synchronous audio and video  Total time spent with patient: 10  Each patient to whom he or she provides medical services by telemedicine is:  (1) informed of the relationship between the physician and patient and the respective role of any other health care provider with respect to management of the patient; and (2) notified that he or she may decline to receive medical services by telemedicine and may withdraw from such care at any time.    Notes:    HPI    Shena Gomez is a 34 y.o. female that presents for right foot pain.    Foot trauma - Patient states that she was pulling on a drawer several days ago and the drawer fell immediately on her right great toe. Thinks that it hit her on the tip of her toenail. Toenail is currently black. Noted immediate swelling which has since improved. Swelling has gotten better. Pain continues as 7/10, intermittent. Pain worse with walking.  Has been taking motrin with minimal relief. No bruising under the toe. Most of the bruising is present in the toenail area.     Left sided neck pain - reports the onset of left sided headache temporally radiating posterior occipital area to left neck. Occurring every few days. Unable to describe characteristics of headache. No nausea or vomiting associated with headache. Light sensitivity. Has recently gotten her vision checked with no visual disturbances. Also feels numbness and tingling, pain in her left arm. Has been prescribed flexeril in the past but did not ever try.       PAST MEDICAL HISTORY:  Past Medical History:   Diagnosis Date    Anxiety     Chronic sinusitis     Epigastric pain     Kidney stones     history    Nephrolithiasis     OAB (overactive bladder)        PAST SURGICAL HISTORY:  Past Surgical History:   Procedure  Laterality Date    CERVICAL CERCLAGE  2013     SECTION      CYSTOSCOPY N/A 2019    Procedure: CYSTOSCOPY;  Surgeon: Yokasta John MD;  Location: [facility];  Service: OB/GYN;  Laterality: N/A;    ESOPHAGOGASTRODUODENOSCOPY N/A 2019    Procedure: EGD (ESOPHAGOGASTRODUODENOSCOPY);  Surgeon: Ferdinand Cooney MD;  Location: [facility];  Service: General;  Laterality: N/A;    LAPAROSCOPIC SALPINGECTOMY Bilateral 2019    Procedure: SALPINGECTOMY, LAPAROSCOPIC;  Surgeon: Yokasta John MD;  Location: [facility];  Service: OB/GYN;  Laterality: Bilateral;    LAPAROSCOPIC TOTAL HYSTERECTOMY N/A 2019    Procedure: HYSTERECTOMY, TOTAL, LAPAROSCOPIC;  Surgeon: Yokasta John MD;  Location: [facility];  Service: OB/GYN;  Laterality: N/A;    TUBAL LIGATION  14       SOCIAL HISTORY:  Social History     Socioeconomic History    Marital status:      Spouse name: Not on file    Number of children: Not on file    Years of education: Not on file    Highest education level: Not on file   Occupational History    Not on file   Social Needs    Financial resource strain: Not on file    Food insecurity:     Worry: Not on file     Inability: Not on file    Transportation needs:     Medical: Not on file     Non-medical: Not on file   Tobacco Use    Smoking status: Never Smoker    Smokeless tobacco: Never Used   Substance and Sexual Activity    Alcohol use: Never     Alcohol/week: 0.0 standard drinks    Drug use: Never    Sexual activity: Not Currently     Partners: Male     Birth control/protection: Other-see comments     Comment: Tubal   Lifestyle    Physical activity:     Days per week: Not on file     Minutes per session: Not on file    Stress: Not on file   Relationships    Social connections:     Talks on phone: Not on file     Gets together: Not on file     Attends Mandaen service: Not on file     Active member of club or organization: Not on  file     Attends meetings of clubs or organizations: Not on file     Relationship status: Not on file   Other Topics Concern    Not on file   Social History Narrative    Not on file       FAMILY HISTORY:  Family History   Problem Relation Age of Onset    Breast cancer Maternal Grandmother 68    Breast cancer Maternal Aunt 56    Esophageal cancer Maternal Aunt     Cancer Neg Hx     Colon cancer Neg Hx     Diabetes Neg Hx     Eclampsia Neg Hx     Miscarriages / Stillbirths Neg Hx     Hypertension Neg Hx     Ovarian cancer Neg Hx      labor Neg Hx     Stroke Neg Hx        ALLERGIES AND MEDICATIONS: updated and reviewed.  Review of patient's allergies indicates:   Allergen Reactions    Celestone [betamethasone] Palpitations     Current Outpatient Medications   Medication Sig Dispense Refill    cyclobenzaprine (FLEXERIL) 5 MG tablet Take 1 tablet (5 mg total) by mouth 3 (three) times daily as needed for Muscle spasms. 28 tablet 0    FLUoxetine 10 MG capsule Take 1 capsule (10 mg total) by mouth once daily. 30 capsule 11     No current facility-administered medications for this visit.          ROS  Review of Systems   Constitutional: Negative for chills and fever.   HENT: Negative for ear pain, postnasal drip and sinus pain.    Respiratory: Negative for cough and shortness of breath.    Cardiovascular: Negative for chest pain.   Gastrointestinal: Negative for diarrhea, nausea and vomiting.           PHYSICAL EXAM    Physical Exam   Constitutional: She is oriented to person, place, and time. She appears well-developed. She does not appear ill. No distress.   Pulmonary/Chest: Effort normal. No respiratory distress.   Musculoskeletal:        Left ankle: She exhibits no ecchymosis.        Feet:    Right great toe ecchymosis at base of toe. No surrounding erythema or ecchymosis appreciated.    Neurological: She is alert and oriented to person, place, and time.   Psychiatric: Her mood appears not  anxious. Her speech is not slurred. She is not agitated. She does not exhibit a depressed mood. She is attentive.             Health Maintenance       Date Due Completion Date    TETANUS VACCINE 08/06/2004 ---            Assessment & Plan    Problem List Items Addressed This Visit     None      Visit Diagnoses     Pain of right great toe    -  Primary    Relevant Orders    X-Ray Toe 2 or More Views Right (Completed)    Close Exposure to Covid-19 Virus        Relevant Orders    COVID-19 (SARS CoV-2) IgG Antibody    Trauma        Relevant Orders    X-Ray Toe 2 or More Views Right (Completed)    Strain of neck muscle, subsequent encounter        Relevant Medications    cyclobenzaprine (FLEXERIL) 5 MG tablet          Follow-up: Follow up for as needed with PCP.    Marisel Mcdaniels    Medication List with Changes/Refills   New Medications    CYCLOBENZAPRINE (FLEXERIL) 5 MG TABLET    Take 1 tablet (5 mg total) by mouth 3 (three) times daily as needed for Muscle spasms.   Current Medications    FLUOXETINE 10 MG CAPSULE    Take 1 capsule (10 mg total) by mouth once daily.

## 2020-05-19 ENCOUNTER — PATIENT MESSAGE (OUTPATIENT)
Dept: PRIMARY CARE CLINIC | Facility: CLINIC | Age: 35
End: 2020-05-19

## 2020-05-19 ENCOUNTER — PATIENT OUTREACH (OUTPATIENT)
Dept: ADMINISTRATIVE | Facility: OTHER | Age: 35
End: 2020-05-19

## 2020-05-19 ENCOUNTER — PATIENT MESSAGE (OUTPATIENT)
Dept: ORTHOPEDICS | Facility: CLINIC | Age: 35
End: 2020-05-19

## 2020-05-20 ENCOUNTER — OFFICE VISIT (OUTPATIENT)
Dept: ORTHOPEDICS | Facility: CLINIC | Age: 35
End: 2020-05-20
Payer: COMMERCIAL

## 2020-05-20 VITALS — BODY MASS INDEX: 21.42 KG/M2 | WEIGHT: 109.13 LBS | HEIGHT: 60 IN

## 2020-05-20 DIAGNOSIS — S92.424A CLOSED NONDISPLACED FRACTURE OF DISTAL PHALANX OF RIGHT GREAT TOE, INITIAL ENCOUNTER: Primary | ICD-10-CM

## 2020-05-20 PROCEDURE — 3008F PR BODY MASS INDEX (BMI) DOCUMENTED: ICD-10-PCS | Mod: CPTII,S$GLB,, | Performed by: PHYSICIAN ASSISTANT

## 2020-05-20 PROCEDURE — 3008F BODY MASS INDEX DOCD: CPT | Mod: CPTII,S$GLB,, | Performed by: PHYSICIAN ASSISTANT

## 2020-05-20 PROCEDURE — 99999 PR PBB SHADOW E&M-EST. PATIENT-LVL III: ICD-10-PCS | Mod: PBBFAC,,, | Performed by: PHYSICIAN ASSISTANT

## 2020-05-20 PROCEDURE — 99203 PR OFFICE/OUTPT VISIT, NEW, LEVL III, 30-44 MIN: ICD-10-PCS | Mod: S$GLB,,, | Performed by: PHYSICIAN ASSISTANT

## 2020-05-20 PROCEDURE — 99999 PR PBB SHADOW E&M-EST. PATIENT-LVL III: CPT | Mod: PBBFAC,,, | Performed by: PHYSICIAN ASSISTANT

## 2020-05-20 PROCEDURE — 99203 OFFICE O/P NEW LOW 30 MIN: CPT | Mod: S$GLB,,, | Performed by: PHYSICIAN ASSISTANT

## 2020-05-20 NOTE — PROGRESS NOTES
SUBJECTIVE:     Chief Complaint & History of Present Illness:  Shena Gomez is a  New  patient 34 y.o. female who is seen here today with a complaint of  right great toe pain .  Patient is here today for follow-up visit after a traumatic fracture the distal phalanx of the right great toe suffered 2020.  Was seen and treated at urgent care the toe was x-rayed and buddy wrapped diagnosed with a nondisplaced fracture at the tuft of the 1st distal phalanx.  She has had some problems with swelling tenderness over the past few days and significant ecchymosis under the toenail  On a scale of 1-10, with 10 being worst pain imaginable, he rates this pain as 2 on good days and 5 on bad days.  she describes the pain as tender.    Review of patient's allergies indicates:   Allergen Reactions    Celestone [betamethasone] Palpitations         Current Outpatient Medications   Medication Sig Dispense Refill    cyclobenzaprine (FLEXERIL) 5 MG tablet Take 1 tablet (5 mg total) by mouth 3 (three) times daily as needed for Muscle spasms. 28 tablet 0    FLUoxetine 10 MG capsule Take 1 capsule (10 mg total) by mouth once daily. 30 capsule 11     No current facility-administered medications for this visit.        Past Medical History:   Diagnosis Date    Anxiety     Chronic sinusitis     Epigastric pain     Kidney stones     history    Nephrolithiasis     OAB (overactive bladder)        Past Surgical History:   Procedure Laterality Date    CERVICAL CERCLAGE  2013     SECTION      CYSTOSCOPY N/A 2019    Procedure: CYSTOSCOPY;  Surgeon: Yokasta John MD;  Location: [facility] OR;  Service: OB/GYN;  Laterality: N/A;    ESOPHAGOGASTRODUODENOSCOPY N/A 2019    Procedure: EGD (ESOPHAGOGASTRODUODENOSCOPY);  Surgeon: Ferdinand Cooney MD;  Location: [facility];  Service: General;  Laterality: N/A;    LAPAROSCOPIC SALPINGECTOMY Bilateral 2019    Procedure: SALPINGECTOMY, LAPAROSCOPIC;   Surgeon: Yokasta John MD;  Location: [facility];  Service: OB/GYN;  Laterality: Bilateral;    LAPAROSCOPIC TOTAL HYSTERECTOMY N/A 8/27/2019    Procedure: HYSTERECTOMY, TOTAL, LAPAROSCOPIC;  Surgeon: Yokasta John MD;  Location: [facility];  Service: OB/GYN;  Laterality: N/A;    TUBAL LIGATION  02/24/14       Vital Signs (Most Recent)  There were no vitals filed for this visit.    Review of Systems:  ROS:  Constitutional: no fever or chills  Eyes: no visual changes  ENT: no nasal congestion or sore throat  Respiratory: no cough or shortness of breath  Cardiovascular: no chest pain or palpitations  Gastrointestinal: no nausea or vomiting, tolerating diet  Genitourinary: no hematuria or dysuria  Integument/Breast: no rash or pruritis  Hematologic/Lymphatic: no easy bruising or lymphadenopathy  Musculoskeletal: no arthralgias or myalgias  Neurological: no seizures or tremors  Behavioral/Psych: no auditory or visual hallucinations  Endocrine: no heat or cold intolerance      OBJECTIVE:     PHYSICAL EXAM:  Height: 5' (152.4 cm) Weight: 49.5 kg (109 lb 2 oz), General Appearance: Well nourished, well developed, in no acute distress.  Neurological: Mood & affect are normal.  right  Foot/Ankle    Skin: bruising  Swelling: minimal  Warmth: no warmth  Tenderness: moderate  ROM: 90 degrees dorsiflexion, 180 degrees plantarflexion, 45 degrees inversion and 45 degrees eversion  Strength: 5 on 5 tibialis anterior strength, 5 of 5 EHL strength, 5 of 5 EDL strength, 5 of 5 FHL and 5 of 5 FDL  Gait: normal    soft tissue swelling noted over the Distal 1st digit with ecchymosis and the nail bed          RADIOGRAPHS:  X-rays from previous visit reviewed by me today demonstrate nondisplaced fracture at the distal tuft of distal phalanx of 1st digit in good alignment with no angulation or rotation    ASSESSMENT/PLAN:       ICD-10-CM ICD-9-CM   1. Closed nondisplaced fracture of distal phalanx of right great toe, initial  encounter S92.424A 826.0       Plan:  Continue adrienne tape the toe  Dressing over the nail for protection, if the nail does fall off cover with clean gauze  Follow-up p.r.n.

## 2020-05-26 ENCOUNTER — PATIENT MESSAGE (OUTPATIENT)
Dept: ORTHOPEDICS | Facility: CLINIC | Age: 35
End: 2020-05-26

## 2020-05-29 ENCOUNTER — PATIENT OUTREACH (OUTPATIENT)
Dept: ADMINISTRATIVE | Facility: OTHER | Age: 35
End: 2020-05-29

## 2020-06-01 ENCOUNTER — OFFICE VISIT (OUTPATIENT)
Dept: OBSTETRICS AND GYNECOLOGY | Facility: CLINIC | Age: 35
End: 2020-06-01
Attending: OBSTETRICS & GYNECOLOGY
Payer: COMMERCIAL

## 2020-06-01 VITALS
SYSTOLIC BLOOD PRESSURE: 110 MMHG | DIASTOLIC BLOOD PRESSURE: 70 MMHG | BODY MASS INDEX: 21.12 KG/M2 | WEIGHT: 107.56 LBS | HEIGHT: 60 IN

## 2020-06-01 DIAGNOSIS — Z01.419 ENCOUNTER FOR GYNECOLOGICAL EXAMINATION WITHOUT ABNORMAL FINDING: ICD-10-CM

## 2020-06-01 DIAGNOSIS — N95.1 MENOPAUSAL SYMPTOM: Primary | ICD-10-CM

## 2020-06-01 PROCEDURE — 99395 PR PREVENTIVE VISIT,EST,18-39: ICD-10-PCS | Mod: S$GLB,,, | Performed by: OBSTETRICS & GYNECOLOGY

## 2020-06-01 PROCEDURE — 99395 PREV VISIT EST AGE 18-39: CPT | Mod: S$GLB,,, | Performed by: OBSTETRICS & GYNECOLOGY

## 2020-06-01 NOTE — PROGRESS NOTES
SUBJECTIVE:   34 y.o. female   for annual routine checkup. Patient's last menstrual period was 2019..  She wants to have her hormones checked. She reports that she is having mood swings which are better on Prozac.        Past Medical History:   Diagnosis Date    Anxiety     Chronic sinusitis     Epigastric pain     Kidney stones     history    Nephrolithiasis     OAB (overactive bladder)      Past Surgical History:   Procedure Laterality Date    CERVICAL CERCLAGE  2013     SECTION      CYSTOSCOPY N/A 2019    Procedure: CYSTOSCOPY;  Surgeon: Yokasta John MD;  Location: [facility];  Service: OB/GYN;  Laterality: N/A;    ESOPHAGOGASTRODUODENOSCOPY N/A 2019    Procedure: EGD (ESOPHAGOGASTRODUODENOSCOPY);  Surgeon: Ferdinand Cooney MD;  Location: [facility];  Service: General;  Laterality: N/A;    LAPAROSCOPIC SALPINGECTOMY Bilateral 2019    Procedure: SALPINGECTOMY, LAPAROSCOPIC;  Surgeon: Yokasta John MD;  Location: [facility];  Service: OB/GYN;  Laterality: Bilateral;    LAPAROSCOPIC TOTAL HYSTERECTOMY N/A 2019    Procedure: HYSTERECTOMY, TOTAL, LAPAROSCOPIC;  Surgeon: Yokasta John MD;  Location: [facility];  Service: OB/GYN;  Laterality: N/A;    TUBAL LIGATION  14     Social History     Socioeconomic History    Marital status:      Spouse name: Not on file    Number of children: Not on file    Years of education: Not on file    Highest education level: Not on file   Occupational History    Not on file   Social Needs    Financial resource strain: Not on file    Food insecurity:     Worry: Not on file     Inability: Not on file    Transportation needs:     Medical: Not on file     Non-medical: Not on file   Tobacco Use    Smoking status: Never Smoker    Smokeless tobacco: Never Used   Substance and Sexual Activity    Alcohol use: Never     Alcohol/week: 0.0 standard drinks    Drug use: Never    Sexual  activity: Not Currently     Partners: Male     Birth control/protection: Other-see comments     Comment: Tubal   Lifestyle    Physical activity:     Days per week: Not on file     Minutes per session: Not on file    Stress: Not on file   Relationships    Social connections:     Talks on phone: Not on file     Gets together: Not on file     Attends Voodoo service: Not on file     Active member of club or organization: Not on file     Attends meetings of clubs or organizations: Not on file     Relationship status: Not on file   Other Topics Concern    Not on file   Social History Narrative    Not on file     Family History   Problem Relation Age of Onset    Breast cancer Maternal Grandmother 68    Breast cancer Maternal Aunt 56    Esophageal cancer Maternal Aunt     Cancer Neg Hx     Colon cancer Neg Hx     Diabetes Neg Hx     Eclampsia Neg Hx     Miscarriages / Stillbirths Neg Hx     Hypertension Neg Hx     Ovarian cancer Neg Hx      labor Neg Hx     Stroke Neg Hx      OB History    Para Term  AB Living   5 4 3 1 1 3   SAB TAB Ectopic Multiple Live Births   1       1      # Outcome Date GA Lbr Reginald/2nd Weight Sex Delivery Anes PTL Lv   5 Term 14 39w1d  3.195 kg (7 lb 0.7 oz) F CS-LTranv Spinal N GUI   4  2012 22w0d  0.709 kg (1 lb 9 oz) M       3 Term 09/10/08 38w0d  3.629 kg (8 lb) M CS-LTranv EPI     2 Term 05 42w0d  3.345 kg (7 lb 6 oz) M CS-LTranv EPI     1 SAB                    Current Outpatient Medications   Medication Sig Dispense Refill    FLUoxetine 10 MG capsule Take 1 capsule (10 mg total) by mouth once daily. 30 capsule 11     No current facility-administered medications for this visit.      Allergies: Celestone [betamethasone]     The ASCVD Risk score (Terrencelaura ZAPATA Jr., et al., 2013) failed to calculate for the following reasons:    The 2013 ASCVD risk score is only valid for ages 40 to 79      ROS:  Constitutional: no weight loss, weight gain,  fever, fatigue  Eyes:  No vision changes, glasses/contacts  ENT/Mouth: No ulcers, sinus problems, ears ringing, headache  Cardiovascular: No inability to lie flat, chest pain, exercise intolerance, swelling, heart palpitations  Respiratory: No wheezing, coughing blood, shortness of breath, or cough  Gastrointestinal: No diarrhea, bloody stool, nausea/vomiting, constipation, gas, hemorrhoids  Genitourinary: No blood in urine, painful urination, urgency of urination, frequency of urination, incomplete emptying, incontinence, abnormal bleeding, painful periods, heavy periods, vaginal discharge, vaginal odor, painful intercourse, sexual problems, bleeding after intercourse.  Musculoskeletal: No muscle weakness  Skin/Breast: No painful breasts, nipple discharge, masses, rash, ulcers  Neurological: No passing out, seizures, numbness, headache  Endocrine: No diabetes, hypothyroid, hyperthyroid, hot flashes, hair loss, abnormal hair growth, acne  Psychiatric: No depression, crying  Hematologic: No bruises, bleeding, swollen lymph nodes, anemia.      Physical Exam:   Constitutional: She is oriented to person, place, and time. She appears well-developed and well-nourished.      Neck: Normal range of motion. No tracheal deviation present. No thyromegaly present.    Cardiovascular: Exam reveals no edema.     Pulmonary/Chest: Effort normal. She exhibits no mass, no tenderness, no deformity and no retraction. Right breast exhibits no inverted nipple, no mass, no nipple discharge, no skin change, no tenderness, presence, no bleeding and no swelling. Left breast exhibits no inverted nipple, no mass, no nipple discharge, no skin change, no tenderness, presence, no bleeding and no swelling. Breasts are symmetrical.        Abdominal: Soft. She exhibits no distension and no mass. There is no tenderness. There is no rebound and no guarding. No hernia. Hernia confirmed negative in the left inguinal area.     Genitourinary: Rectal exam  shows no external hemorrhoid. There is no rash, tenderness or lesion on the right labia. There is no rash, tenderness or lesion on the left labia. Uterus is absent. No no adexnal prolapse. Right adnexum displays no mass, no tenderness and no fullness. Left adnexum displays no mass, no tenderness and no fullness. No tenderness, bleeding, rectocele, cystocele or unspecified prolapse of vaginal walls in the vagina. No vaginal discharge found. Vaginal cuff normal.Cervix exhibits absence.           Musculoskeletal: Normal range of motion and moves all extremeties. She exhibits no edema.      Lymphadenopathy:        Right: No inguinal adenopathy present.        Left: No inguinal adenopathy present.    Neurological: She is alert and oriented to person, place, and time.    Skin: No rash noted. No erythema. No pallor.    Psychiatric: She has a normal mood and affect. Her behavior is normal. Judgment and thought content normal.         ASSESSMENT:   well woman    PLAN:   return annually or prn  Hormone levels today

## 2020-06-11 ENCOUNTER — CLINICAL SUPPORT (OUTPATIENT)
Dept: PRIMARY CARE CLINIC | Facility: CLINIC | Age: 35
End: 2020-06-11
Payer: COMMERCIAL

## 2020-06-11 DIAGNOSIS — Z23 NEED FOR TDAP VACCINATION: Primary | ICD-10-CM

## 2020-06-11 PROCEDURE — 90471 IMMUNIZATION ADMIN: CPT | Mod: S$GLB,,, | Performed by: INTERNAL MEDICINE

## 2020-06-11 PROCEDURE — 90715 TDAP VACCINE 7 YRS/> IM: CPT | Mod: S$GLB,,, | Performed by: INTERNAL MEDICINE

## 2020-06-11 PROCEDURE — 90471 TDAP VACCINE GREATER THAN OR EQUAL TO 7YO IM: ICD-10-PCS | Mod: S$GLB,,, | Performed by: INTERNAL MEDICINE

## 2020-06-11 PROCEDURE — 90715 TDAP VACCINE GREATER THAN OR EQUAL TO 7YO IM: ICD-10-PCS | Mod: S$GLB,,, | Performed by: INTERNAL MEDICINE

## 2020-06-11 NOTE — PROGRESS NOTES
Verified pt ID using name and . Allergies verfied. Administered Tdap in 0.5 Mg/ml per physician order using aseptic technique. Aspirated and no blood return noted. Pt tolerated well with no adverse reactions noted.

## 2020-07-24 ENCOUNTER — OFFICE VISIT (OUTPATIENT)
Dept: PRIMARY CARE CLINIC | Facility: CLINIC | Age: 35
End: 2020-07-24
Payer: COMMERCIAL

## 2020-07-24 VITALS
SYSTOLIC BLOOD PRESSURE: 102 MMHG | DIASTOLIC BLOOD PRESSURE: 66 MMHG | WEIGHT: 109 LBS | RESPIRATION RATE: 16 BRPM | HEIGHT: 60 IN | BODY MASS INDEX: 21.4 KG/M2 | TEMPERATURE: 99 F | HEART RATE: 93 BPM | OXYGEN SATURATION: 99 %

## 2020-07-24 DIAGNOSIS — F41.9 ANXIETY: ICD-10-CM

## 2020-07-24 DIAGNOSIS — R00.2 PALPITATIONS: Primary | ICD-10-CM

## 2020-07-24 PROCEDURE — 99213 OFFICE O/P EST LOW 20 MIN: CPT | Mod: S$GLB,,, | Performed by: NURSE PRACTITIONER

## 2020-07-24 PROCEDURE — 3008F PR BODY MASS INDEX (BMI) DOCUMENTED: ICD-10-PCS | Mod: CPTII,S$GLB,, | Performed by: NURSE PRACTITIONER

## 2020-07-24 PROCEDURE — 99999 PR PBB SHADOW E&M-EST. PATIENT-LVL III: CPT | Mod: PBBFAC,,, | Performed by: NURSE PRACTITIONER

## 2020-07-24 PROCEDURE — 99213 PR OFFICE/OUTPT VISIT, EST, LEVL III, 20-29 MIN: ICD-10-PCS | Mod: S$GLB,,, | Performed by: NURSE PRACTITIONER

## 2020-07-24 PROCEDURE — 99999 PR PBB SHADOW E&M-EST. PATIENT-LVL III: ICD-10-PCS | Mod: PBBFAC,,, | Performed by: NURSE PRACTITIONER

## 2020-07-24 PROCEDURE — 93005 EKG 12-LEAD: ICD-10-PCS | Mod: S$GLB,,, | Performed by: NURSE PRACTITIONER

## 2020-07-24 PROCEDURE — 93005 ELECTROCARDIOGRAM TRACING: CPT | Mod: S$GLB,,, | Performed by: NURSE PRACTITIONER

## 2020-07-24 PROCEDURE — 3008F BODY MASS INDEX DOCD: CPT | Mod: CPTII,S$GLB,, | Performed by: NURSE PRACTITIONER

## 2020-07-24 NOTE — PROGRESS NOTES
Chief Complaint  Chief Complaint   Patient presents with    Palpitations       HPI    Shena Gomez is a 34 y.o. female that presents for palpitations.    Patient reports the onset of chest palpitations approximately 1 week ago. Occurring throughout the day, occurring daily. Primarily notices it at night when she is laying down quietly. Does report an associated slight pain. Not associated with exertion. No monster drinks. Drinks and 8 oz cup of coffee. No sudafed or antihistamine. Hydrating about the same, not necessarily well. No headaches, blurred vision. No lower extremity swelling.     PAST MEDICAL HISTORY:  Past Medical History:   Diagnosis Date    Anxiety     Chronic sinusitis     Epigastric pain     Kidney stones     history    Nephrolithiasis     OAB (overactive bladder)        PAST SURGICAL HISTORY:  Past Surgical History:   Procedure Laterality Date    CERVICAL CERCLAGE  2013     SECTION      CYSTOSCOPY N/A 2019    Procedure: CYSTOSCOPY;  Surgeon: Yokasta John MD;  Location: [facility];  Service: OB/GYN;  Laterality: N/A;    ESOPHAGOGASTRODUODENOSCOPY N/A 2019    Procedure: EGD (ESOPHAGOGASTRODUODENOSCOPY);  Surgeon: Ferdinand Cooney MD;  Location: [facility];  Service: General;  Laterality: N/A;    LAPAROSCOPIC SALPINGECTOMY Bilateral 2019    Procedure: SALPINGECTOMY, LAPAROSCOPIC;  Surgeon: Yokasta John MD;  Location: [facility];  Service: OB/GYN;  Laterality: Bilateral;    LAPAROSCOPIC TOTAL HYSTERECTOMY N/A 2019    Procedure: HYSTERECTOMY, TOTAL, LAPAROSCOPIC;  Surgeon: Yokasta John MD;  Location: [facility];  Service: OB/GYN;  Laterality: N/A;    TUBAL LIGATION  14       SOCIAL HISTORY:  Social History     Socioeconomic History    Marital status:      Spouse name: Not on file    Number of children: Not on file    Years of education: Not on file    Highest education level: Not on file   Occupational  History    Not on file   Social Needs    Financial resource strain: Not on file    Food insecurity     Worry: Not on file     Inability: Not on file    Transportation needs     Medical: Not on file     Non-medical: Not on file   Tobacco Use    Smoking status: Never Smoker    Smokeless tobacco: Never Used   Substance and Sexual Activity    Alcohol use: Never     Alcohol/week: 0.0 standard drinks    Drug use: Never    Sexual activity: Not Currently     Partners: Male     Birth control/protection: Other-see comments     Comment: Tubal   Lifestyle    Physical activity     Days per week: Not on file     Minutes per session: Not on file    Stress: Not on file   Relationships    Social connections     Talks on phone: Not on file     Gets together: Not on file     Attends Sabianist service: Not on file     Active member of club or organization: Not on file     Attends meetings of clubs or organizations: Not on file     Relationship status: Not on file   Other Topics Concern    Not on file   Social History Narrative    Not on file       FAMILY HISTORY:  Family History   Problem Relation Age of Onset    Breast cancer Maternal Grandmother 68    Breast cancer Maternal Aunt 56    Esophageal cancer Maternal Aunt     Cancer Neg Hx     Colon cancer Neg Hx     Diabetes Neg Hx     Eclampsia Neg Hx     Miscarriages / Stillbirths Neg Hx     Hypertension Neg Hx     Ovarian cancer Neg Hx      labor Neg Hx     Stroke Neg Hx        ALLERGIES AND MEDICATIONS: updated and reviewed.  Review of patient's allergies indicates:   Allergen Reactions    Celestone [betamethasone] Palpitations     Current Outpatient Medications   Medication Sig Dispense Refill    FLUoxetine 10 MG capsule Take 1 capsule (10 mg total) by mouth once daily. 30 capsule 11     No current facility-administered medications for this visit.          ROS  Review of Systems   Constitutional: Negative for chills and fever.   HENT: Negative for  ear pain, postnasal drip and sinus pain.    Respiratory: Negative for cough and shortness of breath.    Cardiovascular: Positive for palpitations. Negative for chest pain.   Gastrointestinal: Negative for diarrhea, nausea and vomiting.           PHYSICAL EXAM  Vitals:    07/24/20 1457   BP: 102/66   BP Location: Left arm   Patient Position: Sitting   BP Method: Medium (Manual)   Pulse: 93   Resp: 16   Temp: 98.5 °F (36.9 °C)   TempSrc: Oral   SpO2: 99%   Weight: 49.5 kg (109 lb 0.3 oz)   Height: 5' (1.524 m)    Body mass index is 21.29 kg/m².  Weight: 49.5 kg (109 lb 0.3 oz)   Height: 5' (152.4 cm)     Physical Exam  Constitutional:       Appearance: She is well-developed.   HENT:      Head: Normocephalic.      Right Ear: Tympanic membrane normal.      Left Ear: Tympanic membrane normal.      Mouth/Throat:      Pharynx: Uvula midline.   Eyes:      Conjunctiva/sclera: Conjunctivae normal.   Cardiovascular:      Rate and Rhythm: Normal rate and regular rhythm.      Pulses: Normal pulses.           Radial pulses are 2+ on the right side and 2+ on the left side.      Heart sounds: Normal heart sounds. No murmur.      Comments: No LE swelling noted  Pulmonary:      Effort: Pulmonary effort is normal.      Breath sounds: Normal breath sounds. No wheezing.   Abdominal:      General: Bowel sounds are normal.      Palpations: Abdomen is soft.      Tenderness: There is no abdominal tenderness.   Lymphadenopathy:      Cervical: No cervical adenopathy.   Skin:     General: Skin is warm and dry.      Findings: No rash.   Neurological:      Mental Status: She is alert and oriented to person, place, and time.           Health Maintenance       Date Due Completion Date    Hepatitis C Screening 1985 ---    Influenza Vaccine (1) 09/01/2020 12/17/2019    TETANUS VACCINE 06/11/2030 6/11/2020            Assessment & Plan    Problem List Items Addressed This Visit        Unprioritized    Anxiety      Other Visit Diagnoses      Palpitations    -  Primary    Relevant Orders    IN OFFICE EKG 12-LEAD (to Muse)  EKG WNL. Offered holter monitor but patient opted to defer and continue only with persistent symptoms.           Follow-up: Follow up if symptoms worsen or fail to improve.    Marisel Mcdaniels    Medication List with Changes/Refills   Current Medications    FLUOXETINE 10 MG CAPSULE    Take 1 capsule (10 mg total) by mouth once daily.

## 2020-07-27 PROBLEM — R10.13 EPIGASTRIC ABDOMINAL PAIN: Status: RESOLVED | Noted: 2019-12-23 | Resolved: 2020-07-27

## 2020-07-27 PROBLEM — R10.9 ABDOMINAL PAIN: Status: RESOLVED | Noted: 2019-12-23 | Resolved: 2020-07-27

## 2020-07-27 PROBLEM — F41.9 ANXIETY: Status: ACTIVE | Noted: 2020-07-27

## 2020-07-28 DIAGNOSIS — R00.2 PALPITATIONS: Primary | ICD-10-CM

## 2020-08-05 ENCOUNTER — TELEPHONE (OUTPATIENT)
Dept: PRIMARY CARE CLINIC | Facility: CLINIC | Age: 35
End: 2020-08-05

## 2020-08-05 NOTE — TELEPHONE ENCOUNTER
----- Message from Iwona Hines sent at 8/5/2020 11:01 AM CDT -----  Regarding: Patient wanted the provider to know something  Called patient to reschedule her Holter- 48hr that was canceled. Patient stated she called to reschedule her appointment and the  who she spoke to told the patient that she couldn't reschedule. Whomever the  was told the patient that she would have to returned back to the clinic to see the provider and receive a new order. Patient stated she didn't want to argue with the  and just agreed. Patient also stated she wasn't going to call provider due to the pandemic as well as the provider at the time was real busy. Patient was very upset that the  refused to reschedule her appointment. Patient was glad to receive a call from me This morning to reschedule her appointment. Patient just wanted the provider to know about the encounter.

## 2020-09-23 ENCOUNTER — PATIENT MESSAGE (OUTPATIENT)
Dept: PRIMARY CARE CLINIC | Facility: CLINIC | Age: 35
End: 2020-09-23

## 2020-09-27 ENCOUNTER — PATIENT MESSAGE (OUTPATIENT)
Dept: PRIMARY CARE CLINIC | Facility: CLINIC | Age: 35
End: 2020-09-27

## 2020-10-02 ENCOUNTER — PATIENT MESSAGE (OUTPATIENT)
Dept: PRIMARY CARE CLINIC | Facility: CLINIC | Age: 35
End: 2020-10-02

## 2020-10-06 ENCOUNTER — TELEPHONE (OUTPATIENT)
Dept: PRIMARY CARE CLINIC | Facility: CLINIC | Age: 35
End: 2020-10-06

## 2020-10-06 NOTE — TELEPHONE ENCOUNTER
----- Message from Tanesha Hwang sent at 10/6/2020  3:47 PM CDT -----  Contact: Patient  Type:  Test Results    Who Called:  Shena, patient  Name of Test (Lab/Mammo/Etc):  Holter Monitor  Date of Test:  09/28-09/30/2020  Ordering Provider:  Marisel Mcdaniels NP  Where the test was performed:  [facility]  Best Call Back Number:  [phone]  Additional Information:  Please call her. Thanks.

## 2020-12-28 ENCOUNTER — PATIENT MESSAGE (OUTPATIENT)
Dept: PRIMARY CARE CLINIC | Facility: CLINIC | Age: 35
End: 2020-12-28

## 2021-01-07 ENCOUNTER — PATIENT MESSAGE (OUTPATIENT)
Dept: PRIMARY CARE CLINIC | Facility: CLINIC | Age: 36
End: 2021-01-07

## 2021-01-07 ENCOUNTER — OFFICE VISIT (OUTPATIENT)
Dept: PRIMARY CARE CLINIC | Facility: CLINIC | Age: 36
End: 2021-01-07
Payer: COMMERCIAL

## 2021-01-07 VITALS
OXYGEN SATURATION: 99 % | BODY MASS INDEX: 22.22 KG/M2 | RESPIRATION RATE: 19 BRPM | SYSTOLIC BLOOD PRESSURE: 106 MMHG | WEIGHT: 113.19 LBS | HEIGHT: 60 IN | TEMPERATURE: 98 F | DIASTOLIC BLOOD PRESSURE: 76 MMHG | HEART RATE: 90 BPM

## 2021-01-07 DIAGNOSIS — R53.82 CHRONIC FATIGUE: Primary | ICD-10-CM

## 2021-01-07 DIAGNOSIS — Z13.6 ENCOUNTER FOR SCREENING FOR CARDIOVASCULAR DISORDERS: ICD-10-CM

## 2021-01-07 DIAGNOSIS — M62.838 MUSCLE SPASM: ICD-10-CM

## 2021-01-07 DIAGNOSIS — R51.9 NONINTRACTABLE EPISODIC HEADACHE, UNSPECIFIED HEADACHE TYPE: ICD-10-CM

## 2021-01-07 PROCEDURE — 99999 PR PBB SHADOW E&M-EST. PATIENT-LVL IV: ICD-10-PCS | Mod: PBBFAC,,, | Performed by: STUDENT IN AN ORGANIZED HEALTH CARE EDUCATION/TRAINING PROGRAM

## 2021-01-07 PROCEDURE — 99214 PR OFFICE/OUTPT VISIT, EST, LEVL IV, 30-39 MIN: ICD-10-PCS | Mod: S$GLB,,, | Performed by: STUDENT IN AN ORGANIZED HEALTH CARE EDUCATION/TRAINING PROGRAM

## 2021-01-07 PROCEDURE — 3008F BODY MASS INDEX DOCD: CPT | Mod: CPTII,S$GLB,, | Performed by: STUDENT IN AN ORGANIZED HEALTH CARE EDUCATION/TRAINING PROGRAM

## 2021-01-07 PROCEDURE — 3008F PR BODY MASS INDEX (BMI) DOCUMENTED: ICD-10-PCS | Mod: CPTII,S$GLB,, | Performed by: STUDENT IN AN ORGANIZED HEALTH CARE EDUCATION/TRAINING PROGRAM

## 2021-01-07 PROCEDURE — 1126F AMNT PAIN NOTED NONE PRSNT: CPT | Mod: S$GLB,,, | Performed by: STUDENT IN AN ORGANIZED HEALTH CARE EDUCATION/TRAINING PROGRAM

## 2021-01-07 PROCEDURE — 1126F PR PAIN SEVERITY QUANTIFIED, NO PAIN PRESENT: ICD-10-PCS | Mod: S$GLB,,, | Performed by: STUDENT IN AN ORGANIZED HEALTH CARE EDUCATION/TRAINING PROGRAM

## 2021-01-07 PROCEDURE — 99999 PR PBB SHADOW E&M-EST. PATIENT-LVL IV: CPT | Mod: PBBFAC,,, | Performed by: STUDENT IN AN ORGANIZED HEALTH CARE EDUCATION/TRAINING PROGRAM

## 2021-01-07 PROCEDURE — 99214 OFFICE O/P EST MOD 30 MIN: CPT | Mod: S$GLB,,, | Performed by: STUDENT IN AN ORGANIZED HEALTH CARE EDUCATION/TRAINING PROGRAM

## 2021-01-07 RX ORDER — CYCLOBENZAPRINE HCL 5 MG
5 TABLET ORAL 3 TIMES DAILY PRN
COMMUNITY
End: 2021-01-07

## 2021-01-07 RX ORDER — NAPROXEN 500 MG/1
500 TABLET ORAL 2 TIMES DAILY PRN
Qty: 40 TABLET | Refills: 1 | Status: SHIPPED | OUTPATIENT
Start: 2021-01-07 | End: 2022-02-22

## 2021-01-07 RX ORDER — CYCLOBENZAPRINE HCL 5 MG
5 TABLET ORAL 3 TIMES DAILY PRN
Qty: 60 TABLET | Refills: 0 | Status: SHIPPED | OUTPATIENT
Start: 2021-01-07 | End: 2021-02-16

## 2021-01-11 ENCOUNTER — PATIENT MESSAGE (OUTPATIENT)
Dept: PRIMARY CARE CLINIC | Facility: CLINIC | Age: 36
End: 2021-01-11

## 2021-01-11 DIAGNOSIS — F41.1 GAD (GENERALIZED ANXIETY DISORDER): ICD-10-CM

## 2021-01-11 RX ORDER — FLUOXETINE 10 MG/1
10 CAPSULE ORAL DAILY
Qty: 90 CAPSULE | Refills: 3 | Status: SHIPPED | OUTPATIENT
Start: 2021-01-11 | End: 2022-02-25

## 2021-01-20 ENCOUNTER — PATIENT MESSAGE (OUTPATIENT)
Dept: PRIMARY CARE CLINIC | Facility: CLINIC | Age: 36
End: 2021-01-20

## 2021-02-07 ENCOUNTER — PATIENT MESSAGE (OUTPATIENT)
Dept: OBSTETRICS AND GYNECOLOGY | Facility: CLINIC | Age: 36
End: 2021-02-07

## 2021-02-09 ENCOUNTER — OFFICE VISIT (OUTPATIENT)
Dept: OBSTETRICS AND GYNECOLOGY | Facility: CLINIC | Age: 36
End: 2021-02-09
Payer: COMMERCIAL

## 2021-02-09 VITALS
SYSTOLIC BLOOD PRESSURE: 110 MMHG | WEIGHT: 114.44 LBS | HEIGHT: 60 IN | DIASTOLIC BLOOD PRESSURE: 72 MMHG | BODY MASS INDEX: 22.47 KG/M2

## 2021-02-09 DIAGNOSIS — N90.7 VULVAR CYST: Primary | ICD-10-CM

## 2021-02-09 PROCEDURE — 1126F PR PAIN SEVERITY QUANTIFIED, NO PAIN PRESENT: ICD-10-PCS | Mod: S$GLB,,, | Performed by: PHYSICIAN ASSISTANT

## 2021-02-09 PROCEDURE — 87186 SC STD MICRODIL/AGAR DIL: CPT

## 2021-02-09 PROCEDURE — 87070 CULTURE OTHR SPECIMN AEROBIC: CPT

## 2021-02-09 PROCEDURE — 87077 CULTURE AEROBIC IDENTIFY: CPT

## 2021-02-09 PROCEDURE — 3008F PR BODY MASS INDEX (BMI) DOCUMENTED: ICD-10-PCS | Mod: CPTII,S$GLB,, | Performed by: PHYSICIAN ASSISTANT

## 2021-02-09 PROCEDURE — 3008F BODY MASS INDEX DOCD: CPT | Mod: CPTII,S$GLB,, | Performed by: PHYSICIAN ASSISTANT

## 2021-02-09 PROCEDURE — 1126F AMNT PAIN NOTED NONE PRSNT: CPT | Mod: S$GLB,,, | Performed by: PHYSICIAN ASSISTANT

## 2021-02-09 PROCEDURE — 99213 PR OFFICE/OUTPT VISIT, EST, LEVL III, 20-29 MIN: ICD-10-PCS | Mod: S$GLB,,, | Performed by: PHYSICIAN ASSISTANT

## 2021-02-09 PROCEDURE — 99213 OFFICE O/P EST LOW 20 MIN: CPT | Mod: S$GLB,,, | Performed by: PHYSICIAN ASSISTANT

## 2021-02-09 RX ORDER — MUPIROCIN 20 MG/G
OINTMENT TOPICAL 2 TIMES DAILY
Qty: 15 G | Refills: 0 | Status: SHIPPED | OUTPATIENT
Start: 2021-02-09 | End: 2022-02-22

## 2021-02-12 ENCOUNTER — PATIENT MESSAGE (OUTPATIENT)
Dept: OBSTETRICS AND GYNECOLOGY | Facility: CLINIC | Age: 36
End: 2021-02-12

## 2021-02-12 LAB — BACTERIA SPEC AEROBE CULT: ABNORMAL

## 2021-02-12 RX ORDER — SULFAMETHOXAZOLE AND TRIMETHOPRIM 800; 160 MG/1; MG/1
1 TABLET ORAL 2 TIMES DAILY
Qty: 20 TABLET | Refills: 0 | Status: SHIPPED | OUTPATIENT
Start: 2021-02-12 | End: 2021-02-22

## 2021-03-15 ENCOUNTER — PATIENT MESSAGE (OUTPATIENT)
Dept: OBSTETRICS AND GYNECOLOGY | Facility: CLINIC | Age: 36
End: 2021-03-15

## 2021-03-23 ENCOUNTER — PATIENT MESSAGE (OUTPATIENT)
Dept: PRIMARY CARE CLINIC | Facility: CLINIC | Age: 36
End: 2021-03-23

## 2021-03-23 DIAGNOSIS — R10.9 ABDOMINAL PAIN, UNSPECIFIED ABDOMINAL LOCATION: Primary | ICD-10-CM

## 2021-04-26 ENCOUNTER — PATIENT MESSAGE (OUTPATIENT)
Dept: RESEARCH | Facility: HOSPITAL | Age: 36
End: 2021-04-26

## 2021-07-21 ENCOUNTER — OFFICE VISIT (OUTPATIENT)
Dept: OBSTETRICS AND GYNECOLOGY | Facility: CLINIC | Age: 36
End: 2021-07-21
Attending: OBSTETRICS & GYNECOLOGY
Payer: COMMERCIAL

## 2021-07-21 VITALS
WEIGHT: 114.88 LBS | HEIGHT: 60 IN | BODY MASS INDEX: 22.55 KG/M2 | SYSTOLIC BLOOD PRESSURE: 110 MMHG | DIASTOLIC BLOOD PRESSURE: 68 MMHG

## 2021-07-21 DIAGNOSIS — Z01.419 ENCOUNTER FOR GYNECOLOGICAL EXAMINATION WITHOUT ABNORMAL FINDING: ICD-10-CM

## 2021-07-21 DIAGNOSIS — N95.1 MENOPAUSAL SYMPTOM: Primary | ICD-10-CM

## 2021-07-21 PROCEDURE — 3008F BODY MASS INDEX DOCD: CPT | Mod: CPTII,S$GLB,, | Performed by: OBSTETRICS & GYNECOLOGY

## 2021-07-21 PROCEDURE — 1126F PR PAIN SEVERITY QUANTIFIED, NO PAIN PRESENT: ICD-10-PCS | Mod: CPTII,S$GLB,, | Performed by: OBSTETRICS & GYNECOLOGY

## 2021-07-21 PROCEDURE — 1126F AMNT PAIN NOTED NONE PRSNT: CPT | Mod: CPTII,S$GLB,, | Performed by: OBSTETRICS & GYNECOLOGY

## 2021-07-21 PROCEDURE — 3008F PR BODY MASS INDEX (BMI) DOCUMENTED: ICD-10-PCS | Mod: CPTII,S$GLB,, | Performed by: OBSTETRICS & GYNECOLOGY

## 2021-07-21 PROCEDURE — 99395 PREV VISIT EST AGE 18-39: CPT | Mod: S$GLB,,, | Performed by: OBSTETRICS & GYNECOLOGY

## 2021-07-21 PROCEDURE — 99395 PR PREVENTIVE VISIT,EST,18-39: ICD-10-PCS | Mod: S$GLB,,, | Performed by: OBSTETRICS & GYNECOLOGY

## 2021-07-21 RX ORDER — PRASTERONE 6.5 MG/1
6.5 INSERT VAGINAL NIGHTLY
Qty: 30 EACH | Refills: 11 | Status: SHIPPED | OUTPATIENT
Start: 2021-07-21 | End: 2021-08-10

## 2021-08-03 ENCOUNTER — PATIENT MESSAGE (OUTPATIENT)
Dept: OBSTETRICS AND GYNECOLOGY | Facility: CLINIC | Age: 36
End: 2021-08-03

## 2021-08-09 ENCOUNTER — PATIENT OUTREACH (OUTPATIENT)
Dept: ADMINISTRATIVE | Facility: OTHER | Age: 36
End: 2021-08-09

## 2021-08-10 ENCOUNTER — PATIENT MESSAGE (OUTPATIENT)
Dept: OBSTETRICS AND GYNECOLOGY | Facility: CLINIC | Age: 36
End: 2021-08-10

## 2021-08-10 ENCOUNTER — OFFICE VISIT (OUTPATIENT)
Dept: OBSTETRICS AND GYNECOLOGY | Facility: CLINIC | Age: 36
End: 2021-08-10
Payer: COMMERCIAL

## 2021-08-10 VITALS
WEIGHT: 114.19 LBS | SYSTOLIC BLOOD PRESSURE: 110 MMHG | BODY MASS INDEX: 22.42 KG/M2 | HEIGHT: 60 IN | DIASTOLIC BLOOD PRESSURE: 70 MMHG

## 2021-08-10 DIAGNOSIS — N90.89 VULVAR MASS: ICD-10-CM

## 2021-08-10 DIAGNOSIS — N89.8 VAGINAL IRRITATION: ICD-10-CM

## 2021-08-10 DIAGNOSIS — N95.2 VAGINAL ATROPHY: Primary | ICD-10-CM

## 2021-08-10 PROCEDURE — 99213 PR OFFICE/OUTPT VISIT, EST, LEVL III, 20-29 MIN: ICD-10-PCS | Mod: S$GLB,,, | Performed by: PHYSICIAN ASSISTANT

## 2021-08-10 PROCEDURE — 3078F DIAST BP <80 MM HG: CPT | Mod: CPTII,S$GLB,, | Performed by: PHYSICIAN ASSISTANT

## 2021-08-10 PROCEDURE — 3074F SYST BP LT 130 MM HG: CPT | Mod: CPTII,S$GLB,, | Performed by: PHYSICIAN ASSISTANT

## 2021-08-10 PROCEDURE — 87070 CULTURE OTHR SPECIMN AEROBIC: CPT | Performed by: PHYSICIAN ASSISTANT

## 2021-08-10 PROCEDURE — 3074F PR MOST RECENT SYSTOLIC BLOOD PRESSURE < 130 MM HG: ICD-10-PCS | Mod: CPTII,S$GLB,, | Performed by: PHYSICIAN ASSISTANT

## 2021-08-10 PROCEDURE — 1126F AMNT PAIN NOTED NONE PRSNT: CPT | Mod: CPTII,S$GLB,, | Performed by: PHYSICIAN ASSISTANT

## 2021-08-10 PROCEDURE — 1160F PR REVIEW ALL MEDS BY PRESCRIBER/CLIN PHARMACIST DOCUMENTED: ICD-10-PCS | Mod: CPTII,S$GLB,, | Performed by: PHYSICIAN ASSISTANT

## 2021-08-10 PROCEDURE — 87481 CANDIDA DNA AMP PROBE: CPT | Mod: 59 | Performed by: PHYSICIAN ASSISTANT

## 2021-08-10 PROCEDURE — 87186 SC STD MICRODIL/AGAR DIL: CPT | Performed by: PHYSICIAN ASSISTANT

## 2021-08-10 PROCEDURE — 1159F MED LIST DOCD IN RCRD: CPT | Mod: CPTII,S$GLB,, | Performed by: PHYSICIAN ASSISTANT

## 2021-08-10 PROCEDURE — 99213 OFFICE O/P EST LOW 20 MIN: CPT | Mod: S$GLB,,, | Performed by: PHYSICIAN ASSISTANT

## 2021-08-10 PROCEDURE — 3008F BODY MASS INDEX DOCD: CPT | Mod: CPTII,S$GLB,, | Performed by: PHYSICIAN ASSISTANT

## 2021-08-10 PROCEDURE — 1126F PR PAIN SEVERITY QUANTIFIED, NO PAIN PRESENT: ICD-10-PCS | Mod: CPTII,S$GLB,, | Performed by: PHYSICIAN ASSISTANT

## 2021-08-10 PROCEDURE — 87077 CULTURE AEROBIC IDENTIFY: CPT | Performed by: PHYSICIAN ASSISTANT

## 2021-08-10 PROCEDURE — 1160F RVW MEDS BY RX/DR IN RCRD: CPT | Mod: CPTII,S$GLB,, | Performed by: PHYSICIAN ASSISTANT

## 2021-08-10 PROCEDURE — 3078F PR MOST RECENT DIASTOLIC BLOOD PRESSURE < 80 MM HG: ICD-10-PCS | Mod: CPTII,S$GLB,, | Performed by: PHYSICIAN ASSISTANT

## 2021-08-10 PROCEDURE — 1159F PR MEDICATION LIST DOCUMENTED IN MEDICAL RECORD: ICD-10-PCS | Mod: CPTII,S$GLB,, | Performed by: PHYSICIAN ASSISTANT

## 2021-08-10 PROCEDURE — 3008F PR BODY MASS INDEX (BMI) DOCUMENTED: ICD-10-PCS | Mod: CPTII,S$GLB,, | Performed by: PHYSICIAN ASSISTANT

## 2021-08-10 RX ORDER — ESTRADIOL 0.1 MG/G
1 CREAM VAGINAL
Qty: 42.5 G | Refills: 3 | Status: SHIPPED | OUTPATIENT
Start: 2021-08-10 | End: 2021-09-21

## 2021-08-10 RX ORDER — SULFAMETHOXAZOLE AND TRIMETHOPRIM 800; 160 MG/1; MG/1
1 TABLET ORAL 2 TIMES DAILY
Qty: 20 TABLET | Refills: 0 | Status: SHIPPED | OUTPATIENT
Start: 2021-08-10 | End: 2021-08-20

## 2021-08-12 LAB — BACTERIA SPEC AEROBE CULT: ABNORMAL

## 2021-09-09 ENCOUNTER — PATIENT MESSAGE (OUTPATIENT)
Dept: OBSTETRICS AND GYNECOLOGY | Facility: CLINIC | Age: 36
End: 2021-09-09

## 2021-09-13 ENCOUNTER — PATIENT OUTREACH (OUTPATIENT)
Dept: ADMINISTRATIVE | Facility: OTHER | Age: 36
End: 2021-09-13

## 2021-09-21 ENCOUNTER — OFFICE VISIT (OUTPATIENT)
Dept: OBSTETRICS AND GYNECOLOGY | Facility: CLINIC | Age: 36
End: 2021-09-21
Payer: COMMERCIAL

## 2021-09-21 VITALS
HEIGHT: 60 IN | WEIGHT: 114 LBS | BODY MASS INDEX: 22.38 KG/M2 | DIASTOLIC BLOOD PRESSURE: 76 MMHG | SYSTOLIC BLOOD PRESSURE: 110 MMHG

## 2021-09-21 DIAGNOSIS — R10.2 PELVIC PAIN: Primary | ICD-10-CM

## 2021-09-21 LAB
BILIRUB UR QL STRIP: NEGATIVE
CLARITY UR REFRACT.AUTO: ABNORMAL
COLOR UR AUTO: YELLOW
GLUCOSE UR QL STRIP: NEGATIVE
HGB UR QL STRIP: NEGATIVE
KETONES UR QL STRIP: NEGATIVE
LEUKOCYTE ESTERASE UR QL STRIP: NEGATIVE
NITRITE UR QL STRIP: NEGATIVE
PH UR STRIP: 5 [PH] (ref 5–8)
PROT UR QL STRIP: NEGATIVE
SP GR UR STRIP: 1.01 (ref 1–1.03)
URN SPEC COLLECT METH UR: ABNORMAL

## 2021-09-21 PROCEDURE — 1160F RVW MEDS BY RX/DR IN RCRD: CPT | Mod: CPTII,S$GLB,, | Performed by: PHYSICIAN ASSISTANT

## 2021-09-21 PROCEDURE — 3074F SYST BP LT 130 MM HG: CPT | Mod: CPTII,S$GLB,, | Performed by: PHYSICIAN ASSISTANT

## 2021-09-21 PROCEDURE — 1159F MED LIST DOCD IN RCRD: CPT | Mod: CPTII,S$GLB,, | Performed by: PHYSICIAN ASSISTANT

## 2021-09-21 PROCEDURE — 3078F PR MOST RECENT DIASTOLIC BLOOD PRESSURE < 80 MM HG: ICD-10-PCS | Mod: CPTII,S$GLB,, | Performed by: PHYSICIAN ASSISTANT

## 2021-09-21 PROCEDURE — 3078F DIAST BP <80 MM HG: CPT | Mod: CPTII,S$GLB,, | Performed by: PHYSICIAN ASSISTANT

## 2021-09-21 PROCEDURE — 87086 URINE CULTURE/COLONY COUNT: CPT | Performed by: PHYSICIAN ASSISTANT

## 2021-09-21 PROCEDURE — 81003 URINALYSIS AUTO W/O SCOPE: CPT | Performed by: PHYSICIAN ASSISTANT

## 2021-09-21 PROCEDURE — 3074F PR MOST RECENT SYSTOLIC BLOOD PRESSURE < 130 MM HG: ICD-10-PCS | Mod: CPTII,S$GLB,, | Performed by: PHYSICIAN ASSISTANT

## 2021-09-21 PROCEDURE — 99213 OFFICE O/P EST LOW 20 MIN: CPT | Mod: S$GLB,,, | Performed by: PHYSICIAN ASSISTANT

## 2021-09-21 PROCEDURE — 1160F PR REVIEW ALL MEDS BY PRESCRIBER/CLIN PHARMACIST DOCUMENTED: ICD-10-PCS | Mod: CPTII,S$GLB,, | Performed by: PHYSICIAN ASSISTANT

## 2021-09-21 PROCEDURE — 3008F BODY MASS INDEX DOCD: CPT | Mod: CPTII,S$GLB,, | Performed by: PHYSICIAN ASSISTANT

## 2021-09-21 PROCEDURE — 1159F PR MEDICATION LIST DOCUMENTED IN MEDICAL RECORD: ICD-10-PCS | Mod: CPTII,S$GLB,, | Performed by: PHYSICIAN ASSISTANT

## 2021-09-21 PROCEDURE — 99213 PR OFFICE/OUTPT VISIT, EST, LEVL III, 20-29 MIN: ICD-10-PCS | Mod: S$GLB,,, | Performed by: PHYSICIAN ASSISTANT

## 2021-09-21 PROCEDURE — 3008F PR BODY MASS INDEX (BMI) DOCUMENTED: ICD-10-PCS | Mod: CPTII,S$GLB,, | Performed by: PHYSICIAN ASSISTANT

## 2021-09-21 RX ORDER — PRASTERONE 6.5 MG/1
INSERT VAGINAL
Status: ON HOLD | COMMUNITY
Start: 2021-09-19 | End: 2022-10-05 | Stop reason: CLARIF

## 2021-09-22 LAB — BACTERIA UR CULT: NORMAL

## 2021-10-05 ENCOUNTER — PATIENT MESSAGE (OUTPATIENT)
Dept: OBSTETRICS AND GYNECOLOGY | Facility: CLINIC | Age: 36
End: 2021-10-05

## 2021-11-10 ENCOUNTER — PATIENT MESSAGE (OUTPATIENT)
Dept: OBSTETRICS AND GYNECOLOGY | Facility: CLINIC | Age: 36
End: 2021-11-10
Payer: COMMERCIAL

## 2021-12-13 ENCOUNTER — PATIENT MESSAGE (OUTPATIENT)
Dept: PRIMARY CARE CLINIC | Facility: CLINIC | Age: 36
End: 2021-12-13
Payer: COMMERCIAL

## 2021-12-13 DIAGNOSIS — R04.0 EPISTAXIS: Primary | ICD-10-CM

## 2021-12-27 ENCOUNTER — PATIENT MESSAGE (OUTPATIENT)
Dept: PRIMARY CARE CLINIC | Facility: CLINIC | Age: 36
End: 2021-12-27
Payer: COMMERCIAL

## 2022-01-03 DIAGNOSIS — Z01.818 PRE-OP TESTING: ICD-10-CM

## 2022-01-14 ENCOUNTER — PATIENT OUTREACH (OUTPATIENT)
Dept: ADMINISTRATIVE | Facility: OTHER | Age: 37
End: 2022-01-14
Payer: COMMERCIAL

## 2022-01-14 NOTE — PROGRESS NOTES
LINKS immunization registry updated  Care Everywhere updated  Health Maintenance updated  Chart reviewed for overdue Proactive [facility] Encounters ([facility]UNG) health maintenance testing (CRS, Breast Ca, Diabetic Eye Exam)   Orders entered:N/A

## 2022-02-18 ENCOUNTER — PATIENT MESSAGE (OUTPATIENT)
Dept: PRIMARY CARE CLINIC | Facility: CLINIC | Age: 37
End: 2022-02-18
Payer: COMMERCIAL

## 2022-02-22 ENCOUNTER — OFFICE VISIT (OUTPATIENT)
Dept: PRIMARY CARE CLINIC | Facility: CLINIC | Age: 37
End: 2022-02-22
Payer: COMMERCIAL

## 2022-02-22 VITALS
SYSTOLIC BLOOD PRESSURE: 94 MMHG | HEIGHT: 60 IN | BODY MASS INDEX: 22.51 KG/M2 | WEIGHT: 114.63 LBS | HEART RATE: 80 BPM | DIASTOLIC BLOOD PRESSURE: 70 MMHG | OXYGEN SATURATION: 98 % | RESPIRATION RATE: 18 BRPM

## 2022-02-22 DIAGNOSIS — Z87.442 HISTORY OF NEPHROLITHIASIS: ICD-10-CM

## 2022-02-22 DIAGNOSIS — Z90.710 HISTORY OF ABDOMINAL HYSTERECTOMY: ICD-10-CM

## 2022-02-22 DIAGNOSIS — H65.02 ACUTE SEROUS OTITIS MEDIA OF LEFT EAR, RECURRENCE NOT SPECIFIED: Primary | ICD-10-CM

## 2022-02-22 PROBLEM — N20.0 NEPHROLITHIASIS: Status: RESOLVED | Noted: 2019-02-19 | Resolved: 2022-02-22

## 2022-02-22 PROCEDURE — 99999 PR PBB SHADOW E&M-EST. PATIENT-LVL III: CPT | Mod: PBBFAC,,, | Performed by: FAMILY MEDICINE

## 2022-02-22 PROCEDURE — 99999 PR PBB SHADOW E&M-EST. PATIENT-LVL III: ICD-10-PCS | Mod: PBBFAC,,, | Performed by: FAMILY MEDICINE

## 2022-02-22 PROCEDURE — 3074F PR MOST RECENT SYSTOLIC BLOOD PRESSURE < 130 MM HG: ICD-10-PCS | Mod: CPTII,S$GLB,, | Performed by: FAMILY MEDICINE

## 2022-02-22 PROCEDURE — 3008F BODY MASS INDEX DOCD: CPT | Mod: CPTII,S$GLB,, | Performed by: FAMILY MEDICINE

## 2022-02-22 PROCEDURE — 1159F MED LIST DOCD IN RCRD: CPT | Mod: CPTII,S$GLB,, | Performed by: FAMILY MEDICINE

## 2022-02-22 PROCEDURE — 3008F PR BODY MASS INDEX (BMI) DOCUMENTED: ICD-10-PCS | Mod: CPTII,S$GLB,, | Performed by: FAMILY MEDICINE

## 2022-02-22 PROCEDURE — 3074F SYST BP LT 130 MM HG: CPT | Mod: CPTII,S$GLB,, | Performed by: FAMILY MEDICINE

## 2022-02-22 PROCEDURE — 3078F PR MOST RECENT DIASTOLIC BLOOD PRESSURE < 80 MM HG: ICD-10-PCS | Mod: CPTII,S$GLB,, | Performed by: FAMILY MEDICINE

## 2022-02-22 PROCEDURE — 3078F DIAST BP <80 MM HG: CPT | Mod: CPTII,S$GLB,, | Performed by: FAMILY MEDICINE

## 2022-02-22 PROCEDURE — 99213 PR OFFICE/OUTPT VISIT, EST, LEVL III, 20-29 MIN: ICD-10-PCS | Mod: S$GLB,,, | Performed by: FAMILY MEDICINE

## 2022-02-22 PROCEDURE — 1159F PR MEDICATION LIST DOCUMENTED IN MEDICAL RECORD: ICD-10-PCS | Mod: CPTII,S$GLB,, | Performed by: FAMILY MEDICINE

## 2022-02-22 PROCEDURE — 99213 OFFICE O/P EST LOW 20 MIN: CPT | Mod: S$GLB,,, | Performed by: FAMILY MEDICINE

## 2022-02-22 RX ORDER — AZITHROMYCIN 250 MG/1
TABLET, FILM COATED ORAL
Qty: 6 TABLET | Refills: 0 | Status: SHIPPED | OUTPATIENT
Start: 2022-02-22 | End: 2022-02-26

## 2022-02-22 RX ORDER — LORATADINE 10 MG/1
10 TABLET ORAL DAILY
Qty: 30 TABLET | Refills: 5 | Status: SHIPPED | OUTPATIENT
Start: 2022-02-22 | End: 2022-04-25

## 2022-02-22 RX ORDER — PREDNISONE 5 MG/1
TABLET ORAL
Qty: 20 TABLET | Refills: 0 | Status: SHIPPED | OUTPATIENT
Start: 2022-02-22 | End: 2022-04-25

## 2022-02-22 NOTE — PROGRESS NOTES
Subjective:       Patient ID: Shena Gomez is a 36 y.o. female.    Chief Complaint: No chief complaint on file.    HPI:  36-year-old female in for ear pain--x3 week--no fever--+runny stuffy nose--no sore throat but postnasal drip--no cough--left year pain--yesterday put Q-tip in the ear and had a little blood.  No history of ear problems in the past.  Does not recall any water being unit--cracking a lot.  Normal hearing     ROS:  Skin: no psoriasis, eczema, skin cancer  HEENT: No headache, ocular pain, blurred vision, diplopia, epistaxis, hoarseness change in voice, thyroid trouble  Lung: No pneumonia, asthma, Tb, wheezing, SOB, no smoking  Heart: No chest pain, ankle edema, palpitations, MI, johana murmur, hypertension, hyperlipidemia  Abdomen: No nausea, vomiting, diarrhea, constipation, ulcers, hepatitis, gallbladder disease, melena, hematochezia, hematemesis  : no UTI, renal disease, no  nephrolithiasis  GYN hysterectomy due fibroids   MS: no fractures, O/A, lupus, rheumatoid, gout  Neuro: No dizziness, LOC, seizures   No diabetes, no anemia, no anxiety, no depression   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     Objective:   Physical Exam:  General: Well nourished, well developed, no acute distress  Skin: No lesions  HEENT: Eyes PERRLA, EOM intact, nose clear discharge--mild pressure in the frontal and maxillary area, throat +1/4 erythematous ear small amount of fluid behind the left tympanic membrane--ear canals normal no evidence of blood the patient had bright red blood on Q-tip last night   NECK: Supple, no bruits, No JVD, no nodes  Lungs: Clear, no rales, rhonchi, wheezing  Heart: Regular rate and rhythm, no murmurs, gallops, or rubs  Abdomen: flat, bowel sounds positive, no tenderness, or organomegaly  MS: Range of motion and muscle strength intact  Neuro: Alert, CN intact, oriented X 3  Extremities: No cyanosis, clubbing, or edema         Assessment:       1. Acute serous  otitis media of left ear, recurrence not specified    2. History of nephrolithiasis    3. History of abdominal hysterectomy        Plan:       Acute serous otitis media of left ear, recurrence not specified    History of nephrolithiasis    History of abdominal hysterectomy    Other orders  -     azithromycin (Z-GEE) 250 MG tablet; 2 tabs by mouth day 1, then 1 tab by mouth daily x 4 days  Dispense: 6 tablet; Refill: 0  -     predniSONE (DELTASONE) 5 MG tablet; 4 po qd x 2, 3 po qd x2, 2 po qd x2, 1 po qd x2  Dispense: 20 tablet; Refill: 0  -     loratadine (CLARITIN) 10 mg tablet; Take 1 tablet (10 mg total) by mouth once daily.  Dispense: 30 tablet; Refill: 5        Main Reason for Visit--left earache--left serous otitis media--some pharyngitis--Zithromax/prednisone taper/Claritin  History nephrolithiasis/history hysterectomy history anxiety  Lab done January 2021  No need to do now   Health maintenance hepatitis C

## 2022-02-24 DIAGNOSIS — F41.1 GAD (GENERALIZED ANXIETY DISORDER): ICD-10-CM

## 2022-02-24 NOTE — TELEPHONE ENCOUNTER
No new care gaps identified.  Powered by [employer]. Reference number: [id number].   2/24/2022 1:30:26 AM CST

## 2022-02-25 RX ORDER — FLUOXETINE 10 MG/1
CAPSULE ORAL
Qty: 90 CAPSULE | Refills: 0 | Status: SHIPPED | OUTPATIENT
Start: 2022-02-25 | End: 2022-04-25

## 2022-03-22 ENCOUNTER — PATIENT MESSAGE (OUTPATIENT)
Dept: PRIMARY CARE CLINIC | Facility: CLINIC | Age: 37
End: 2022-03-22
Payer: COMMERCIAL

## 2022-04-20 ENCOUNTER — PATIENT MESSAGE (OUTPATIENT)
Dept: PRIMARY CARE CLINIC | Facility: CLINIC | Age: 37
End: 2022-04-20
Payer: COMMERCIAL

## 2022-04-21 ENCOUNTER — PATIENT OUTREACH (OUTPATIENT)
Dept: ADMINISTRATIVE | Facility: HOSPITAL | Age: 37
End: 2022-04-21
Payer: COMMERCIAL

## 2022-04-25 ENCOUNTER — PATIENT MESSAGE (OUTPATIENT)
Dept: PRIMARY CARE CLINIC | Facility: CLINIC | Age: 37
End: 2022-04-25

## 2022-04-25 ENCOUNTER — OFFICE VISIT (OUTPATIENT)
Dept: PRIMARY CARE CLINIC | Facility: CLINIC | Age: 37
End: 2022-04-25
Payer: COMMERCIAL

## 2022-04-25 VITALS
BODY MASS INDEX: 22.7 KG/M2 | HEIGHT: 60 IN | OXYGEN SATURATION: 96 % | DIASTOLIC BLOOD PRESSURE: 78 MMHG | TEMPERATURE: 98 F | WEIGHT: 115.63 LBS | HEART RATE: 122 BPM | RESPIRATION RATE: 16 BRPM | SYSTOLIC BLOOD PRESSURE: 118 MMHG

## 2022-04-25 DIAGNOSIS — M54.12 CERVICAL RADICULOPATHY: Primary | ICD-10-CM

## 2022-04-25 DIAGNOSIS — J30.2 SEASONAL ALLERGIC RHINITIS, UNSPECIFIED TRIGGER: ICD-10-CM

## 2022-04-25 DIAGNOSIS — F41.9 ANXIETY: ICD-10-CM

## 2022-04-25 DIAGNOSIS — G43.829 MENSTRUAL MIGRAINE WITHOUT STATUS MIGRAINOSUS, NOT INTRACTABLE: ICD-10-CM

## 2022-04-25 DIAGNOSIS — F32.81 PMDD (PREMENSTRUAL DYSPHORIC DISORDER): ICD-10-CM

## 2022-04-25 PROCEDURE — 3074F SYST BP LT 130 MM HG: CPT | Mod: CPTII,S$GLB,, | Performed by: NURSE PRACTITIONER

## 2022-04-25 PROCEDURE — 99999 PR PBB SHADOW E&M-EST. PATIENT-LVL III: CPT | Mod: PBBFAC,,, | Performed by: NURSE PRACTITIONER

## 2022-04-25 PROCEDURE — 3078F PR MOST RECENT DIASTOLIC BLOOD PRESSURE < 80 MM HG: ICD-10-PCS | Mod: CPTII,S$GLB,, | Performed by: NURSE PRACTITIONER

## 2022-04-25 PROCEDURE — 99214 OFFICE O/P EST MOD 30 MIN: CPT | Mod: S$GLB,,, | Performed by: NURSE PRACTITIONER

## 2022-04-25 PROCEDURE — 3078F DIAST BP <80 MM HG: CPT | Mod: CPTII,S$GLB,, | Performed by: NURSE PRACTITIONER

## 2022-04-25 PROCEDURE — 3008F BODY MASS INDEX DOCD: CPT | Mod: CPTII,S$GLB,, | Performed by: NURSE PRACTITIONER

## 2022-04-25 PROCEDURE — 3008F PR BODY MASS INDEX (BMI) DOCUMENTED: ICD-10-PCS | Mod: CPTII,S$GLB,, | Performed by: NURSE PRACTITIONER

## 2022-04-25 PROCEDURE — 3074F PR MOST RECENT SYSTOLIC BLOOD PRESSURE < 130 MM HG: ICD-10-PCS | Mod: CPTII,S$GLB,, | Performed by: NURSE PRACTITIONER

## 2022-04-25 PROCEDURE — 99214 PR OFFICE/OUTPT VISIT, EST, LEVL IV, 30-39 MIN: ICD-10-PCS | Mod: S$GLB,,, | Performed by: NURSE PRACTITIONER

## 2022-04-25 PROCEDURE — 99999 PR PBB SHADOW E&M-EST. PATIENT-LVL III: ICD-10-PCS | Mod: PBBFAC,,, | Performed by: NURSE PRACTITIONER

## 2022-04-25 RX ORDER — ESCITALOPRAM OXALATE 5 MG/1
5 TABLET ORAL DAILY
Qty: 30 TABLET | Refills: 11 | Status: SHIPPED | OUTPATIENT
Start: 2022-04-25 | End: 2022-08-13 | Stop reason: SDUPTHER

## 2022-04-25 RX ORDER — LEVOCETIRIZINE DIHYDROCHLORIDE 5 MG/1
5 TABLET, FILM COATED ORAL NIGHTLY
Qty: 30 TABLET | Refills: 11 | Status: ON HOLD | OUTPATIENT
Start: 2022-04-25 | End: 2022-10-05 | Stop reason: CLARIF

## 2022-04-25 RX ORDER — SUMATRIPTAN 50 MG/1
50 TABLET, FILM COATED ORAL
Qty: 30 TABLET | Refills: 0 | Status: SHIPPED | OUTPATIENT
Start: 2022-04-25 | End: 2023-08-01

## 2022-04-25 NOTE — PROGRESS NOTES
"Chief Complaint  Chief Complaint   Patient presents with    Follow-up    Medication Problem       HPI    Premenstrual dysphoric disorder- Patient treated with fluoxetine for PMDD several years ago has been on prozac 10 mg daily. Initially it did help with her symptoms but recently she has noticed that she is easily fatigued. Takes naps daily because she is generally fatigued. Experienced a feeling of impending doom related to anxiety over the past few months. Sleeps well at night though it is restless and interrupted. Is concerned that the medication is causing her uncomfortable sleep. During the days she is feeling amotivated and drowsy. The medication has helped her OCD and she is feeling like she doesn't worry as much.  Denies any suicidal homicidal ideation.    Cervical radiculopathy-Continues with trouble with her "pinched nerve" on the left side of her neck. Onset several years ago. Shoots down from behind her ear radiating down her left neck. Associated left upper extremity numbness which has improved. Has been trying flexeril 5 mg daily which does help but patient is interested in physical therapy at this time.     Allergic rhinitis-Has tried allegra and claritin for allergies. Has a stuffy nose and nasal pressure and headache. No cough. Has not been allergy tested in the past.         PAST MEDICAL HISTORY:  Past Medical History:   Diagnosis Date    Anxiety     Chronic sinusitis     Epigastric pain     Kidney stones     history    Nephrolithiasis     OAB (overactive bladder)        PAST SURGICAL HISTORY:  Past Surgical History:   Procedure Laterality Date    CERVICAL CERCLAGE  2013     SECTION      CYSTOSCOPY N/A 2019    Procedure: CYSTOSCOPY;  Surgeon: Yokasta John MD;  Location: [facility];  Service: OB/GYN;  Laterality: N/A;    ESOPHAGOGASTRODUODENOSCOPY N/A 2019    Procedure: EGD (ESOPHAGOGASTRODUODENOSCOPY);  Surgeon: Ferdinand Cooney MD;  Location: " [facility];  Service: General;  Laterality: N/A;    HYSTERECTOMY      LAPAROSCOPIC SALPINGECTOMY Bilateral 2019    Procedure: SALPINGECTOMY, LAPAROSCOPIC;  Surgeon: Yokasta John MD;  Location: [facility] OR;  Service: OB/GYN;  Laterality: Bilateral;    LAPAROSCOPIC TOTAL HYSTERECTOMY N/A 2019    Procedure: HYSTERECTOMY, TOTAL, LAPAROSCOPIC;  Surgeon: Yokasta John MD;  Location: [facility] OR;  Service: OB/GYN;  Laterality: N/A;    TUBAL LIGATION  14       SOCIAL HISTORY:  Social History     Socioeconomic History    Marital status:    Tobacco Use    Smoking status: Never Smoker    Smokeless tobacco: Never Used   Substance and Sexual Activity    Alcohol use: Never     Alcohol/week: 0.0 standard drinks    Drug use: Never    Sexual activity: Yes     Partners: Male     Birth control/protection: Other-see comments, See Surgical Hx     Comment: Tubal       FAMILY HISTORY:  Family History   Problem Relation Age of Onset    Breast cancer Maternal Grandmother 68    Breast cancer Maternal Aunt 56    Esophageal cancer Maternal Aunt     Cancer Neg Hx     Colon cancer Neg Hx     Diabetes Neg Hx     Eclampsia Neg Hx     Miscarriages / Stillbirths Neg Hx     Hypertension Neg Hx     Ovarian cancer Neg Hx      labor Neg Hx     Stroke Neg Hx        ALLERGIES AND MEDICATIONS: updated and reviewed.  Review of patient's allergies indicates:   Allergen Reactions    Celestone [betamethasone] Palpitations     Current Outpatient Medications   Medication Sig Dispense Refill    EScitalopram oxalate (LEXAPRO) 5 MG Tab Take 1 tablet (5 mg total) by mouth once daily. 30 tablet 11    INTRAROSA 6.5 mg Inst       levocetirizine (XYZAL) 5 MG tablet Take 1 tablet (5 mg total) by mouth every evening. 30 tablet 11    sumatriptan (IMITREX) 50 MG tablet Take 1 tablet (50 mg total) by mouth every 2 (two) hours as needed for Migraine (max 200 mg). 30 tablet 0     No current  facility-administered medications for this visit.         ROS  Review of Systems   Constitutional: Positive for fatigue. Negative for chills and fever.   HENT: Negative for ear pain, postnasal drip and sinus pain.    Respiratory: Negative for cough and shortness of breath.    Cardiovascular: Negative for chest pain.   Gastrointestinal: Negative for diarrhea, nausea and vomiting.   Musculoskeletal: Positive for neck pain and neck stiffness.   Allergic/Immunologic: Positive for environmental allergies.   Neurological: Positive for headaches.   Psychiatric/Behavioral: Positive for sleep disturbance (Excessive sleeping). Negative for confusion and dysphoric mood. The patient is nervous/anxious.            PHYSICAL EXAM  Vitals:    04/25/22 1416   BP: 118/78   BP Location: Left arm   Patient Position: Sitting   BP Method: Medium (Manual)   Pulse: (!) 122   Resp: 16   Temp: 98.3 °F (36.8 °C)   TempSrc: Oral   SpO2: 96%   Weight: 52.4 kg (115 lb 10.1 oz)   Height: 5' (1.524 m)    Body mass index is 22.58 kg/m².  Weight: 52.4 kg (115 lb 10.1 oz)   Height: 5' (152.4 cm)     Physical Exam  Constitutional:       Appearance: She is well-developed.   HENT:      Head: Normocephalic.      Right Ear: Tympanic membrane normal.      Left Ear: Tympanic membrane normal.      Mouth/Throat:      Pharynx: Uvula midline.   Eyes:      Conjunctiva/sclera: Conjunctivae normal.   Cardiovascular:      Rate and Rhythm: Normal rate and regular rhythm.      Pulses: Normal pulses.           Radial pulses are 2+ on the right side and 2+ on the left side.      Heart sounds: Normal heart sounds. No murmur heard.     Comments: No LE swelling noted  Pulmonary:      Effort: Pulmonary effort is normal.      Breath sounds: Normal breath sounds. No wheezing.   Abdominal:      General: Bowel sounds are normal.      Palpations: Abdomen is soft.      Tenderness: There is no abdominal tenderness.   Lymphadenopathy:      Cervical: No cervical adenopathy.    Skin:     General: Skin is warm and dry.      Findings: No rash.   Neurological:      Mental Status: She is alert and oriented to person, place, and time.           Health Maintenance       Date Due Completion Date    Hepatitis C Screening Never done ---    Lipid Panel 01/20/2022 1/20/2021    Influenza Vaccine (1) 06/30/2022 (Originally 9/1/2021) 11/16/2020    COVID-19 Vaccine (1) 02/22/2023 (Originally [birth year]1991) ---    TETANUS VACCINE 06/11/2030 6/11/2020            Assessment & Plan    Problem List Items Addressed This Visit        Unprioritized    Anxiety    Relevant Medications    EScitalopram oxalate (LEXAPRO) 5 MG Tab  Trial of low-dose Lexapro.  Follow-up in 4 weeks to evaluate for improvement      Other Visit Diagnoses     Cervical radiculopathy    -  Primary    Relevant Orders    Ambulatory referral/consult to Physical/Occupational Therapy  Patient not interested in pharmaceutical intervention at this time.    Seasonal allergic rhinitis, unspecified trigger        Relevant Medications    levocetirizine (XYZAL) 5 MG tablet    Menstrual migraine without status migrainosus, not intractable        Relevant Medications    sumatriptan (IMITREX) 50 MG tablet  Trial of Imitrex for menstrual migraines      PMDD (premenstrual dysphoric disorder)        Relevant Medications    EScitalopram oxalate (LEXAPRO) 5 MG Tab          Follow-up: Follow up if symptoms worsen or fail to improve.    Marisel Mcdaniels    Medication List with Changes/Refills   New Medications    ESCITALOPRAM OXALATE (LEXAPRO) 5 MG TAB    Take 1 tablet (5 mg total) by mouth once daily.    LEVOCETIRIZINE (XYZAL) 5 MG TABLET    Take 1 tablet (5 mg total) by mouth every evening.    SUMATRIPTAN (IMITREX) 50 MG TABLET    Take 1 tablet (50 mg total) by mouth every 2 (two) hours as needed for Migraine (max 200 mg).   Current Medications    INTRAROSA 6.5 MG INST       Discontinued Medications    FLUOXETINE 10 MG CAPSULE    TAKE 1 CAPSULE BY MOUTH EVERY DAY     LORATADINE (CLARITIN) 10 MG TABLET    Take 1 tablet (10 mg total) by mouth once daily.    PREDNISONE (DELTASONE) 5 MG TABLET    4 po qd x 2, 3 po qd x2, 2 po qd x2, 1 po qd x2

## 2022-05-19 ENCOUNTER — PATIENT MESSAGE (OUTPATIENT)
Dept: OBSTETRICS AND GYNECOLOGY | Facility: CLINIC | Age: 37
End: 2022-05-19
Payer: COMMERCIAL

## 2022-05-24 ENCOUNTER — OFFICE VISIT (OUTPATIENT)
Dept: OBSTETRICS AND GYNECOLOGY | Facility: CLINIC | Age: 37
End: 2022-05-24
Payer: COMMERCIAL

## 2022-05-24 VITALS
DIASTOLIC BLOOD PRESSURE: 70 MMHG | SYSTOLIC BLOOD PRESSURE: 114 MMHG | HEIGHT: 60 IN | WEIGHT: 118.06 LBS | BODY MASS INDEX: 23.18 KG/M2

## 2022-05-24 DIAGNOSIS — L02.32 BOIL OF BUTTOCK: Primary | ICD-10-CM

## 2022-05-24 PROCEDURE — 3074F SYST BP LT 130 MM HG: CPT | Mod: CPTII,S$GLB,, | Performed by: OBSTETRICS & GYNECOLOGY

## 2022-05-24 PROCEDURE — 99999 PR PBB SHADOW E&M-EST. PATIENT-LVL III: CPT | Mod: PBBFAC,,, | Performed by: OBSTETRICS & GYNECOLOGY

## 2022-05-24 PROCEDURE — 99213 OFFICE O/P EST LOW 20 MIN: CPT | Mod: S$GLB,,, | Performed by: OBSTETRICS & GYNECOLOGY

## 2022-05-24 PROCEDURE — 1159F MED LIST DOCD IN RCRD: CPT | Mod: CPTII,S$GLB,, | Performed by: OBSTETRICS & GYNECOLOGY

## 2022-05-24 PROCEDURE — 99213 PR OFFICE/OUTPT VISIT, EST, LEVL III, 20-29 MIN: ICD-10-PCS | Mod: S$GLB,,, | Performed by: OBSTETRICS & GYNECOLOGY

## 2022-05-24 PROCEDURE — 1160F RVW MEDS BY RX/DR IN RCRD: CPT | Mod: CPTII,S$GLB,, | Performed by: OBSTETRICS & GYNECOLOGY

## 2022-05-24 PROCEDURE — 99999 PR PBB SHADOW E&M-EST. PATIENT-LVL III: ICD-10-PCS | Mod: PBBFAC,,, | Performed by: OBSTETRICS & GYNECOLOGY

## 2022-05-24 PROCEDURE — 3078F DIAST BP <80 MM HG: CPT | Mod: CPTII,S$GLB,, | Performed by: OBSTETRICS & GYNECOLOGY

## 2022-05-24 PROCEDURE — 3008F PR BODY MASS INDEX (BMI) DOCUMENTED: ICD-10-PCS | Mod: CPTII,S$GLB,, | Performed by: OBSTETRICS & GYNECOLOGY

## 2022-05-24 PROCEDURE — 3008F BODY MASS INDEX DOCD: CPT | Mod: CPTII,S$GLB,, | Performed by: OBSTETRICS & GYNECOLOGY

## 2022-05-24 PROCEDURE — 1160F PR REVIEW ALL MEDS BY PRESCRIBER/CLIN PHARMACIST DOCUMENTED: ICD-10-PCS | Mod: CPTII,S$GLB,, | Performed by: OBSTETRICS & GYNECOLOGY

## 2022-05-24 PROCEDURE — 3078F PR MOST RECENT DIASTOLIC BLOOD PRESSURE < 80 MM HG: ICD-10-PCS | Mod: CPTII,S$GLB,, | Performed by: OBSTETRICS & GYNECOLOGY

## 2022-05-24 PROCEDURE — 1159F PR MEDICATION LIST DOCUMENTED IN MEDICAL RECORD: ICD-10-PCS | Mod: CPTII,S$GLB,, | Performed by: OBSTETRICS & GYNECOLOGY

## 2022-05-24 PROCEDURE — 3074F PR MOST RECENT SYSTOLIC BLOOD PRESSURE < 130 MM HG: ICD-10-PCS | Mod: CPTII,S$GLB,, | Performed by: OBSTETRICS & GYNECOLOGY

## 2022-05-24 RX ORDER — AMOXICILLIN AND CLAVULANATE POTASSIUM 875; 125 MG/1; MG/1
1 TABLET, FILM COATED ORAL 2 TIMES DAILY
Qty: 14 TABLET | Refills: 0 | Status: SHIPPED | OUTPATIENT
Start: 2022-05-24 | End: 2022-05-31

## 2022-05-25 NOTE — PROGRESS NOTES
GYN follow up  2022    Chief Complaint   Patient presents with    Follow-up         HISTORY OF PRESENT ILLNESS:  Pt is a  36 y.o.  alert female who presents today for GYN follow up for evaluation of right buttocks lesion.   She notes she went to a tanning bed about 2 weeks ago. Shortly after that she noted a painful bump on her right buttocks. She denies fevers. She has tried hibiclens and muporicin cream.   She notes that it seems to be now getting a little less painful and smaller but is still present.   No other concerns. Denies vaginal discharge and vaginal bleeding. She does endorse some vaginal dryness during intercourse but has improvement with over the counter lubricants.     Patient's last menstrual period was 2019.    Review of patient's allergies indicates:   Allergen Reactions    Celestone [betamethasone] Palpitations       Current Outpatient Medications on File Prior to Visit   Medication Sig Dispense Refill    EScitalopram oxalate (LEXAPRO) 5 MG Tab Take 1 tablet (5 mg total) by mouth once daily. 30 tablet 11    levocetirizine (XYZAL) 5 MG tablet Take 1 tablet (5 mg total) by mouth every evening. 30 tablet 11    INTRAROSA 6.5 mg Inst       sumatriptan (IMITREX) 50 MG tablet Take 1 tablet (50 mg total) by mouth every 2 (two) hours as needed for Migraine (max 200 mg). (Patient not taking: Reported on 2022) 30 tablet 0     No current facility-administered medications on file prior to visit.       Past Medical History:   Diagnosis Date    Anxiety     Chronic sinusitis     Epigastric pain     Kidney stones     history    Nephrolithiasis     OAB (overactive bladder)             Past Surgical History:   Procedure Laterality Date    CERVICAL CERCLAGE  2013     SECTION      CYSTOSCOPY N/A 2019    Procedure: CYSTOSCOPY;  Surgeon: Yokasta John MD;  Location: [facility];  Service: OB/GYN;  Laterality: N/A;    ESOPHAGOGASTRODUODENOSCOPY N/A 2019     Procedure: EGD (ESOPHAGOGASTRODUODENOSCOPY);  Surgeon: Ferdinand Cooney MD;  Location: [facility];  Service: General;  Laterality: N/A;    HYSTERECTOMY      LAPAROSCOPIC SALPINGECTOMY Bilateral 2019    Procedure: SALPINGECTOMY, LAPAROSCOPIC;  Surgeon: Yokasta John MD;  Location: [facility];  Service: OB/GYN;  Laterality: Bilateral;    LAPAROSCOPIC TOTAL HYSTERECTOMY N/A 2019    Procedure: HYSTERECTOMY, TOTAL, LAPAROSCOPIC;  Surgeon: Yokasta John MD;  Location: [facility];  Service: OB/GYN;  Laterality: N/A;    TUBAL LIGATION  14       Social History     Socioeconomic History    Marital status:    Tobacco Use    Smoking status: Never Smoker    Smokeless tobacco: Never Used   Substance and Sexual Activity    Alcohol use: Never     Alcohol/week: 0.0 standard drinks    Drug use: Never    Sexual activity: Yes     Partners: Male     Birth control/protection: Other-see comments, See Surgical Hx     Comment: Tubal                     Family History   Problem Relation Age of Onset    Breast cancer Maternal Grandmother 68    Breast cancer Maternal Aunt 56    Esophageal cancer Maternal Aunt     Cancer Neg Hx     Colon cancer Neg Hx     Diabetes Neg Hx     Eclampsia Neg Hx     Miscarriages / Stillbirths Neg Hx     Hypertension Neg Hx     Ovarian cancer Neg Hx      labor Neg Hx     Stroke Neg Hx        OB History    Para Term  AB Living   5 4 3 1 1 3   SAB IAB Ectopic Multiple Live Births   1       1      # Outcome Date GA Lbr Reginald/2nd Weight Sex Delivery Anes PTL Lv   5 Term 14 39w1d  3.195 kg (7 lb 0.7 oz) F CS-LTranv Spinal N GUI   4  2012 22w0d  0.709 kg (1 lb 9 oz) M       3 Term 09/10/08 38w0d  3.629 kg (8 lb) M CS-LTranv EPI     2 Term 05 42w0d  3.345 kg (7 lb 6 oz) M CS-LTranv EPI     1 SAB              REVIEW OF SYSTEMS:  Negative except as above    PHYSICAL EXAM  /70 (BP Location: Right arm, Patient  Position: Sitting)   Ht 5' (1.524 m)   Wt 53.5 kg (118 lb 0.9 oz)   LMP 2019   BMI 23.06 kg/m²   GENERAL APPEARANCE:  The patient is a pleasant, normal appearing female with normal affect and in no distress.  :  Vulva: Inspection of her external genitalia reveals normal mons pubis, labia minora and labia majora.  Normal appearing clitoris, urethral meatus and [place]'s glands.    Perineum:  Perineum appears normal.  Anus:  Normal with no apparent lesions.   Buttocks: Right buttocks has a 1.5 x 1.5 cm firm mildly erythematous furuncle. No fluctuance. No head is noted on the furuncle.    ASSESSMENT/PLAN:  Pt is a  36 y.o.  alert female who presents today for GYN follow up for evaluation of right buttocks furuncle.    Right buttocks furuncle: No fluctuance. Not amenable to incision and drainage. Recommended Augmentin bid x 7 days. Continue warm compress two to three times daily for 10 minutes.   Follow up if worsening symptoms or no improvement.       Escobar Dennis  2022

## 2022-06-06 ENCOUNTER — PATIENT MESSAGE (OUTPATIENT)
Dept: OBSTETRICS AND GYNECOLOGY | Facility: CLINIC | Age: 37
End: 2022-06-06
Payer: COMMERCIAL

## 2022-06-06 RX ORDER — SULFAMETHOXAZOLE AND TRIMETHOPRIM 800; 160 MG/1; MG/1
1 TABLET ORAL 2 TIMES DAILY
Qty: 14 TABLET | Refills: 0 | Status: SHIPPED | OUTPATIENT
Start: 2022-06-06 | End: 2022-06-13

## 2022-07-13 DIAGNOSIS — Z00.00 ROUTINE CHECK-UP: ICD-10-CM

## 2022-07-13 DIAGNOSIS — F41.1 GAD (GENERALIZED ANXIETY DISORDER): ICD-10-CM

## 2022-07-13 DIAGNOSIS — Z11.59 NEED FOR HEPATITIS C SCREENING TEST: Primary | ICD-10-CM

## 2022-07-13 NOTE — TELEPHONE ENCOUNTER
No new care gaps identified.  [facility] Embedded Care Gaps. Reference number: [id number]. 7/13/2022   12:15:03 AM CDT

## 2022-07-14 ENCOUNTER — PATIENT MESSAGE (OUTPATIENT)
Dept: PRIMARY CARE CLINIC | Facility: CLINIC | Age: 37
End: 2022-07-14
Payer: COMMERCIAL

## 2022-07-14 ENCOUNTER — PATIENT MESSAGE (OUTPATIENT)
Dept: OBSTETRICS AND GYNECOLOGY | Facility: CLINIC | Age: 37
End: 2022-07-14
Payer: COMMERCIAL

## 2022-07-14 RX ORDER — FLUOXETINE 10 MG/1
CAPSULE ORAL
Qty: 90 CAPSULE | Refills: 0 | OUTPATIENT
Start: 2022-07-14

## 2022-07-15 ENCOUNTER — PATIENT MESSAGE (OUTPATIENT)
Dept: PRIMARY CARE CLINIC | Facility: CLINIC | Age: 37
End: 2022-07-15
Payer: COMMERCIAL

## 2022-07-15 DIAGNOSIS — R53.82 CHRONIC FATIGUE: Primary | ICD-10-CM

## 2022-07-26 ENCOUNTER — PATIENT MESSAGE (OUTPATIENT)
Dept: PRIMARY CARE CLINIC | Facility: CLINIC | Age: 37
End: 2022-07-26
Payer: COMMERCIAL

## 2022-07-27 ENCOUNTER — PATIENT MESSAGE (OUTPATIENT)
Dept: PRIMARY CARE CLINIC | Facility: CLINIC | Age: 37
End: 2022-07-27
Payer: COMMERCIAL

## 2022-07-27 DIAGNOSIS — E88.09 HYPOALBUMINEMIA: Primary | ICD-10-CM

## 2022-07-28 NOTE — TELEPHONE ENCOUNTER
Either her liver don't make enough albumin or she can loose it in kidney or intestine any diarrhea or malnutrition or on low protein diet? need to do U/A at hospital

## 2022-07-28 NOTE — TELEPHONE ENCOUNTER
No need for concern try to be on balance diet and repeat CMP in 1 month and check Na in 1 month too

## 2022-08-03 ENCOUNTER — PATIENT MESSAGE (OUTPATIENT)
Dept: OBSTETRICS AND GYNECOLOGY | Facility: CLINIC | Age: 37
End: 2022-08-03
Payer: COMMERCIAL

## 2022-08-12 ENCOUNTER — PATIENT MESSAGE (OUTPATIENT)
Dept: PRIMARY CARE CLINIC | Facility: CLINIC | Age: 37
End: 2022-08-12
Payer: COMMERCIAL

## 2022-08-12 DIAGNOSIS — F32.81 PMDD (PREMENSTRUAL DYSPHORIC DISORDER): ICD-10-CM

## 2022-08-12 DIAGNOSIS — F41.9 ANXIETY: ICD-10-CM

## 2022-08-13 RX ORDER — ESCITALOPRAM OXALATE 10 MG/1
10 TABLET ORAL DAILY
Qty: 30 TABLET | Refills: 11 | Status: SHIPPED | OUTPATIENT
Start: 2022-08-13 | End: 2022-09-20

## 2022-08-22 ENCOUNTER — PATIENT MESSAGE (OUTPATIENT)
Dept: OBSTETRICS AND GYNECOLOGY | Facility: CLINIC | Age: 37
End: 2022-08-22
Payer: COMMERCIAL

## 2022-08-28 ENCOUNTER — PATIENT MESSAGE (OUTPATIENT)
Dept: PRIMARY CARE CLINIC | Facility: CLINIC | Age: 37
End: 2022-08-28
Payer: COMMERCIAL

## 2022-08-29 ENCOUNTER — PATIENT MESSAGE (OUTPATIENT)
Dept: PRIMARY CARE CLINIC | Facility: CLINIC | Age: 37
End: 2022-08-29
Payer: COMMERCIAL

## 2022-08-31 ENCOUNTER — PATIENT MESSAGE (OUTPATIENT)
Dept: PRIMARY CARE CLINIC | Facility: CLINIC | Age: 37
End: 2022-08-31
Payer: COMMERCIAL

## 2022-09-06 ENCOUNTER — PATIENT MESSAGE (OUTPATIENT)
Dept: PRIMARY CARE CLINIC | Facility: CLINIC | Age: 37
End: 2022-09-06
Payer: COMMERCIAL

## 2022-09-06 RX ORDER — FLUOXETINE 10 MG/1
10 CAPSULE ORAL DAILY
Qty: 30 CAPSULE | Refills: 11 | Status: SHIPPED | OUTPATIENT
Start: 2022-09-06 | End: 2023-09-18

## 2022-09-15 ENCOUNTER — PATIENT MESSAGE (OUTPATIENT)
Dept: OBSTETRICS AND GYNECOLOGY | Facility: CLINIC | Age: 37
End: 2022-09-15
Payer: COMMERCIAL

## 2022-09-20 ENCOUNTER — OFFICE VISIT (OUTPATIENT)
Dept: OBSTETRICS AND GYNECOLOGY | Facility: CLINIC | Age: 37
End: 2022-09-20
Payer: COMMERCIAL

## 2022-09-20 VITALS
DIASTOLIC BLOOD PRESSURE: 64 MMHG | SYSTOLIC BLOOD PRESSURE: 100 MMHG | BODY MASS INDEX: 23.29 KG/M2 | WEIGHT: 118.63 LBS | HEIGHT: 60 IN

## 2022-09-20 DIAGNOSIS — D23.5: ICD-10-CM

## 2022-09-20 DIAGNOSIS — Z00.00 ANNUAL PHYSICAL EXAM: Primary | ICD-10-CM

## 2022-09-20 PROCEDURE — 3008F PR BODY MASS INDEX (BMI) DOCUMENTED: ICD-10-PCS | Mod: CPTII,S$GLB,, | Performed by: OBSTETRICS & GYNECOLOGY

## 2022-09-20 PROCEDURE — 99999 PR PBB SHADOW E&M-EST. PATIENT-LVL III: ICD-10-PCS | Mod: PBBFAC,,, | Performed by: OBSTETRICS & GYNECOLOGY

## 2022-09-20 PROCEDURE — 3008F BODY MASS INDEX DOCD: CPT | Mod: CPTII,S$GLB,, | Performed by: OBSTETRICS & GYNECOLOGY

## 2022-09-20 PROCEDURE — 1159F MED LIST DOCD IN RCRD: CPT | Mod: CPTII,S$GLB,, | Performed by: OBSTETRICS & GYNECOLOGY

## 2022-09-20 PROCEDURE — 3044F PR MOST RECENT HEMOGLOBIN A1C LEVEL <7.0%: ICD-10-PCS | Mod: CPTII,S$GLB,, | Performed by: OBSTETRICS & GYNECOLOGY

## 2022-09-20 PROCEDURE — 1160F PR REVIEW ALL MEDS BY PRESCRIBER/CLIN PHARMACIST DOCUMENTED: ICD-10-PCS | Mod: CPTII,S$GLB,, | Performed by: OBSTETRICS & GYNECOLOGY

## 2022-09-20 PROCEDURE — 3074F SYST BP LT 130 MM HG: CPT | Mod: CPTII,S$GLB,, | Performed by: OBSTETRICS & GYNECOLOGY

## 2022-09-20 PROCEDURE — 3074F PR MOST RECENT SYSTOLIC BLOOD PRESSURE < 130 MM HG: ICD-10-PCS | Mod: CPTII,S$GLB,, | Performed by: OBSTETRICS & GYNECOLOGY

## 2022-09-20 PROCEDURE — 99395 PREV VISIT EST AGE 18-39: CPT | Mod: S$GLB,,, | Performed by: OBSTETRICS & GYNECOLOGY

## 2022-09-20 PROCEDURE — 99999 PR PBB SHADOW E&M-EST. PATIENT-LVL III: CPT | Mod: PBBFAC,,, | Performed by: OBSTETRICS & GYNECOLOGY

## 2022-09-20 PROCEDURE — 1159F PR MEDICATION LIST DOCUMENTED IN MEDICAL RECORD: ICD-10-PCS | Mod: CPTII,S$GLB,, | Performed by: OBSTETRICS & GYNECOLOGY

## 2022-09-20 PROCEDURE — 99395 PR PREVENTIVE VISIT,EST,18-39: ICD-10-PCS | Mod: S$GLB,,, | Performed by: OBSTETRICS & GYNECOLOGY

## 2022-09-20 PROCEDURE — 3078F DIAST BP <80 MM HG: CPT | Mod: CPTII,S$GLB,, | Performed by: OBSTETRICS & GYNECOLOGY

## 2022-09-20 PROCEDURE — 3044F HG A1C LEVEL LT 7.0%: CPT | Mod: CPTII,S$GLB,, | Performed by: OBSTETRICS & GYNECOLOGY

## 2022-09-20 PROCEDURE — 1160F RVW MEDS BY RX/DR IN RCRD: CPT | Mod: CPTII,S$GLB,, | Performed by: OBSTETRICS & GYNECOLOGY

## 2022-09-20 PROCEDURE — 3078F PR MOST RECENT DIASTOLIC BLOOD PRESSURE < 80 MM HG: ICD-10-PCS | Mod: CPTII,S$GLB,, | Performed by: OBSTETRICS & GYNECOLOGY

## 2022-09-20 NOTE — PROGRESS NOTES
GYN Annual exam  2022    Chief Complaint   Patient presents with    Well Woman         HISTORY OF PRESENT ILLNESS:  Pt is a  37 y.o.  alert female who presents today for GYN annual exam. She denies vaginal bleeding and discharge.   Sexually active. Feels safe. No pelvic pain. Stopped intrarosa due to cost. Feels fatigued.   She has concerns with a persistent right buttocks skin lump.     Patient's last menstrual period was 2019.    Review of patient's allergies indicates:   Allergen Reactions    Celestone [betamethasone] Palpitations       Current Outpatient Medications on File Prior to Visit   Medication Sig Dispense Refill    FLUoxetine 10 MG capsule Take 1 capsule (10 mg total) by mouth once daily. 30 capsule 11    INTRAROSA 6.5 mg Inst       levocetirizine (XYZAL) 5 MG tablet Take 1 tablet (5 mg total) by mouth every evening. (Patient not taking: Reported on 2022) 30 tablet 11    sumatriptan (IMITREX) 50 MG tablet Take 1 tablet (50 mg total) by mouth every 2 (two) hours as needed for Migraine (max 200 mg). (Patient not taking: Reported on 2022) 30 tablet 0    [DISCONTINUED] EScitalopram oxalate (LEXAPRO) 10 MG tablet Take 1 tablet (10 mg total) by mouth once daily. (Patient not taking: Reported on 2022) 30 tablet 11     No current facility-administered medications on file prior to visit.       Past Medical History:   Diagnosis Date    Anxiety     Chronic sinusitis     Epigastric pain     Kidney stones     history    Nephrolithiasis     OAB (overactive bladder)             Past Surgical History:   Procedure Laterality Date    CERVICAL CERCLAGE  2013     SECTION      CYSTOSCOPY N/A 2019    Procedure: CYSTOSCOPY;  Surgeon: Yokasta John MD;  Location: [facility] OR;  Service: OB/GYN;  Laterality: N/A;    ESOPHAGOGASTRODUODENOSCOPY N/A 2019    Procedure: EGD (ESOPHAGOGASTRODUODENOSCOPY);  Surgeon: Ferdinand Cooney MD;  Location: [facility];  Service:  General;  Laterality: N/A;    HYSTERECTOMY      LAPAROSCOPIC SALPINGECTOMY Bilateral 2019    Procedure: SALPINGECTOMY, LAPAROSCOPIC;  Surgeon: Yokasta John MD;  Location: [facility] OR;  Service: OB/GYN;  Laterality: Bilateral;    LAPAROSCOPIC TOTAL HYSTERECTOMY N/A 2019    Procedure: HYSTERECTOMY, TOTAL, LAPAROSCOPIC;  Surgeon: Yokasta John MD;  Location: [facility] OR;  Service: OB/GYN;  Laterality: N/A;    TUBAL LIGATION  14       Social History     Socioeconomic History    Marital status:    Tobacco Use    Smoking status: Never    Smokeless tobacco: Never   Substance and Sexual Activity    Alcohol use: Never     Alcohol/week: 0.0 standard drinks    Drug use: Never    Sexual activity: Yes     Partners: Male     Birth control/protection: Other-see comments, See Surgical Hx     Comment: Tubal                     Family History   Problem Relation Age of Onset    Breast cancer Maternal Grandmother 68    Breast cancer Maternal Aunt 56    Esophageal cancer Maternal Aunt     Cancer Neg Hx     Colon cancer Neg Hx     Diabetes Neg Hx     Eclampsia Neg Hx     Miscarriages / Stillbirths Neg Hx     Hypertension Neg Hx     Ovarian cancer Neg Hx      labor Neg Hx     Stroke Neg Hx        OB History    Para Term  AB Living   5 4 3 1 1 3   SAB IAB Ectopic Multiple Live Births   1       1      # Outcome Date GA Lbr Reginald/2nd Weight Sex Delivery Anes PTL Lv   5 Term 14 39w1d  3.195 kg (7 lb 0.7 oz) F CS-LTranv Spinal N GUI   4  2012 22w0d  0.709 kg (1 lb 9 oz) M       3 Term 09/10/08 38w0d  3.629 kg (8 lb) M CS-LTranv EPI     2 Term 05 42w0d  3.345 kg (7 lb 6 oz) M CS-LTranv EPI     1 SAB              Gynecological History:     Denies hx of abnormal paps.   S/p TLH.     ROS: Negative except as above      PHYSICAL EXAM  /64   Ht 5' (1.524 m)   Wt 53.8 kg (118 lb 9.7 oz)   LMP 2019   BMI 23.16 kg/m²   GENERAL APPEARANCE:  The patient is a  pleasant, normal appearing female with normal affect and in no distress.   BREASTS: Breast exam performed supine.  No masses, non-tender, no nipple discharge or lymphadenopathy.  ABDOMEN: Soft, non-tender, non-distended.  No hepatosplenomegaly.  No umbilical or inguinal hernias.  SKIN:  Warm and dry to touch.  No lesions or rashes noted.    PSYCHIATRIC/NEUROLOGIC:  Appropriate mood and affect, normal recall, alert and oriented x 3  EXTREMITIES:  Warm and well perfused.  No edema noted.  Muscle strength and sensation are normal bilaterally 5/5 in both upper and lower extremities.   :  Vulva: Inspection of her external genitalia reveals normal mons pubis, labia minora and labia majora.  Normal appearing clitoris, urethral meatus and [place]'s glands.    Bladder:  No evidence of urethral or bladder tenderness.    Vagina:  Speculum exam reveals pink and moist vaginal mucosa.  Bartholin gland is normal to palpation.  Cervix:  Cervix is absent. Vaginal cuff visibly and palpably intact.  Uterus:  Uterus is absent.  No adnexal masses are palpated.  Adnexa are non-tender to palpation  Perineum:  Perineum appears normal.  Anus:  Normal with no apparent lesions.   Buttocks: 1.5 x 1.5 cm right buttocks lump just beneath the skin that is firm. No erythema or induration.       ASSESSMENT/PLAN:  Pt is a  37 y.o.  alert female who presents today for GYN annual exam.  - Pap : Not indicated given prior hysterectomy  - GCCT and Trich testing declined  - Serum STD testing declined  - Contraception: hysterectomy  - Clinical breast examination performed today after discussion with the patient, negative for gross abnormality  - Mammogram: Discussions regarding screening mammography to start at age 40 per the most current ACOG guidelines  - We discussed consistent aerobic exercise, consistent seatbelt use, ; self-breast awareness was discussed and taught;    Right buttocks lesion: Patient is considering removal. Referral to general  surgery.     Pt voiced understanding of all counseling and instructions; no barriers to learning  RTO in 1 year or PRN       Escobar Dennis  9/20/2022

## 2022-09-27 ENCOUNTER — TELEPHONE (OUTPATIENT)
Dept: ORTHOPEDICS | Facility: CLINIC | Age: 37
End: 2022-09-27
Payer: COMMERCIAL

## 2022-09-27 ENCOUNTER — PATIENT MESSAGE (OUTPATIENT)
Dept: PRIMARY CARE CLINIC | Facility: CLINIC | Age: 37
End: 2022-09-27
Payer: COMMERCIAL

## 2022-09-29 ENCOUNTER — OFFICE VISIT (OUTPATIENT)
Dept: SURGERY | Facility: CLINIC | Age: 37
End: 2022-09-29
Payer: COMMERCIAL

## 2022-09-29 VITALS
SYSTOLIC BLOOD PRESSURE: 120 MMHG | WEIGHT: 120.94 LBS | DIASTOLIC BLOOD PRESSURE: 74 MMHG | BODY MASS INDEX: 23.74 KG/M2 | HEIGHT: 60 IN

## 2022-09-29 DIAGNOSIS — D23.5: Primary | ICD-10-CM

## 2022-09-29 PROCEDURE — 99214 OFFICE O/P EST MOD 30 MIN: CPT | Mod: S$GLB,,, | Performed by: SURGERY

## 2022-09-29 PROCEDURE — 3008F BODY MASS INDEX DOCD: CPT | Mod: CPTII,S$GLB,, | Performed by: SURGERY

## 2022-09-29 PROCEDURE — 3078F PR MOST RECENT DIASTOLIC BLOOD PRESSURE < 80 MM HG: ICD-10-PCS | Mod: CPTII,S$GLB,, | Performed by: SURGERY

## 2022-09-29 PROCEDURE — 99999 PR PBB SHADOW E&M-EST. PATIENT-LVL IV: ICD-10-PCS | Mod: PBBFAC,,, | Performed by: SURGERY

## 2022-09-29 PROCEDURE — 3008F PR BODY MASS INDEX (BMI) DOCUMENTED: ICD-10-PCS | Mod: CPTII,S$GLB,, | Performed by: SURGERY

## 2022-09-29 PROCEDURE — 1160F PR REVIEW ALL MEDS BY PRESCRIBER/CLIN PHARMACIST DOCUMENTED: ICD-10-PCS | Mod: CPTII,S$GLB,, | Performed by: SURGERY

## 2022-09-29 PROCEDURE — 3044F PR MOST RECENT HEMOGLOBIN A1C LEVEL <7.0%: ICD-10-PCS | Mod: CPTII,S$GLB,, | Performed by: SURGERY

## 2022-09-29 PROCEDURE — 3074F PR MOST RECENT SYSTOLIC BLOOD PRESSURE < 130 MM HG: ICD-10-PCS | Mod: CPTII,S$GLB,, | Performed by: SURGERY

## 2022-09-29 PROCEDURE — 99214 PR OFFICE/OUTPT VISIT, EST, LEVL IV, 30-39 MIN: ICD-10-PCS | Mod: S$GLB,,, | Performed by: SURGERY

## 2022-09-29 PROCEDURE — 1159F PR MEDICATION LIST DOCUMENTED IN MEDICAL RECORD: ICD-10-PCS | Mod: CPTII,S$GLB,, | Performed by: SURGERY

## 2022-09-29 PROCEDURE — 3074F SYST BP LT 130 MM HG: CPT | Mod: CPTII,S$GLB,, | Performed by: SURGERY

## 2022-09-29 PROCEDURE — 99999 PR PBB SHADOW E&M-EST. PATIENT-LVL IV: CPT | Mod: PBBFAC,,, | Performed by: SURGERY

## 2022-09-29 PROCEDURE — 1160F RVW MEDS BY RX/DR IN RCRD: CPT | Mod: CPTII,S$GLB,, | Performed by: SURGERY

## 2022-09-29 PROCEDURE — 3078F DIAST BP <80 MM HG: CPT | Mod: CPTII,S$GLB,, | Performed by: SURGERY

## 2022-09-29 PROCEDURE — 3044F HG A1C LEVEL LT 7.0%: CPT | Mod: CPTII,S$GLB,, | Performed by: SURGERY

## 2022-09-29 PROCEDURE — 1159F MED LIST DOCD IN RCRD: CPT | Mod: CPTII,S$GLB,, | Performed by: SURGERY

## 2022-09-29 RX ORDER — CEFAZOLIN SODIUM 2 G/50ML
2 SOLUTION INTRAVENOUS
Status: CANCELLED | OUTPATIENT
Start: 2022-09-29

## 2022-09-30 ENCOUNTER — TELEPHONE (OUTPATIENT)
Dept: SURGERY | Facility: CLINIC | Age: 37
End: 2022-09-30
Payer: COMMERCIAL

## 2022-09-30 NOTE — TELEPHONE ENCOUNTER
Spoke with patient. She confirmed that she did speak with the provider earlier about changing the date of her surgery to 10/5/22. She was informed that her procedure time on that date would be about 1030 but she would be contacted by the pre op nurse to discuss and would also be provided the time to arrive the morning of the surgery. Verbalized understanding. No further issues discussed.

## 2022-09-30 NOTE — TELEPHONE ENCOUNTER
"----- Message from Iwona Lopez sent at 9/30/2022  8:39 AM CDT -----  Consult/Advisory:           Name Of Caller: self    Contact Preference?: [phone]    What is the nature of the call?: requesting a call back in regards to rescheduling procedure to either 10/5 or 10/7           Additional Notes:  "Thank you for all that you do for our patients'"     "

## 2022-10-03 ENCOUNTER — TELEPHONE (OUTPATIENT)
Dept: SURGERY | Facility: CLINIC | Age: 37
End: 2022-10-03
Payer: COMMERCIAL

## 2022-10-03 NOTE — TELEPHONE ENCOUNTER
----- Message from Taisha Coronel sent at 10/3/2022  9:07 AM CDT -----  Contact: Shena @[phone]  Pt needs a call back regarding the change to her sx date

## 2022-10-04 ENCOUNTER — PATIENT MESSAGE (OUTPATIENT)
Dept: INTERNAL MEDICINE | Facility: CLINIC | Age: 37
End: 2022-10-04
Payer: COMMERCIAL

## 2022-10-04 NOTE — PROGRESS NOTES
History & Physical    SUBJECTIVE:     History of Present Illness:  Patient is a 37 y.o. female presents with small soft tissue mass to right gluteus.    Mass approximately 1.5-2 cm.    Some mild discomfort and tenderness to palpation.    Has been present for the past several weeks.    States he is gotten slightly bigger.        Chief Complaint   Patient presents with    Other     Benign neoplasm buttock skin       Review of patient's allergies indicates:   Allergen Reactions    Celestone [betamethasone] Palpitations       Current Outpatient Medications   Medication Sig Dispense Refill    FLUoxetine 10 MG capsule Take 1 capsule (10 mg total) by mouth once daily. 30 capsule 11    INTRAROSA 6.5 mg Inst       levocetirizine (XYZAL) 5 MG tablet Take 1 tablet (5 mg total) by mouth every evening. (Patient not taking: No sig reported) 30 tablet 11    sumatriptan (IMITREX) 50 MG tablet Take 1 tablet (50 mg total) by mouth every 2 (two) hours as needed for Migraine (max 200 mg). (Patient not taking: Reported on 2022) 30 tablet 0     No current facility-administered medications for this visit.       Past Medical History:   Diagnosis Date    Anxiety     Chronic sinusitis     Epigastric pain     Kidney stones     history    Nephrolithiasis     OAB (overactive bladder)      Past Surgical History:   Procedure Laterality Date    CERVICAL CERCLAGE  2013     SECTION      CYSTOSCOPY N/A 2019    Procedure: CYSTOSCOPY;  Surgeon: Yokasta John MD;  Location: [facility];  Service: OB/GYN;  Laterality: N/A;    ESOPHAGOGASTRODUODENOSCOPY N/A 2019    Procedure: EGD (ESOPHAGOGASTRODUODENOSCOPY);  Surgeon: Ferdinand Cooney MD;  Location: [facility];  Service: General;  Laterality: N/A;    HYSTERECTOMY      LAPAROSCOPIC SALPINGECTOMY Bilateral 2019    Procedure: SALPINGECTOMY, LAPAROSCOPIC;  Surgeon: Yokasta John MD;  Location: [facility];  Service: OB/GYN;  Laterality: Bilateral;     LAPAROSCOPIC TOTAL HYSTERECTOMY N/A 2019    Procedure: HYSTERECTOMY, TOTAL, LAPAROSCOPIC;  Surgeon: Yokasta John MD;  Location: [facility];  Service: OB/GYN;  Laterality: N/A;    TUBAL LIGATION  14     Family History   Problem Relation Age of Onset    Breast cancer Maternal Grandmother 68    Breast cancer Maternal Aunt 56    Esophageal cancer Maternal Aunt     Cancer Neg Hx     Colon cancer Neg Hx     Diabetes Neg Hx     Eclampsia Neg Hx     Miscarriages / Stillbirths Neg Hx     Hypertension Neg Hx     Ovarian cancer Neg Hx      labor Neg Hx     Stroke Neg Hx      Social History     Tobacco Use    Smoking status: Never    Smokeless tobacco: Never   Substance Use Topics    Alcohol use: Never     Alcohol/week: 0.0 standard drinks    Drug use: Never        Review of Systems:  Review of Systems   Constitutional:  Negative for appetite change, fatigue, fever and unexpected weight change.   HENT:  Negative for sore throat and trouble swallowing.    Eyes: Negative.    Respiratory:  Negative for cough, shortness of breath and wheezing.    Cardiovascular:  Negative for chest pain and leg swelling.   Gastrointestinal:  Negative for abdominal distention, abdominal pain, blood in stool, constipation, diarrhea, nausea and vomiting.   Endocrine: Negative.    Genitourinary: Negative.    Musculoskeletal:  Negative for back pain.   Skin: Negative.  Negative for rash.   Allergic/Immunologic: Negative.    Neurological: Negative.    Hematological: Negative.    Psychiatric/Behavioral:  Negative for confusion.      OBJECTIVE:     Vital Signs (Most Recent)  BP: 120/74 (22 1339)  5' (1.524 m)  54.9 kg (120 lb 14.8 oz)     Physical Exam:  Physical Exam  Vitals and nursing note reviewed.   Constitutional:       Appearance: She is well-developed.   HENT:      Head: Normocephalic and atraumatic.   Cardiovascular:      Rate and Rhythm: Normal rate.      Heart sounds: Normal heart sounds.   Pulmonary:      Effort:  Pulmonary effort is normal.   Abdominal:      General: Bowel sounds are normal. There is no distension.      Palpations: Abdomen is soft.      Tenderness: There is no abdominal tenderness.   Musculoskeletal:         General: Normal range of motion.      Cervical back: Normal range of motion.   Skin:     General: Skin is warm and dry.      Capillary Refill: Capillary refill takes less than 2 seconds.             Comments: Small gluteal mass, 1.5 cm   Surgical excision OR      Neurological:      Mental Status: She is alert and oriented to person, place, and time.   Psychiatric:         Behavior: Behavior normal.     Laboratory  CBC: Reviewed  CMP: Reviewed      ASSESSMENT/PLAN:     37-year-old female with a small gluteal, perineal cystic mass.        PLAN:Plan     Surgical excision OR  All risks and benefits discussed

## 2022-11-04 ENCOUNTER — PATIENT MESSAGE (OUTPATIENT)
Dept: PRIMARY CARE CLINIC | Facility: CLINIC | Age: 37
End: 2022-11-04
Payer: COMMERCIAL

## 2022-11-07 ENCOUNTER — TELEPHONE (OUTPATIENT)
Dept: OTOLARYNGOLOGY | Facility: CLINIC | Age: 37
End: 2022-11-07
Payer: COMMERCIAL

## 2022-11-07 ENCOUNTER — PATIENT MESSAGE (OUTPATIENT)
Dept: PRIMARY CARE CLINIC | Facility: CLINIC | Age: 37
End: 2022-11-07
Payer: COMMERCIAL

## 2022-11-07 RX ORDER — MONTELUKAST SODIUM 10 MG/1
10 TABLET ORAL NIGHTLY
Qty: 30 TABLET | Refills: 2 | Status: SHIPPED | OUTPATIENT
Start: 2022-11-07

## 2022-11-07 NOTE — TELEPHONE ENCOUNTER
----- Message from Minesh Boggs MA sent at 11/7/2022  4:51 PM CST -----  Contact: [phone]  Patient is calling to schedule sooner appt than 12/15/22 at the [place] location.  Pt access tried but no appts are available.  the patient can be reached at [phone]

## 2022-11-08 NOTE — TELEPHONE ENCOUNTER
Called pt and rescheduled to have her see Dr. Omer this Friday at [facility]. Pt states that she is wanting to get sinus surgery done before the end of the year. States that a few years ago, she saw a different ENT (Dr. Nuñez) and was going to get set up with balloon sinuplasty, but was not able to have it done at that time. Thanks, Lou

## 2022-11-11 ENCOUNTER — OFFICE VISIT (OUTPATIENT)
Dept: OTOLARYNGOLOGY | Facility: CLINIC | Age: 37
End: 2022-11-11
Payer: COMMERCIAL

## 2022-11-11 ENCOUNTER — PATIENT MESSAGE (OUTPATIENT)
Dept: OTOLARYNGOLOGY | Facility: CLINIC | Age: 37
End: 2022-11-11

## 2022-11-11 VITALS
BODY MASS INDEX: 23.46 KG/M2 | HEART RATE: 81 BPM | HEIGHT: 60 IN | DIASTOLIC BLOOD PRESSURE: 71 MMHG | WEIGHT: 119.5 LBS | SYSTOLIC BLOOD PRESSURE: 107 MMHG

## 2022-11-11 DIAGNOSIS — J32.0 CHRONIC MAXILLARY SINUSITIS: Primary | ICD-10-CM

## 2022-11-11 PROCEDURE — 99204 PR OFFICE/OUTPT VISIT, NEW, LEVL IV, 45-59 MIN: ICD-10-PCS | Mod: 25,S$GLB,, | Performed by: OTOLARYNGOLOGY

## 2022-11-11 PROCEDURE — 3074F PR MOST RECENT SYSTOLIC BLOOD PRESSURE < 130 MM HG: ICD-10-PCS | Mod: CPTII,S$GLB,, | Performed by: OTOLARYNGOLOGY

## 2022-11-11 PROCEDURE — 3078F DIAST BP <80 MM HG: CPT | Mod: CPTII,S$GLB,, | Performed by: OTOLARYNGOLOGY

## 2022-11-11 PROCEDURE — 1159F MED LIST DOCD IN RCRD: CPT | Mod: CPTII,S$GLB,, | Performed by: OTOLARYNGOLOGY

## 2022-11-11 PROCEDURE — 3008F PR BODY MASS INDEX (BMI) DOCUMENTED: ICD-10-PCS | Mod: CPTII,S$GLB,, | Performed by: OTOLARYNGOLOGY

## 2022-11-11 PROCEDURE — 1159F PR MEDICATION LIST DOCUMENTED IN MEDICAL RECORD: ICD-10-PCS | Mod: CPTII,S$GLB,, | Performed by: OTOLARYNGOLOGY

## 2022-11-11 PROCEDURE — 87186 SC STD MICRODIL/AGAR DIL: CPT | Performed by: OTOLARYNGOLOGY

## 2022-11-11 PROCEDURE — 99999 PR PBB SHADOW E&M-EST. PATIENT-LVL IV: ICD-10-PCS | Mod: PBBFAC,,, | Performed by: OTOLARYNGOLOGY

## 2022-11-11 PROCEDURE — 3008F BODY MASS INDEX DOCD: CPT | Mod: CPTII,S$GLB,, | Performed by: OTOLARYNGOLOGY

## 2022-11-11 PROCEDURE — 99999 PR PBB SHADOW E&M-EST. PATIENT-LVL IV: CPT | Mod: PBBFAC,,, | Performed by: OTOLARYNGOLOGY

## 2022-11-11 PROCEDURE — 3074F SYST BP LT 130 MM HG: CPT | Mod: CPTII,S$GLB,, | Performed by: OTOLARYNGOLOGY

## 2022-11-11 PROCEDURE — 31231 NASAL ENDOSCOPY DX: CPT | Mod: S$GLB,,, | Performed by: OTOLARYNGOLOGY

## 2022-11-11 PROCEDURE — 87075 CULTR BACTERIA EXCEPT BLOOD: CPT | Performed by: OTOLARYNGOLOGY

## 2022-11-11 PROCEDURE — 87070 CULTURE OTHR SPECIMN AEROBIC: CPT | Performed by: OTOLARYNGOLOGY

## 2022-11-11 PROCEDURE — 3044F HG A1C LEVEL LT 7.0%: CPT | Mod: CPTII,S$GLB,, | Performed by: OTOLARYNGOLOGY

## 2022-11-11 PROCEDURE — 3078F PR MOST RECENT DIASTOLIC BLOOD PRESSURE < 80 MM HG: ICD-10-PCS | Mod: CPTII,S$GLB,, | Performed by: OTOLARYNGOLOGY

## 2022-11-11 PROCEDURE — 3044F PR MOST RECENT HEMOGLOBIN A1C LEVEL <7.0%: ICD-10-PCS | Mod: CPTII,S$GLB,, | Performed by: OTOLARYNGOLOGY

## 2022-11-11 PROCEDURE — 99204 OFFICE O/P NEW MOD 45 MIN: CPT | Mod: 25,S$GLB,, | Performed by: OTOLARYNGOLOGY

## 2022-11-11 PROCEDURE — 87077 CULTURE AEROBIC IDENTIFY: CPT | Performed by: OTOLARYNGOLOGY

## 2022-11-11 PROCEDURE — 31231 NASAL/SINUS ENDOSCOPY: ICD-10-PCS | Mod: S$GLB,,, | Performed by: OTOLARYNGOLOGY

## 2022-11-11 PROCEDURE — 1160F PR REVIEW ALL MEDS BY PRESCRIBER/CLIN PHARMACIST DOCUMENTED: ICD-10-PCS | Mod: CPTII,S$GLB,, | Performed by: OTOLARYNGOLOGY

## 2022-11-11 PROCEDURE — 1160F RVW MEDS BY RX/DR IN RCRD: CPT | Mod: CPTII,S$GLB,, | Performed by: OTOLARYNGOLOGY

## 2022-11-11 RX ORDER — CEFDINIR 300 MG/1
300 CAPSULE ORAL 2 TIMES DAILY
Qty: 20 CAPSULE | Refills: 0 | Status: SHIPPED | OUTPATIENT
Start: 2022-11-11 | End: 2022-11-21

## 2022-11-11 NOTE — PROGRESS NOTES
[facility]    Subjective:      Patient: Shena Gomez Patient PCP: Dixon Anderson MD         :  1985     Sex:  female      MRN:  [id number]          Date of Visit: 2022      Chief Complaint: Sinus Problem (States has ongoing sinus issues. States gets facial pressure, top teeth hurt, headaches. States saw ENT about 2 years ago and Balloon Sinuplasty was discussed, pt could not afford the co-pay at that time. Is interested in restarting the workup. Does use Astelin NS, does use Navage as well. )      Patient ID: Shena Gomez is a 37 y.o. female lifelong NON-smoker with a history of migraines for which she takes as needed Imitrex self-referred for facial pressure and pain into the teeth.  Told before this was sinusitis and recommended balloon sinuplasty.  No imaging for review.  Does not use intranasal steroids.  She does use a lavage as well as as needed Astelin nasal spray and Singulair nightly.    Previously seen by Dr. Nuñez who reportedly recommended balloon dilation sinuplasty for chronic maxillary sinusitis.  She was seen by dentistry and told it was nothing to do with her teeth that she has this pressure and pain and right greater than left cheek distribution.  She is used multiple different nasal steroids Flonase and Nasacort without any improvement in her chronic persistent pressure and discomfort symptoms.  She gets intermittent green discharge seeing some as recently as yesterday.  No blood no ocular symptoms.  She reportedly had a CT scan done she believes at [facility] but there is no record of it on the database is on my review.  She gets transient improvement with antibiotics and prednisone but as soon as the therapy is over she is back to her standard pressure and discomfort right greater than left.    Review of Systems   Constitutional:  Positive for fatigue. Negative for fever.   HENT:  Positive for congestion, sinus pressure and sinus pain. Negative for dental problem, facial  swelling, nosebleeds, rhinorrhea, sneezing and sore throat.    Eyes: Negative.    Respiratory: Negative.     Cardiovascular: Negative.    Gastrointestinal: Negative.    Endocrine: Negative.    Genitourinary: Negative.    Musculoskeletal: Negative.    Allergic/Immunologic: Negative.    Neurological: Negative.    Hematological: Negative.    Psychiatric/Behavioral: Negative.        Past Medical History  She has a past medical history of Anxiety, Chronic sinusitis, Epigastric pain, Kidney stones, Nephrolithiasis, and OAB (overactive bladder).    Family / Surgical / Social History  Her family history includes Breast cancer (age of onset: 56) in her maternal aunt; Breast cancer (age of onset: 68) in her maternal grandmother; Esophageal cancer in her maternal aunt.    Past Surgical History:   Procedure Laterality Date    Benign neoplasm buttock skin Right 10/05/2022    CERVICAL CERCLAGE  2013     SECTION      CYSTOSCOPY N/A 2019    Procedure: CYSTOSCOPY;  Surgeon: Yokasta John MD;  Location: [facility];  Service: OB/GYN;  Laterality: N/A;    ESOPHAGOGASTRODUODENOSCOPY N/A 2019    Procedure: EGD (ESOPHAGOGASTRODUODENOSCOPY);  Surgeon: Ferdinand Cooney MD;  Location: [facility];  Service: General;  Laterality: N/A;    HYSTERECTOMY      LAPAROSCOPIC SALPINGECTOMY Bilateral 2019    Procedure: SALPINGECTOMY, LAPAROSCOPIC;  Surgeon: Yokasta John MD;  Location: [facility];  Service: OB/GYN;  Laterality: Bilateral;    LAPAROSCOPIC TOTAL HYSTERECTOMY N/A 2019    Procedure: HYSTERECTOMY, TOTAL, LAPAROSCOPIC;  Surgeon: Yokasta John MD;  Location: [facility];  Service: OB/GYN;  Laterality: N/A;    SOFT TISSUE BIOPSY Right 10/5/2022    Procedure: BIOPSY, SOFT TISSUE, right medial thigh/gluteal area;  Surgeon: Ferdinand Cooney MD;  Location: [facility];  Service: General;  Laterality: Right;    TUBAL LIGATION  2014       Social History     Tobacco Use    Smoking  status: Never    Smokeless tobacco: Never   Substance and Sexual Activity    Alcohol use: Never     Alcohol/week: 0.0 standard drinks    Drug use: Never    Sexual activity: Yes     Partners: Male     Birth control/protection: Other-see comments, See Surgical Hx     Comment: Tubal       Medications  She has a current medication list which includes the following prescription(s): azelastine hcl, fluoxetine, montelukast, and sumatriptan.      Allergies  Review of patient's allergies indicates:   Allergen Reactions    Celestone [betamethasone] Palpitations       All medications, allergies, and past history have been reviewed.    Objective:      Vitals:  Vitals - 1 value per visit 10/7/2022 11/11/2022 11/11/2022   SYSTOLIC - - 107   DIASTOLIC - - 71   Pulse - - 81   Temp - - -   Resp - - -   SPO2 - - -   Weight (lb) - - 119.49   Weight (kg) - - 54.2   Height - - 60   BMI (Calculated) - - 23.3   VISIT REPORT - - -   Pain Score  3 7 -   Some recent data might be hidden       Body surface area is 1.51 meters squared.    Physical Exam:    GENERAL  APPEARANCE -  alert, appears stated age, and cooperative  BARRIER(S) TO COMMUNICATION -  none VOICE - appropriate for age and gender    INTEGUMENTARY  no suspicious head and neck lesions    HEENT  HEAD: Normocephalic, without obvious abnormality, atraumatic  FACE: INSPECTION - Symmetric, no signs of trauma, no suspicious lesion(s)  PALPATION -  No masses SALIVARY GLANDS - non-tender with no appreciable mass  STRENGTH - facial symmetry  NECK/THYROID: normal atraumatic, no neck masses, normal thyroid, no jvd    EYES  Normal occular alignment and mobility with no visible nystagmus at rest    EARS/NOSE/MOUTH/THROAT  EARS  PINNAE AND EXTERNAL EARS - no suspicious lesion OTOSCOPIC EXAM (surgical microscopy was not used for visualization/instrumentation): EAR EXAM - Normal ear canals, tympanic membranes and mobility, and middle ear spaces bilaterally.  HEARING - grossly intact to  voice/finger rub    NOSE AND SINUSES  EXTERNAL NOSE - Grossly normal for age/sex  SEPTUM - normal/no obstruction on anterior exam without decongestion TURBINATES - within normal limits MUCOSA - within normal limits     MOUTH AND THROAT   ORAL CAVITY, LIPS, TEETH, GUMS & TONGUE - moist, no suspicious lesions  OROPHARYNX /TONSILS/PHARYNGEAL WALLS/HYPOPHARYNX - no erythema or exudates  NASOPHARYNX - limited mirror exam - unable to visualize due to anatomy/gag  LARYNX -  - limited mirror exam - unable to visualize due to anatomy/gag      CHEST AND LUNG   INSPECTION & AUSCULTATION - normal effort, no stridor    CARDIOVASCULAR  AUSCULTATION & PERIPHERAL VASCULAR - regular rate and rhythm.    NEUROLOGIC  MENTAL STATUS - alert, interactive CRANIAL NERVES - normal    LYMPHATIC  HEAD AND NECK - non-palpable         Procedure(s):  Nasal endoscopy performed.  See procedure note.              Labs:  WBC   Date Value Ref Range Status   07/25/2022 8.15 3.90 - 12.70 K/uL Final     Hemoglobin   Date Value Ref Range Status   07/25/2022 14.6 12.0 - 16.0 g/dL Final     Platelets   Date Value Ref Range Status   07/25/2022 249 150 - 450 K/uL Final     Creatinine   Date Value Ref Range Status   07/25/2022 0.8 0.5 - 1.4 mg/dL Final     TSH   Date Value Ref Range Status   01/20/2021 1.51 0.45 - 5.33 uIU/mL Final     Glucose   Date Value Ref Range Status   07/25/2022 92 70 - 110 mg/dL Final     Hemoglobin A1C   Date Value Ref Range Status   07/25/2022 4.7 4.0 - 5.6 % Final     Comment:     ADA Screening Guidelines:  5.7-6.4%  Consistent with prediabetes  >or=6.5%  Consistent with diabetes    High levels of fetal hemoglobin interfere with the HbA1C  assay. Heterozygous hemoglobin variants (HbS, HgC, etc)do  not significantly interfere with this assay.   However, presence of multiple variants may affect accuracy.           Assessment:      Problem List Items Addressed This Visit    None  Visit Diagnoses       Chronic maxillary sinusitis    -   Primary                 Plan:         Afrin regimen for 1 week only as outlined.  Continue with lavage.  Try to do the lavage twice daily with 1 spray each side towards the ear after of the saline irrigation morning and afternoon.  Use the Astelin nasal spray over-the-counter only as necessary for sneezing and watery drip.  This can cause some excessive dryness and even thickening of mucus.      We will start with a three-week course of cefdinir for a prolonged sinus infection at least on the right side.  We will hold off on any prednisone not to masks symptom improvement.  We will try to get copies of previous records from Dr. Nuñez and determine if we can get a copy of the CT scan done approximately 2 years ago.      Depending upon the findings of the CT scan and response to antibiotic therapy at follow-up in 3 weeks we can determine if balloon dilation sinuplasty, additional CT scanning, or more definitive endoscopic sinus surgery is an appropriate next step.

## 2022-11-11 NOTE — PROCEDURES
"Nasal/sinus endoscopy    Date/Time: 11/11/2022 1:00 PM    Time out: Immediately prior to procedure a "time out" was called to verify the correct patient, procedure, equipment, support staff and site/side marked as required.  Performed by: Des Omer MD  Authorized by: Des Omer MD     Consent Done?:  Yes (Verbal)  Anesthesia:     Local anesthetic:  Other (4% lidocaine and topical oxymetazoline)    Location: Both nares examined.    Local Atomizer?      Type of Endoscope:  Flexible (Storz slimline video flexible laryngoscope)  Nose:     Procedure Performed:  Nasal Endoscopy  Intranasal:      Mucosa no polyps     Mucosa ulcers not present     No mucosa lesions present     Turbinates not enlarged     No septum gross deformity  Nasopharynx:      Mucopus noted in the right middle meatus/infundibulum.  No edema no polyps.  Left side similarly open with no edema and no appreciable purulence.  No sphenoid ethmoid recess or posterior middle meatal drainage.  "

## 2022-11-11 NOTE — PATIENT INSTRUCTIONS
Afrin regimen for 1 week only as outlined.  Continue with lavage.  Try to do the lavage twice daily with 1 spray each side towards the ear after of the saline irrigation morning and afternoon.  Use the Astelin nasal spray over-the-counter only as necessary for sneezing and watery drip.  This can cause some excessive dryness and even thickening of mucus.      We will start with a three-week course of cefdinir for a prolonged sinus infection at least on the right side.  We will hold off on any prednisone not to masks symptom improvement.  We will try to get copies of previous records from Dr. Nuñez and determine if we can get a copy of the CT scan done approximately 2 years ago.      Depending upon the findings of the CT scan and response to antibiotic therapy at follow-up in 3 weeks we can determine if balloon dilation sinuplasty, additional CT scanning, or more definitive endoscopic sinus surgery is an appropriate next step.    NASAL SALINE    Still saline comes in many preparations including sprays/mists, gels, and rinses.  Different preparations served different purposes.  Saline spray helps to briefly moisturize the nose and help clear mucus.  Saline gels coat the nose for longer protective benefit of keeping the linings the nose moist.  Saline rinses clear the nose and sinuses and a more thorough way in her best used for significant postnasal drip and sinus complaints.  A combination of saline sprays/mists, gels and rinses should be used to address routine nasal clearing and dryness issues as well as flushing for better control of allergy and postnasal drip symptoms.  There is no real risk of over use of nasal saline products.  Saline sprays do not have any of the potential rebound or addiction of nasal decongestant sprays.  Nasal saline sprays and rinses should be used prior to the application of any medicated nasal sprays such as nasal steroids or nasal antihistamine sprays.        AFRIN/OXYMETAZOLINE  NASAL DECONGESTANT REGIMEN    Afrin decongestant nasal spray 2 sprays towards the ear each side twice daily for 3 days, stop for 1 day, return for 3 more days then discontinue use entirely.        INTRANASAL STEROID SPRAYS      Intranasal steroid sprays are available both by prescription and over-the-counter both in generic and brand name preparations.  They are all very similar in efficacy and side effect profiles.  Over-the-counter and prescription intranasal steroids include fluticasone propionate (Flonase), fluticasone furoate (Sensimist), triamcinolone (Nasacort), and budesonide (Rhinocort).  While these medications are available as prescriptions as well there are few nasal steroids in her available by prescription only and include mometasone (Nasonex), flunisolide (Nasarel), and beclomethasone (QNASL).    Nasal steroids or the foundation of treatment of both allergy and other inflammatory conditions of the nose and sinuses.  They are safe for regular use and while there are many side effects listed most of these are steroid class effects and not typically encountered.  Typical side effects include dryness and even ulceration and bleeding of the nose.  These side effects can be minimized by proper application and proper moisturization with saline and saline gel.    Sometimes changing between 1 brand of nasal steroid and another can result in improved control of symptoms especially after long term use of one particular nasal steroid.    Proper application of the nasal steroid spray is accomplished by spraying towards the I/ear on the same side with the tip of the superior just barely inside the nostril with the chin slightly downward.  Any dripping should be gently inhaled not sniff test backwards into the throat.  Labeled instructions should be followed.      ASTELIN (Azelastine) nasal spray    Astelin is a topical nasal antihistamine which can be of additional benefit in controlling nasal allergy and postnasal  drip.  Typically is recommended on an as-needed basis 1-2 sprays each nostril twice daily.  People often find it beneficial at night.  This typically added to her regimen of saline and nasal steroids as a 3rd line agent for as needed use.  Excessive use can cause excessive dryness and even nose bleeds.  It has a very strong taste which many people find intolerable.  Astelin needs to be stopped 5-7 days prior to any skin allergy testing just like oral antihistamines as it will inhibit the skin response.      SINUS RINSE INSTRUCTIONS    Nasal Saline Irrigation Instructions  You can wash your nasal and sinus passages using nasal saline (salt water) irrigation. This   is simple and effective. Follow the instructions below, as well as the ones provided by your   physician.  Supplies  First, you will need a nasal saline irrigation bottle and rinse solution.   You can purchase nasal rinse kits that include these items (such as   NeilMed®, Ayr®, Simply Saline®, Ocean Complete®) at most drug   stores. You can also make your own saline irrigation solution by   adding kosher (non-iodine) salt and baking soda to distilled water.   Your physician may tell you to add medications like a steroid or   antibiotic to the rinse as needed.  Steps for nasal irrigation  Step 1. Fill the bottle  ? Wash your hands.  ? Fill the irrigation bottle with lukewarm distilled water or boiled water that has cooled.  Step 2. Mix the solution  ? Put the saline and salt packet contents into the bottle.  ? Tighten the top of the bottle and shake it gently to dissolve the mixture.  ? If you are making your own solution:   - Add 1/4 to 1/2 teaspoon of baking soda and 1/8 teaspoon of kosher (non-iodine) salt   into the bottle.   - Tighten the top of the bottle.   - Shake the bottle gently to dissolve the mixture.  Step 3. Get into position  ?  front of the sink.  ? Unless you were instructed to use another position, bend forward.   Then tilt your  face down about 45 degrees so that you are looking   down into the sink.  ? Gently place the spout of the saline bottle against 1 of your nostrils.  [facility]  CARE AND TREATMENT  Patient Education  ©2018 [employer]  ©2018 [employer]  Step 4. Rinse  ? Breathing through your mouth, gently squeeze the   bottle. This will squirt the solution into your nostril. The   solution will start to drain from the other nostril. Some   may drain from your mouth. This is normal.  ? Use 2 ounces (half of the bottle) on each nostril.  ? Afterwards, you may need to blow your nose gently to   help drain any solution that is left behind.  Step 5. Repeat  ? Repeat steps 3 and 4 with the other nostril.  You can watch a video to learn how to do nasal saline irrigation. Go to [employer] and   search for NeilMed Sinus Rinse.  Step 6.  Clean the bottle and cap. Air dry the Sinus Rinse bottle, cap, and tube on a clean paper towel, a lint free towel, or use NeilMed® NasaDOCK® or NasaDOCK plus (sold separately) to store the bottle, cap and tube.  Please read Warnings before using.  Our recommendation is to replace the bottle every three months.      NEILMED CLEMAIA INSTRUCTIONS    It is very important to keep these devices clean and free from any contamination. Replace the bottle every 3 months.  NasaDock Plus  NasaDock NeilMed® SINUS RINSE Squeeze Bottle: Please perform routine inspections of the bottle and tube for any discolorations and cracks. If there are any visual signs of deterioration or permanent color changes, please clean thoroughly. If the discolorations remain after cleansing, discard the items and purchase new ones. Please follow these instructions after each use of the product. Be sure to replace your product after three months.  Step 1: Rinse the cap, tube and bottle using running water. Fill the bottle with distilled, micro-filtered (through 0.2 micron), reverse  osmosis filtered, commercially bottled or previously boiled and cooled down water at lukewarm or body temperature..  Step 2: Add a few drops of dish washing liquid or baby shampoo.  Step 3: Attach the cap and tube to the bottle; hold your finger over the opening in the cap and shake the bottle vigorously.  Step 4: Squeeze the bottle hard to allow the soapy solution to clean the interior of the tube and the cap. Empty out the bottle completely.  Step 5: Rinse the soap from the bottle, cap and tube thoroughly and place the items on a clean paper towel to dry or use the preferred NasaDOCK® or NasaDOCK plus.    The NasaDOCK® is a simple, hygienic way to dry and store the SINUS RINSE bottle, cap and tube. NasaDOCK® comes with various hanging options and is available in different colors. Our newest model also offers storage for our SINUS RINSE mixture packets. We strongly suggest using NasaDOCK® as an inexpensive, easy way to dry the cap, tube and SINUS RINSE bottle.        Cleaning:  Do not use a  to clean the inside of a bottle. While our bottle is  safe, a  will not adequately clean the SINUS RINSE bottle. The water jets in dishwashers cannot enter the narrow neck of the bottle, and portions of the bottles interior will not be cleaned thoroughly. Additional methods of cleaning the bottle include the use of concentrated white vinegar or isopropyl alcohol (70% concentration), followed by scrubbing and rinsing as described above.       Microwave Disinfection  Clean the device with soap and water as mentioned above and shake off the excess water. Now place the bottle, cap and tube in the microwave for 40 seconds. This will disinfect the bottle, cap and tube. If the microwave has been used recently, please make sure that the inside of the microwave has cooled back down to room temperature before using it to disinfect the bottle.    NeilMed NasaFlo® Neti Pot Users:  Use the same  procedure as above.    Sinugator® Cleaning Directions:  Clean the Sinugator® by running plain water and dry with a clean lint free towel and then air dry the unit by keeping it open to the air. The nasal  tip, blue reservoir and white soft tube can be disinfected by cleaning with soap and water and shaking off the excess water before placing in the home microwave for 60 seconds. Clean the entire unit with a few drops of dishwashing liquid and water every three days to keep the unit clean. As a fi nal rinse to wash off any residual soap or tap water, use either distilled, micro-filtered (through 0.2 micron filter), commercially bottled or previously boiled & cooled down water. Please make sure to rinse thoroughly during each wash so no soap is left behind. DO NOT place the white motor unit in microwave for disinfection. Because of the units stainless steel components, this can cause damage or fire hazards.    General Principles of Maintenance & Storage:  When permissible use a microwave periodically to disinfect devices. Always store NeilMed® products in a cool and dry place with adequate ventilation. NasaDOCK® or NasaDOCK plus offer a simple hygienic way to air dry & neatly store the bottle, cap, tube and NasaFlo. Do not store the bottle with the cap screwed on, unless both are dry. Do not store the wet parts in a sealed plastic bag. If you travel before they are dry, wrap parts separately in paper towels. Hand soap or shampoo can be used for cleaning parts while away from home.        USE ONLY AS DIRECTED, IF SYMPTOMS PERSIST SEE YOUR DOCTOR/HEALTHCARE PROFESSIONAL. ALWAYS READ THE LABEL.

## 2022-11-14 LAB — BACTERIA SPEC AEROBE CULT: ABNORMAL

## 2022-11-14 NOTE — PROGRESS NOTES
Culture growing staph aureus which is something that very well might be causing the chronic infection.  It may also just be living in the nose.  It is sensitive to oxacillin (all staph aureus resistant to penicillin) this technically makes it not MRSA.      The cefdinir is an appropriate antibiotic for this bug as well as the broader spectrum of bugs which may be causing the chronic infection.  Let stay with the current course of therapy barring any worsening of symptoms or other concerns.

## 2022-11-16 LAB — BACTERIA SPEC ANAEROBE CULT: NORMAL

## 2022-12-02 ENCOUNTER — OFFICE VISIT (OUTPATIENT)
Dept: OTOLARYNGOLOGY | Facility: CLINIC | Age: 37
End: 2022-12-02
Payer: COMMERCIAL

## 2022-12-02 VITALS
HEIGHT: 60 IN | DIASTOLIC BLOOD PRESSURE: 64 MMHG | WEIGHT: 119.69 LBS | HEART RATE: 90 BPM | BODY MASS INDEX: 23.5 KG/M2 | SYSTOLIC BLOOD PRESSURE: 100 MMHG

## 2022-12-02 DIAGNOSIS — J01.01 ACUTE RECURRENT MAXILLARY SINUSITIS: Primary | ICD-10-CM

## 2022-12-02 DIAGNOSIS — H93.8X2 EAR FULLNESS, LEFT: ICD-10-CM

## 2022-12-02 PROCEDURE — 3044F PR MOST RECENT HEMOGLOBIN A1C LEVEL <7.0%: ICD-10-PCS | Mod: CPTII,S$GLB,, | Performed by: OTOLARYNGOLOGY

## 2022-12-02 PROCEDURE — 3008F BODY MASS INDEX DOCD: CPT | Mod: CPTII,S$GLB,, | Performed by: OTOLARYNGOLOGY

## 2022-12-02 PROCEDURE — 99214 OFFICE O/P EST MOD 30 MIN: CPT | Mod: S$GLB,,, | Performed by: OTOLARYNGOLOGY

## 2022-12-02 PROCEDURE — 3074F SYST BP LT 130 MM HG: CPT | Mod: CPTII,S$GLB,, | Performed by: OTOLARYNGOLOGY

## 2022-12-02 PROCEDURE — 1159F PR MEDICATION LIST DOCUMENTED IN MEDICAL RECORD: ICD-10-PCS | Mod: CPTII,S$GLB,, | Performed by: OTOLARYNGOLOGY

## 2022-12-02 PROCEDURE — 3008F PR BODY MASS INDEX (BMI) DOCUMENTED: ICD-10-PCS | Mod: CPTII,S$GLB,, | Performed by: OTOLARYNGOLOGY

## 2022-12-02 PROCEDURE — 3078F DIAST BP <80 MM HG: CPT | Mod: CPTII,S$GLB,, | Performed by: OTOLARYNGOLOGY

## 2022-12-02 PROCEDURE — 99214 PR OFFICE/OUTPT VISIT, EST, LEVL IV, 30-39 MIN: ICD-10-PCS | Mod: S$GLB,,, | Performed by: OTOLARYNGOLOGY

## 2022-12-02 PROCEDURE — 99999 PR PBB SHADOW E&M-EST. PATIENT-LVL III: ICD-10-PCS | Mod: PBBFAC,,, | Performed by: OTOLARYNGOLOGY

## 2022-12-02 PROCEDURE — 99999 PR PBB SHADOW E&M-EST. PATIENT-LVL III: CPT | Mod: PBBFAC,,, | Performed by: OTOLARYNGOLOGY

## 2022-12-02 PROCEDURE — 3078F PR MOST RECENT DIASTOLIC BLOOD PRESSURE < 80 MM HG: ICD-10-PCS | Mod: CPTII,S$GLB,, | Performed by: OTOLARYNGOLOGY

## 2022-12-02 PROCEDURE — 1159F MED LIST DOCD IN RCRD: CPT | Mod: CPTII,S$GLB,, | Performed by: OTOLARYNGOLOGY

## 2022-12-02 PROCEDURE — 3074F PR MOST RECENT SYSTOLIC BLOOD PRESSURE < 130 MM HG: ICD-10-PCS | Mod: CPTII,S$GLB,, | Performed by: OTOLARYNGOLOGY

## 2022-12-02 PROCEDURE — 3044F HG A1C LEVEL LT 7.0%: CPT | Mod: CPTII,S$GLB,, | Performed by: OTOLARYNGOLOGY

## 2022-12-02 NOTE — PROGRESS NOTES
[facility]    Subjective:      Patient: Shena Gomez Patient PCP: Dixon Anderson MD         :  1985     Sex:  female      MRN:  [id number]          Date of Visit: 2022      Chief Complaint: Follow-up (3 week f/u Chronic maxillary sinusitis. States symptoms are better. Brought disc from outside CT Scan with her today. )      2022 follow-up visit: Shena Gomez is a 37 y.o. female lifelong NON-smoker with a history of migraines for which she takes as needed Imitrex self-referred for facial pressure and pain into the teeth seen for initial consultation 3 weeks ago with findings of active mucopurulent drainage from the right maxillary sinus/infundibulum cultured which grew MSSA.  Patient had been empirically started on cefdinir x3 weeks for prolonged chronic infection and failed prior balloon sinuplasty.  CT scanning of the sinuses previous prior to our last visit completed with Dr. Nuñez with no new imaging since our visit.    Low resolution CT scan in 3 planes on disc reviewed there is some significant motion artifact the only thing I can really notice is what appears to be an anterior medially based right maxillary retention cyst and what looks like significant narrowing versus possibly occlusion of the ostiomeatal complex on the right side.  I do not appreciate any significant ethmoid thickening or opacification.  The frontal sinuses are aplastic bilaterally.  Sphenoid sinuses are well pneumatized without any mucoperiosteal thickening and there was no contralateral left-sided appreciable mucoperiosteal maxillary sinus disease.      Patient has some difficulty with her Navage system causing left ear pressure.  Ear pressure on the left side is a common complaint for her in general.  She is being treated for infections of the sinuses perhaps 3 times per year and has not used saline rinses and nasal steroids on a routine prophylactic basis.    2022 initial patient consultation: Shena  Carolina Gomez is a 37 y.o. female lifelong NON-smoker with a history of migraines for which she takes as needed Imitrex self-referred for facial pressure and pain into the teeth.  Told before this was sinusitis and recommended balloon sinuplasty.  No imaging for review.  Does not use intranasal steroids.  She does use a lavage as well as as needed Astelin nasal spray and Singulair nightly.    Previously seen by Dr. Nuñez who reportedly recommended balloon dilation sinuplasty for chronic maxillary sinusitis.  She was seen by dentistry and told it was nothing to do with her teeth that she has this pressure and pain and right greater than left cheek distribution.  She is used multiple different nasal steroids Flonase and Nasacort without any improvement in her chronic persistent pressure and discomfort symptoms.  She gets intermittent green discharge seeing some as recently as yesterday.  No blood no ocular symptoms.  She reportedly had a CT scan done she believes at [facility] but there is no record of it on the database is on my review.  She gets transient improvement with antibiotics and prednisone but as soon as the therapy is over she is back to her standard pressure and discomfort right greater than left.    Review of Systems   Constitutional:  Positive for fatigue. Negative for fever.   HENT:  Positive for congestion, sinus pressure and sinus pain. Negative for dental problem, facial swelling, nosebleeds, rhinorrhea, sneezing and sore throat.    Eyes: Negative.    Respiratory: Negative.     Cardiovascular: Negative.    Gastrointestinal: Negative.    Endocrine: Negative.    Genitourinary: Negative.    Musculoskeletal: Negative.    Allergic/Immunologic: Negative.    Neurological: Negative.    Hematological: Negative.    Psychiatric/Behavioral: Negative.        Past Medical History  She has a past medical history of Anxiety, Chronic sinusitis, Epigastric pain, Kidney stones, Nephrolithiasis, and OAB (overactive  bladder).    Family / Surgical / Social History  Her family history includes Breast cancer (age of onset: 56) in her maternal aunt; Breast cancer (age of onset: 68) in her maternal grandmother; Esophageal cancer in her maternal aunt.    Past Surgical History:   Procedure Laterality Date    Benign neoplasm buttock skin Right 10/05/2022    CERVICAL CERCLAGE  2013     SECTION      CYSTOSCOPY N/A 2019    Procedure: CYSTOSCOPY;  Surgeon: Yokasta John MD;  Location: [facility];  Service: OB/GYN;  Laterality: N/A;    ESOPHAGOGASTRODUODENOSCOPY N/A 2019    Procedure: EGD (ESOPHAGOGASTRODUODENOSCOPY);  Surgeon: Ferdinand Cooney MD;  Location: [facility];  Service: General;  Laterality: N/A;    HYSTERECTOMY      LAPAROSCOPIC SALPINGECTOMY Bilateral 2019    Procedure: SALPINGECTOMY, LAPAROSCOPIC;  Surgeon: Yokasta John MD;  Location: [facility];  Service: OB/GYN;  Laterality: Bilateral;    LAPAROSCOPIC TOTAL HYSTERECTOMY N/A 2019    Procedure: HYSTERECTOMY, TOTAL, LAPAROSCOPIC;  Surgeon: Yokasta John MD;  Location: [facility];  Service: OB/GYN;  Laterality: N/A;    SOFT TISSUE BIOPSY Right 10/5/2022    Procedure: BIOPSY, SOFT TISSUE, right medial thigh/gluteal area;  Surgeon: Ferdinand Cooney MD;  Location: [facility];  Service: General;  Laterality: Right;    TUBAL LIGATION  2014       Social History     Tobacco Use    Smoking status: Never    Smokeless tobacco: Never   Substance and Sexual Activity    Alcohol use: Never     Alcohol/week: 0.0 standard drinks    Drug use: Never    Sexual activity: Yes     Partners: Male     Birth control/protection: Other-see comments, See Surgical Hx     Comment: Tubal       Medications  She has a current medication list which includes the following prescription(s): azelastine hcl, fluoxetine, sumatriptan, and montelukast.      Allergies  Review of patient's allergies indicates:   Allergen Reactions    Celestone  [betamethasone] Palpitations       All medications, allergies, and past history have been reviewed.    Objective:      Vitals:  Vitals - 1 value per visit 11/11/2022 12/2/2022 12/2/2022   SYSTOLIC 107 - -   DIASTOLIC 71 - -   Pulse 81 - -   Temp - - -   Resp - - -   SPO2 - - -   Weight (lb) 119.49 - 119.71   Weight (kg) 54.2 - 54.3   Height 60 - 60   BMI (Calculated) 23.3 - 23.4   VISIT REPORT - - -   Pain Score  - 0 -   Some recent data might be hidden       Body surface area is 1.52 meters squared.    Physical Exam:    GENERAL  APPEARANCE -  alert, appears stated age, and cooperative  BARRIER(S) TO COMMUNICATION -  none VOICE - appropriate for age and gender    INTEGUMENTARY  no suspicious head and neck lesions    HEENT  HEAD: Normocephalic, without obvious abnormality, atraumatic  FACE: INSPECTION - Symmetric, no signs of trauma, no suspicious lesion(s)  PALPATION -  No masses SALIVARY GLANDS - non-tender with no appreciable mass  STRENGTH - facial symmetry  NECK/THYROID: normal atraumatic, no neck masses, normal thyroid, no jvd    EYES  Normal occular alignment and mobility with no visible nystagmus at rest    EARS/NOSE/MOUTH/THROAT  EARS  PINNAE AND EXTERNAL EARS - no suspicious lesion OTOSCOPIC EXAM (surgical microscopy was not used for visualization/instrumentation): EAR EXAM - Normal ear canals, tympanic membranes and mobility, and middle ear spaces bilaterally.  HEARING - grossly intact to voice/finger rub    NOSE AND SINUSES  EXTERNAL NOSE - Grossly normal for age/sex  SEPTUM - normal/no obstruction on anterior exam without decongestion TURBINATES - within normal limits MUCOSA - within normal limits     MOUTH AND THROAT   ORAL CAVITY, LIPS, TEETH, GUMS & TONGUE - moist, no suspicious lesions  OROPHARYNX /TONSILS/PHARYNGEAL WALLS/HYPOPHARYNX - no erythema or exudates  NASOPHARYNX - limited mirror exam - unable to visualize due to anatomy/gag  LARYNX -  - limited mirror exam - unable to visualize due to  anatomy/gag      CHEST AND LUNG   INSPECTION & AUSCULTATION - normal effort, no stridor    CARDIOVASCULAR  AUSCULTATION & PERIPHERAL VASCULAR - regular rate and rhythm.    NEUROLOGIC  MENTAL STATUS - alert, interactive CRANIAL NERVES - normal    LYMPHATIC  HEAD AND NECK - non-palpable         Procedure(s):  None          Labs:  WBC   Date Value Ref Range Status   07/25/2022 8.15 3.90 - 12.70 K/uL Final     Hemoglobin   Date Value Ref Range Status   07/25/2022 14.6 12.0 - 16.0 g/dL Final     Platelets   Date Value Ref Range Status   07/25/2022 249 150 - 450 K/uL Final     Creatinine   Date Value Ref Range Status   07/25/2022 0.8 0.5 - 1.4 mg/dL Final     TSH   Date Value Ref Range Status   01/20/2021 1.51 0.45 - 5.33 uIU/mL Final     Glucose   Date Value Ref Range Status   07/25/2022 92 70 - 110 mg/dL Final     Hemoglobin A1C   Date Value Ref Range Status   07/25/2022 4.7 4.0 - 5.6 % Final     Comment:     ADA Screening Guidelines:  5.7-6.4%  Consistent with prediabetes  >or=6.5%  Consistent with diabetes    High levels of fetal hemoglobin interfere with the HbA1C  assay. Heterozygous hemoglobin variants (HbS, HgC, etc)do  not significantly interfere with this assay.   However, presence of multiple variants may affect accuracy.           Assessment:      Problem List Items Addressed This Visit    None  Visit Diagnoses       Acute recurrent maxillary sinusitis    -  Primary    Ear fullness, left                   Plan:         The indications to proceed with balloon dilation sinuplasty or definitive endoscopic sinus surgery in the OR are not clearly met at this point.  It very well might be an appropriate next step if symptoms are not better control with more regular use of saline/sinus rinses such as NeilMed which may be better tolerated, and 1 spray of nasal steroid such as fluticasone/Flonase, Nasacort/triamcinolone or budesonide/Rhinocort each side once to twice daily for chronic control of inflammatory nasal and  sinus disease.  Routine audiologic testing should be considered but certainly would be necessary prior to any consideration of eustachian tube dilation for left ear fullness.  Comprehensive detailed high-resolution CT scanning of the sinuses would also be necessary prior to proceeding even with in office based balloon dilation surgery.  This is only recommended if symptoms are not well controlled with more routine saline and nasal steroids as outlined and discussed.      Return with any worsening of symptoms, failure to improve, or any other concerns for further evaluation and treatment.

## 2022-12-02 NOTE — PATIENT INSTRUCTIONS
The indications to proceed with balloon dilation sinuplasty or definitive endoscopic sinus surgery in the OR are not clearly met at this point.  It very well might be an appropriate next step if symptoms are not better control with more regular use of saline/sinus rinses such as NeilMed which may be better tolerated, and 1 spray of nasal steroid such as fluticasone/Flonase, Nasacort/triamcinolone or budesonide/Rhinocort each side once to twice daily for chronic control of inflammatory nasal and sinus disease.  Routine audiologic testing should be considered but certainly would be necessary prior to any consideration of eustachian tube dilation for left ear fullness.  Comprehensive detailed high-resolution CT scanning of the sinuses would also be necessary prior to proceeding even with in office based balloon dilation surgery.  This is only recommended if symptoms are not well controlled with more routine saline and nasal steroids as outlined and discussed.      Return with any worsening of symptoms, failure to improve, or any other concerns for further evaluation and treatment.    NASAL SALINE    Still saline comes in many preparations including sprays/mists, gels, and rinses.  Different preparations served different purposes.  Saline spray helps to briefly moisturize the nose and help clear mucus.  Saline gels coat the nose for longer protective benefit of keeping the linings the nose moist.  Saline rinses clear the nose and sinuses and a more thorough way in her best used for significant postnasal drip and sinus complaints.  A combination of saline sprays/mists, gels and rinses should be used to address routine nasal clearing and dryness issues as well as flushing for better control of allergy and postnasal drip symptoms.  There is no real risk of over use of nasal saline products.  Saline sprays do not have any of the potential rebound or addiction of nasal decongestant sprays.  Nasal saline sprays and rinses  should be used prior to the application of any medicated nasal sprays such as nasal steroids or nasal antihistamine sprays.        INTRANASAL STEROID SPRAYS      Intranasal steroid sprays are available both by prescription and over-the-counter both in generic and brand name preparations.  They are all very similar in efficacy and side effect profiles.  Over-the-counter and prescription intranasal steroids include fluticasone propionate (Flonase), fluticasone furoate (Sensimist), triamcinolone (Nasacort), and budesonide (Rhinocort).  While these medications are available as prescriptions as well there are few nasal steroids in her available by prescription only and include mometasone (Nasonex), flunisolide (Nasarel), and beclomethasone (QNASL).    Nasal steroids or the foundation of treatment of both allergy and other inflammatory conditions of the nose and sinuses.  They are safe for regular use and while there are many side effects listed most of these are steroid class effects and not typically encountered.  Typical side effects include dryness and even ulceration and bleeding of the nose.  These side effects can be minimized by proper application and proper moisturization with saline and saline gel.    Sometimes changing between 1 brand of nasal steroid and another can result in improved control of symptoms especially after long term use of one particular nasal steroid.    Proper application of the nasal steroid spray is accomplished by spraying towards the I/ear on the same side with the tip of the superior just barely inside the nostril with the chin slightly downward.  Any dripping should be gently inhaled not sniff test backwards into the throat.  Labeled instructions should be followed.

## 2023-02-22 ENCOUNTER — PATIENT MESSAGE (OUTPATIENT)
Dept: OBSTETRICS AND GYNECOLOGY | Facility: CLINIC | Age: 38
End: 2023-02-22
Payer: COMMERCIAL

## 2023-02-22 ENCOUNTER — TELEPHONE (OUTPATIENT)
Dept: OBSTETRICS AND GYNECOLOGY | Facility: CLINIC | Age: 38
End: 2023-02-22
Payer: COMMERCIAL

## 2023-02-22 NOTE — TELEPHONE ENCOUNTER
Spoke with pt regarding left message. Pt didn't want next available at [facility] or [facility] because it too far out. Suggested [facility] walk in clinic and pt denied. No further questions.   ----- Message from Tung Sanabria sent at 2/22/2023  9:28 AM CST -----  Patient will like to make an appt. Patient states she has a lump she is concerned about. Patient can be reached at [phone].

## 2023-02-27 ENCOUNTER — OFFICE VISIT (OUTPATIENT)
Dept: OBSTETRICS AND GYNECOLOGY | Facility: CLINIC | Age: 38
End: 2023-02-27
Payer: COMMERCIAL

## 2023-02-27 VITALS
WEIGHT: 119.5 LBS | BODY MASS INDEX: 23.46 KG/M2 | DIASTOLIC BLOOD PRESSURE: 72 MMHG | HEIGHT: 60 IN | SYSTOLIC BLOOD PRESSURE: 118 MMHG

## 2023-02-27 DIAGNOSIS — N90.7 INCLUSION CYST OF VULVA: ICD-10-CM

## 2023-02-27 DIAGNOSIS — F32.81 PMDD (PREMENSTRUAL DYSPHORIC DISORDER): Primary | ICD-10-CM

## 2023-02-27 DIAGNOSIS — Z80.3 FAMILY HISTORY OF BREAST CANCER: ICD-10-CM

## 2023-02-27 DIAGNOSIS — N89.8 VAGINAL IRRITATION: ICD-10-CM

## 2023-02-27 PROCEDURE — 3074F SYST BP LT 130 MM HG: CPT | Mod: CPTII,S$GLB,, | Performed by: STUDENT IN AN ORGANIZED HEALTH CARE EDUCATION/TRAINING PROGRAM

## 2023-02-27 PROCEDURE — 3078F PR MOST RECENT DIASTOLIC BLOOD PRESSURE < 80 MM HG: ICD-10-PCS | Mod: CPTII,S$GLB,, | Performed by: STUDENT IN AN ORGANIZED HEALTH CARE EDUCATION/TRAINING PROGRAM

## 2023-02-27 PROCEDURE — 99214 PR OFFICE/OUTPT VISIT, EST, LEVL IV, 30-39 MIN: ICD-10-PCS | Mod: S$GLB,,, | Performed by: STUDENT IN AN ORGANIZED HEALTH CARE EDUCATION/TRAINING PROGRAM

## 2023-02-27 PROCEDURE — 81514 NFCT DS BV&VAGINITIS DNA ALG: CPT | Performed by: STUDENT IN AN ORGANIZED HEALTH CARE EDUCATION/TRAINING PROGRAM

## 2023-02-27 PROCEDURE — 99214 OFFICE O/P EST MOD 30 MIN: CPT | Mod: S$GLB,,, | Performed by: STUDENT IN AN ORGANIZED HEALTH CARE EDUCATION/TRAINING PROGRAM

## 2023-02-27 PROCEDURE — 3074F PR MOST RECENT SYSTOLIC BLOOD PRESSURE < 130 MM HG: ICD-10-PCS | Mod: CPTII,S$GLB,, | Performed by: STUDENT IN AN ORGANIZED HEALTH CARE EDUCATION/TRAINING PROGRAM

## 2023-02-27 PROCEDURE — 99999 PR PBB SHADOW E&M-EST. PATIENT-LVL III: CPT | Mod: PBBFAC,,, | Performed by: STUDENT IN AN ORGANIZED HEALTH CARE EDUCATION/TRAINING PROGRAM

## 2023-02-27 PROCEDURE — 1160F PR REVIEW ALL MEDS BY PRESCRIBER/CLIN PHARMACIST DOCUMENTED: ICD-10-PCS | Mod: CPTII,S$GLB,, | Performed by: STUDENT IN AN ORGANIZED HEALTH CARE EDUCATION/TRAINING PROGRAM

## 2023-02-27 PROCEDURE — 1159F PR MEDICATION LIST DOCUMENTED IN MEDICAL RECORD: ICD-10-PCS | Mod: CPTII,S$GLB,, | Performed by: STUDENT IN AN ORGANIZED HEALTH CARE EDUCATION/TRAINING PROGRAM

## 2023-02-27 PROCEDURE — 99999 PR PBB SHADOW E&M-EST. PATIENT-LVL III: ICD-10-PCS | Mod: PBBFAC,,, | Performed by: STUDENT IN AN ORGANIZED HEALTH CARE EDUCATION/TRAINING PROGRAM

## 2023-02-27 PROCEDURE — 3078F DIAST BP <80 MM HG: CPT | Mod: CPTII,S$GLB,, | Performed by: STUDENT IN AN ORGANIZED HEALTH CARE EDUCATION/TRAINING PROGRAM

## 2023-02-27 PROCEDURE — 1160F RVW MEDS BY RX/DR IN RCRD: CPT | Mod: CPTII,S$GLB,, | Performed by: STUDENT IN AN ORGANIZED HEALTH CARE EDUCATION/TRAINING PROGRAM

## 2023-02-27 PROCEDURE — 1159F MED LIST DOCD IN RCRD: CPT | Mod: CPTII,S$GLB,, | Performed by: STUDENT IN AN ORGANIZED HEALTH CARE EDUCATION/TRAINING PROGRAM

## 2023-02-27 PROCEDURE — 3008F BODY MASS INDEX DOCD: CPT | Mod: CPTII,S$GLB,, | Performed by: STUDENT IN AN ORGANIZED HEALTH CARE EDUCATION/TRAINING PROGRAM

## 2023-02-27 PROCEDURE — 3008F PR BODY MASS INDEX (BMI) DOCUMENTED: ICD-10-PCS | Mod: CPTII,S$GLB,, | Performed by: STUDENT IN AN ORGANIZED HEALTH CARE EDUCATION/TRAINING PROGRAM

## 2023-02-27 RX ORDER — DROSPIRENONE AND ETHINYL ESTRADIOL 0.02-3(28)
1 KIT ORAL DAILY
Qty: 28 TABLET | Refills: 12 | Status: SHIPPED | OUTPATIENT
Start: 2023-02-27 | End: 2023-04-27 | Stop reason: SDUPTHER

## 2023-03-01 ENCOUNTER — PATIENT MESSAGE (OUTPATIENT)
Dept: OBSTETRICS AND GYNECOLOGY | Facility: CLINIC | Age: 38
End: 2023-03-01
Payer: COMMERCIAL

## 2023-03-07 ENCOUNTER — TELEPHONE (OUTPATIENT)
Dept: GYNECOLOGIC ONCOLOGY | Facility: CLINIC | Age: 38
End: 2023-03-07
Payer: COMMERCIAL

## 2023-03-07 NOTE — TELEPHONE ENCOUNTER
----- Message from Ninfa Ortiz RN sent at 3/7/2023 10:09 AM CST -----  Regarding: FW: genetic testing  TE please.    Ninfa  ----- Message -----  From: Ally Yeung MD  Sent: 3/6/2023   7:28 AM CST  To: Ninfa Ortiz RN  Subject: genetic testing                                  Desires genetic testing due to hx breast cancer in several maternal relatives

## 2023-03-07 NOTE — TELEPHONE ENCOUNTER
Spoke with our patient about her schedule appointment she voiced understanding of the date, time and location. All questions answered appointment mail. Provider Scheduling Coord.  Gynecologic Oncology MA/PAR /Preceptor David Jarrell

## 2023-03-28 ENCOUNTER — LAB VISIT (OUTPATIENT)
Dept: LAB | Facility: OTHER | Age: 38
End: 2023-03-28
Attending: INTERNAL MEDICINE
Payer: COMMERCIAL

## 2023-03-28 ENCOUNTER — CLINICAL SUPPORT (OUTPATIENT)
Dept: GYNECOLOGIC ONCOLOGY | Facility: CLINIC | Age: 38
End: 2023-03-28
Payer: COMMERCIAL

## 2023-03-28 DIAGNOSIS — Z80.3 FAMILY HISTORY OF BREAST CANCER: Primary | ICD-10-CM

## 2023-03-28 DIAGNOSIS — Z80.3 FAMILY HISTORY OF BREAST CANCER: ICD-10-CM

## 2023-03-28 PROCEDURE — 36415 COLL VENOUS BLD VENIPUNCTURE: CPT | Performed by: OBSTETRICS & GYNECOLOGY

## 2023-03-28 NOTE — PROGRESS NOTES
Pt arrived unaccompanied, for tele-genetics appointment. Pt watched [employer] Education Tool Video necessary to obtain informed consent for genetic testing. Pt agreed to proceed with genetic counseling via telephone. Patient spoke to [employer] genetic counselor, personal/family history obtained. Pt provided verbal consent to proceed with genetic testing. Lab requisition, demographic information, insurance card and family pedigree/history report all printed, packaged in test kit and given to pt for lab appt. Pt expressed verbal understanding that our dept would reach out to her when results are received, approximately 2-4 weeks after insurance approval is obtained by [employer].

## 2023-04-18 ENCOUNTER — PATIENT MESSAGE (OUTPATIENT)
Dept: OBSTETRICS AND GYNECOLOGY | Facility: CLINIC | Age: 38
End: 2023-04-18
Payer: COMMERCIAL

## 2023-04-21 LAB — INTEGRATED BRAC ANALYSIS: NORMAL

## 2023-04-25 ENCOUNTER — PATIENT MESSAGE (OUTPATIENT)
Dept: OBSTETRICS AND GYNECOLOGY | Facility: CLINIC | Age: 38
End: 2023-04-25
Payer: COMMERCIAL

## 2023-04-27 ENCOUNTER — OFFICE VISIT (OUTPATIENT)
Dept: OBSTETRICS AND GYNECOLOGY | Facility: CLINIC | Age: 38
End: 2023-04-27
Payer: COMMERCIAL

## 2023-04-27 VITALS
DIASTOLIC BLOOD PRESSURE: 62 MMHG | BODY MASS INDEX: 23.32 KG/M2 | HEIGHT: 60 IN | SYSTOLIC BLOOD PRESSURE: 104 MMHG | WEIGHT: 118.81 LBS

## 2023-04-27 DIAGNOSIS — F32.81 PMDD (PREMENSTRUAL DYSPHORIC DISORDER): ICD-10-CM

## 2023-04-27 PROCEDURE — 3008F PR BODY MASS INDEX (BMI) DOCUMENTED: ICD-10-PCS | Mod: CPTII,S$GLB,, | Performed by: STUDENT IN AN ORGANIZED HEALTH CARE EDUCATION/TRAINING PROGRAM

## 2023-04-27 PROCEDURE — 1160F RVW MEDS BY RX/DR IN RCRD: CPT | Mod: CPTII,S$GLB,, | Performed by: STUDENT IN AN ORGANIZED HEALTH CARE EDUCATION/TRAINING PROGRAM

## 2023-04-27 PROCEDURE — 1159F PR MEDICATION LIST DOCUMENTED IN MEDICAL RECORD: ICD-10-PCS | Mod: CPTII,S$GLB,, | Performed by: STUDENT IN AN ORGANIZED HEALTH CARE EDUCATION/TRAINING PROGRAM

## 2023-04-27 PROCEDURE — 3078F PR MOST RECENT DIASTOLIC BLOOD PRESSURE < 80 MM HG: ICD-10-PCS | Mod: CPTII,S$GLB,, | Performed by: STUDENT IN AN ORGANIZED HEALTH CARE EDUCATION/TRAINING PROGRAM

## 2023-04-27 PROCEDURE — 99999 PR PBB SHADOW E&M-EST. PATIENT-LVL III: ICD-10-PCS | Mod: PBBFAC,,, | Performed by: STUDENT IN AN ORGANIZED HEALTH CARE EDUCATION/TRAINING PROGRAM

## 2023-04-27 PROCEDURE — 3078F DIAST BP <80 MM HG: CPT | Mod: CPTII,S$GLB,, | Performed by: STUDENT IN AN ORGANIZED HEALTH CARE EDUCATION/TRAINING PROGRAM

## 2023-04-27 PROCEDURE — 3074F PR MOST RECENT SYSTOLIC BLOOD PRESSURE < 130 MM HG: ICD-10-PCS | Mod: CPTII,S$GLB,, | Performed by: STUDENT IN AN ORGANIZED HEALTH CARE EDUCATION/TRAINING PROGRAM

## 2023-04-27 PROCEDURE — 99213 OFFICE O/P EST LOW 20 MIN: CPT | Mod: S$GLB,,, | Performed by: STUDENT IN AN ORGANIZED HEALTH CARE EDUCATION/TRAINING PROGRAM

## 2023-04-27 PROCEDURE — 3074F SYST BP LT 130 MM HG: CPT | Mod: CPTII,S$GLB,, | Performed by: STUDENT IN AN ORGANIZED HEALTH CARE EDUCATION/TRAINING PROGRAM

## 2023-04-27 PROCEDURE — 1160F PR REVIEW ALL MEDS BY PRESCRIBER/CLIN PHARMACIST DOCUMENTED: ICD-10-PCS | Mod: CPTII,S$GLB,, | Performed by: STUDENT IN AN ORGANIZED HEALTH CARE EDUCATION/TRAINING PROGRAM

## 2023-04-27 PROCEDURE — 1159F MED LIST DOCD IN RCRD: CPT | Mod: CPTII,S$GLB,, | Performed by: STUDENT IN AN ORGANIZED HEALTH CARE EDUCATION/TRAINING PROGRAM

## 2023-04-27 PROCEDURE — 99213 PR OFFICE/OUTPT VISIT, EST, LEVL III, 20-29 MIN: ICD-10-PCS | Mod: S$GLB,,, | Performed by: STUDENT IN AN ORGANIZED HEALTH CARE EDUCATION/TRAINING PROGRAM

## 2023-04-27 PROCEDURE — 99999 PR PBB SHADOW E&M-EST. PATIENT-LVL III: CPT | Mod: PBBFAC,,, | Performed by: STUDENT IN AN ORGANIZED HEALTH CARE EDUCATION/TRAINING PROGRAM

## 2023-04-27 PROCEDURE — 3008F BODY MASS INDEX DOCD: CPT | Mod: CPTII,S$GLB,, | Performed by: STUDENT IN AN ORGANIZED HEALTH CARE EDUCATION/TRAINING PROGRAM

## 2023-04-27 RX ORDER — DROSPIRENONE AND ETHINYL ESTRADIOL 0.02-3(28)
1 KIT ORAL DAILY
Qty: 28 TABLET | Refills: 12 | Status: SHIPPED | OUTPATIENT
Start: 2023-04-27 | End: 2023-08-01

## 2023-04-27 NOTE — PROGRESS NOTES
History & Physical  Gynecology      SUBJECTIVE:     Chief Complaint: Follow-up       History of Present Illness:  Shena Gomez is a 37 y.o.  seen today for follow up.  PMDD  - stopped after 4 days due to fear of adverse events specifically CVA listed in insert  - unchanged symptoms still with significant cyclical anxiety and mood changes  - Did not tolerate fluoxetine increase (?hives) in the past    Genetic testing  - [facility] testing done and negative; reviewed w pt    Vulvar cyst  - reports resolved    Review of patient's allergies indicates:   Allergen Reactions    Celestone [betamethasone] Palpitations       Past Medical History:   Diagnosis Date    Anxiety     Chronic sinusitis     Epigastric pain     Kidney stones     history    Nephrolithiasis     OAB (overactive bladder)      Past Surgical History:   Procedure Laterality Date    Benign neoplasm buttock skin Right 10/05/2022    CERVICAL CERCLAGE  2013     SECTION      CYSTOSCOPY N/A 2019    Procedure: CYSTOSCOPY;  Surgeon: Yokasta John MD;  Location: [facility];  Service: OB/GYN;  Laterality: N/A;    ESOPHAGOGASTRODUODENOSCOPY N/A 2019    Procedure: EGD (ESOPHAGOGASTRODUODENOSCOPY);  Surgeon: Ferdinand Cooney MD;  Location: [facility];  Service: General;  Laterality: N/A;    HYSTERECTOMY      LAPAROSCOPIC SALPINGECTOMY Bilateral 2019    Procedure: SALPINGECTOMY, LAPAROSCOPIC;  Surgeon: Yokasta John MD;  Location: [facility];  Service: OB/GYN;  Laterality: Bilateral;    LAPAROSCOPIC TOTAL HYSTERECTOMY N/A 2019    Procedure: HYSTERECTOMY, TOTAL, LAPAROSCOPIC;  Surgeon: Yokasta John MD;  Location: [facility];  Service: OB/GYN;  Laterality: N/A;    SOFT TISSUE BIOPSY Right 10/5/2022    Procedure: BIOPSY, SOFT TISSUE, right medial thigh/gluteal area;  Surgeon: Ferdinand Cooney MD;  Location: [facility]  Service: General;  Laterality: Right;    TUBAL LIGATION  2014     OB History           5    Para   4    Term   3       1    AB   1    Living   3         SAB   1    IAB        Ectopic        Multiple        Live Births   1               Family History   Problem Relation Age of Onset    Breast cancer Maternal Grandmother 68    Breast cancer Maternal Aunt 56    Esophageal cancer Maternal Aunt     Cancer Neg Hx     Colon cancer Neg Hx     Diabetes Neg Hx     Eclampsia Neg Hx     Miscarriages / Stillbirths Neg Hx     Hypertension Neg Hx     Ovarian cancer Neg Hx      labor Neg Hx     Stroke Neg Hx      Social History     Tobacco Use    Smoking status: Never    Smokeless tobacco: Never   Substance Use Topics    Alcohol use: Never     Alcohol/week: 0.0 standard drinks    Drug use: Never       Current Outpatient Medications   Medication Sig    azelastine HCl (ASTEPRO ALLERGY NASL) by Nasal route.    FLUoxetine 10 MG capsule Take 1 capsule (10 mg total) by mouth once daily.    drospirenone-ethinyl estradioL (BEATRIZ) 3-0.02 mg per tablet Take 1 tablet by mouth once daily.    montelukast (SINGULAIR) 10 mg tablet Take 1 tablet (10 mg total) by mouth every evening. (Patient not taking: Reported on 2022)    sumatriptan (IMITREX) 50 MG tablet Take 1 tablet (50 mg total) by mouth every 2 (two) hours as needed for Migraine (max 200 mg).     No current facility-administered medications for this visit.         Review of Systems:  Review of Systems   Constitutional:  Negative for chills and fever.   Respiratory:  Negative for cough, shortness of breath and wheezing.    Cardiovascular:  Negative for chest pain, palpitations and leg swelling.   Gastrointestinal:  Negative for abdominal pain, nausea and vomiting.   Endocrine: Negative for diabetes, hyperthyroidism and hypothyroidism.   Genitourinary:  Positive for menstrual problem. Negative for dysuria, pelvic pain, vaginal bleeding and vaginal pain.   Musculoskeletal:  Negative for joint swelling and myalgias.   Integumentary:  Positive for  acne. Negative for rash.   Neurological:  Negative for vertigo, seizures, syncope and numbness.   Hematological:  Does not bruise/bleed easily.   Psychiatric/Behavioral:  Negative for depression. The patient is nervous/anxious.       OBJECTIVE:     Physical Exam:  Physical Exam  Constitutional:       General: She is not in acute distress.     Appearance: She is well-developed.   HENT:      Head: Normocephalic and atraumatic.   Eyes:      Extraocular Movements: Extraocular movements intact.      Conjunctiva/sclera: Conjunctivae normal.   Pulmonary:      Effort: Pulmonary effort is normal. No respiratory distress.   Musculoskeletal:         General: Normal range of motion.      Right lower leg: No edema.      Left lower leg: No edema.   Skin:     General: Skin is warm and dry.   Neurological:      Mental Status: She is alert and oriented to person, place, and time.   Psychiatric:         Mood and Affect: Mood normal.         Behavior: Behavior normal.         ASSESSMENT:       ICD-10-CM ICD-9-CM    1. PMDD (premenstrual dysphoric disorder)  F32.81 625.4 drospirenone-ethinyl estradioL (BEATRIZ) 3-0.02 mg per tablet             Plan:      Shena was seen today for follow-up.    Diagnoses and all orders for this visit:    PMDD (premenstrual dysphoric disorder)  -     drospirenone-ethinyl estradioL (BEATRIZ) 3-0.02 mg per tablet; Take 1 tablet by mouth once daily.    Reassured pt based on her history she is not at increased risk for adverse event and that the formulation we have prescribed is very low dose.  Says she feels better about re-trying.    F/U for virtual 8-12 weeks for PMS/PMDD    Ally Siddiqui

## 2023-04-28 ENCOUNTER — PATIENT MESSAGE (OUTPATIENT)
Dept: INTERNAL MEDICINE | Facility: CLINIC | Age: 38
End: 2023-04-28
Payer: COMMERCIAL

## 2023-05-09 ENCOUNTER — TELEPHONE (OUTPATIENT)
Dept: GYNECOLOGIC ONCOLOGY | Facility: CLINIC | Age: 38
End: 2023-05-09
Payer: COMMERCIAL

## 2023-05-09 NOTE — TELEPHONE ENCOUNTER
Informed patient no clinically significant mutations identified through recent genetic testing. She was educated on her variants of uncertain significance, and their negative interpretation, based on NCCN guidelines. I discussed the importance of maintaining an open line of communication with her established providers, for all future concerning symptoms. We also discussed the need to continue any screening measures and routine self/provider exams, despite negative results. Results mailed to address on file after patient agreed this was ok and confirmed address. The patient was informed of [facility] number on last page for additional questions regarding results, once received via mail. Pt expressed verbal understanding to all information provided. Message sent to Dr. Yeung regarding patient results.

## 2023-05-17 ENCOUNTER — PATIENT MESSAGE (OUTPATIENT)
Dept: INTERNAL MEDICINE | Facility: CLINIC | Age: 38
End: 2023-05-17
Payer: COMMERCIAL

## 2023-05-18 ENCOUNTER — PATIENT MESSAGE (OUTPATIENT)
Dept: PRIMARY CARE CLINIC | Facility: CLINIC | Age: 38
End: 2023-05-18
Payer: COMMERCIAL

## 2023-06-22 ENCOUNTER — PATIENT MESSAGE (OUTPATIENT)
Dept: PRIMARY CARE CLINIC | Facility: CLINIC | Age: 38
End: 2023-06-22
Payer: COMMERCIAL

## 2023-06-23 ENCOUNTER — PATIENT MESSAGE (OUTPATIENT)
Dept: PRIMARY CARE CLINIC | Facility: CLINIC | Age: 38
End: 2023-06-23
Payer: COMMERCIAL

## 2023-06-23 RX ORDER — CYCLOBENZAPRINE HCL 5 MG
5 TABLET ORAL 3 TIMES DAILY PRN
Qty: 30 TABLET | Refills: 2 | Status: SHIPPED | OUTPATIENT
Start: 2023-06-23 | End: 2023-07-12

## 2023-07-12 ENCOUNTER — OFFICE VISIT (OUTPATIENT)
Dept: NEUROLOGY | Facility: CLINIC | Age: 38
End: 2023-07-12
Payer: COMMERCIAL

## 2023-07-12 VITALS — HEIGHT: 60 IN | BODY MASS INDEX: 23.32 KG/M2 | WEIGHT: 118.81 LBS

## 2023-07-12 DIAGNOSIS — G44.86 CERVICOGENIC HEADACHE: ICD-10-CM

## 2023-07-12 DIAGNOSIS — G43.009 MIGRAINE WITHOUT AURA AND WITHOUT STATUS MIGRAINOSUS, NOT INTRACTABLE: Primary | ICD-10-CM

## 2023-07-12 PROCEDURE — 99204 PR OFFICE/OUTPT VISIT, NEW, LEVL IV, 45-59 MIN: ICD-10-PCS | Mod: S$GLB,,, | Performed by: STUDENT IN AN ORGANIZED HEALTH CARE EDUCATION/TRAINING PROGRAM

## 2023-07-12 PROCEDURE — 1159F PR MEDICATION LIST DOCUMENTED IN MEDICAL RECORD: ICD-10-PCS | Mod: CPTII,S$GLB,, | Performed by: STUDENT IN AN ORGANIZED HEALTH CARE EDUCATION/TRAINING PROGRAM

## 2023-07-12 PROCEDURE — 3008F PR BODY MASS INDEX (BMI) DOCUMENTED: ICD-10-PCS | Mod: CPTII,S$GLB,, | Performed by: STUDENT IN AN ORGANIZED HEALTH CARE EDUCATION/TRAINING PROGRAM

## 2023-07-12 PROCEDURE — 1159F MED LIST DOCD IN RCRD: CPT | Mod: CPTII,S$GLB,, | Performed by: STUDENT IN AN ORGANIZED HEALTH CARE EDUCATION/TRAINING PROGRAM

## 2023-07-12 PROCEDURE — 99999 PR PBB SHADOW E&M-EST. PATIENT-LVL III: CPT | Mod: PBBFAC,,, | Performed by: STUDENT IN AN ORGANIZED HEALTH CARE EDUCATION/TRAINING PROGRAM

## 2023-07-12 PROCEDURE — 3008F BODY MASS INDEX DOCD: CPT | Mod: CPTII,S$GLB,, | Performed by: STUDENT IN AN ORGANIZED HEALTH CARE EDUCATION/TRAINING PROGRAM

## 2023-07-12 PROCEDURE — 99999 PR PBB SHADOW E&M-EST. PATIENT-LVL III: ICD-10-PCS | Mod: PBBFAC,,, | Performed by: STUDENT IN AN ORGANIZED HEALTH CARE EDUCATION/TRAINING PROGRAM

## 2023-07-12 PROCEDURE — 99204 OFFICE O/P NEW MOD 45 MIN: CPT | Mod: S$GLB,,, | Performed by: STUDENT IN AN ORGANIZED HEALTH CARE EDUCATION/TRAINING PROGRAM

## 2023-07-12 RX ORDER — TIZANIDINE 2 MG/1
2 TABLET ORAL NIGHTLY PRN
Qty: 30 TABLET | Refills: 2 | Status: SHIPPED | OUTPATIENT
Start: 2023-07-12 | End: 2023-07-12

## 2023-07-12 RX ORDER — TIZANIDINE 2 MG/1
2 TABLET ORAL EVERY 12 HOURS
Qty: 60 TABLET | Refills: 3 | Status: SHIPPED | OUTPATIENT
Start: 2023-07-12 | End: 2023-09-15

## 2023-07-12 RX ORDER — TIZANIDINE 2 MG/1
2 TABLET ORAL EVERY 12 HOURS
Qty: 60 TABLET | Refills: 3 | Status: SHIPPED | OUTPATIENT
Start: 2023-07-12 | End: 2023-07-12

## 2023-07-12 RX ORDER — TIZANIDINE 2 MG/1
2 TABLET ORAL NIGHTLY PRN
Qty: 15 TABLET | Refills: 1 | Status: SHIPPED | OUTPATIENT
Start: 2023-07-12 | End: 2023-07-12

## 2023-07-12 RX ORDER — TOPIRAMATE 25 MG/1
25 TABLET ORAL 2 TIMES DAILY
Qty: 60 TABLET | Refills: 3 | Status: SHIPPED | OUTPATIENT
Start: 2023-07-12 | End: 2023-07-12

## 2023-07-12 NOTE — PROGRESS NOTES
Neurology Clinic Note      Date: 7/12/23  Patient Name: Shena Gomez   MRN: [id number]   PCP: Dixon Anderson  Referring Provider: Self, Aaareferral    Assessment and Plan:   Shena Gomez is a 37 y.o. female presenting for evaluation of headaches with some migrainous features with some features to suggest a cervicogenic headaches.    TCAs and SNRIs contraindicated as she is on Fluoxetine - risk of serotonin syndrome.  Topiramate contraindicated as she has a history of nephrolithiasis.  Trial of Xanaflex 2mg q12.   X-ray of the cervical spine ordered for evaluation of any degenerative changes in the cervical spine. Her exam is normal and no evidence to suggest a radiculopathy or myelopathy.    If no response, will consider a trial of CGRP monoclonal antibodies or CGRP antagonists.     RTC 2 months.      Problem List Items Addressed This Visit    None  Visit Diagnoses       Migraine without aura and without status migrainosus, not intractable    -  Primary    Relevant Medications    tiZANidine (ZANAFLEX) 2 MG tablet    Cervicogenic headache        Relevant Medications    tiZANidine (ZANAFLEX) 2 MG tablet    Other Relevant Orders    X-Ray Cervical Spine AP And Lateral              Subjective:          HPI:   Ms. Shena Gomez is a 37 y.o. female with a history of nephrolithiasis presenting for evaluation of headaches.    Started having headaches around 2 years ago.  Predominantly left-sided, radiating from the occipital region to the temporal and frontal region and associated with ear pain.  Over the last 2 weeks, her right side has also gotten involved, but mostly confined to the occipital region.  No clear triggers.  Tends to occur towards the evening and resolves by the next morning.  Frequency is around 10-15/month.  Nausea and photophobia.  Denies any phonophobia.  Was initially diagnosed with migraines and prescribed Imitrex which did not help.  She was then told that she had a pinched nerve in her neck  and prescribed Flexeril which has helped although she was concerned about drowsiness.    Family history of migraines in her sister.    Her other medications include fluoxetine which she takes for anxiety.    PAST MEDICAL HISTORY:  Past Medical History:   Diagnosis Date    Anxiety     Chronic sinusitis     Epigastric pain     Kidney stones     history    Nephrolithiasis     OAB (overactive bladder)        PAST SURGICAL HISTORY:  Past Surgical History:   Procedure Laterality Date    Benign neoplasm buttock skin Right 10/05/2022    CERVICAL CERCLAGE  2013     SECTION      CYSTOSCOPY N/A 2019    Procedure: CYSTOSCOPY;  Surgeon: Yokasta John MD;  Location: [facility];  Service: OB/GYN;  Laterality: N/A;    ESOPHAGOGASTRODUODENOSCOPY N/A 2019    Procedure: EGD (ESOPHAGOGASTRODUODENOSCOPY);  Surgeon: Ferdinand Cooney MD;  Location[facility];  Service: General;  Laterality: N/A;    HYSTERECTOMY      LAPAROSCOPIC SALPINGECTOMY Bilateral 2019    Procedure: SALPINGECTOMY, LAPAROSCOPIC;  Surgeon: Yokasta John MD;  Location: [facility];  Service: OB/GYN;  Laterality: Bilateral;    LAPAROSCOPIC TOTAL HYSTERECTOMY N/A 2019    Procedure: HYSTERECTOMY, TOTAL, LAPAROSCOPIC;  Surgeon: Yokasta John MD;  Location: [facility];  Service: OB/GYN;  Laterality: N/A;    SOFT TISSUE BIOPSY Right 10/5/2022    Procedure: BIOPSY, SOFT TISSUE, right medial thigh/gluteal area;  Surgeon: Ferdinand Cooney MD;  Location: [facility];  Service: General;  Laterality: Right;    TUBAL LIGATION  2014       CURRENT MEDS:  Current Outpatient Medications   Medication Sig Dispense Refill    azelastine HCl (ASTEPRO ALLERGY NASL) by Nasal route.      FLUoxetine 10 MG capsule Take 1 capsule (10 mg total) by mouth once daily. 30 capsule 11    montelukast (SINGULAIR) 10 mg tablet Take 1 tablet (10 mg total) by mouth every evening. 30 tablet 2    drospirenone-ethinyl estradioL (BEATRIZ) 3-0.02  mg per tablet Take 1 tablet by mouth once daily. 28 tablet 12    sumatriptan (IMITREX) 50 MG tablet Take 1 tablet (50 mg total) by mouth every 2 (two) hours as needed for Migraine (max 200 mg). 30 tablet 0    tiZANidine (ZANAFLEX) 2 MG tablet Take 1 tablet (2 mg total) by mouth every 12 (twelve) hours. 60 tablet 3     No current facility-administered medications for this visit.       ALLERGIES:  Review of patient's allergies indicates:   Allergen Reactions    Celestone [betamethasone] Palpitations       FAMILY HISTORY:  Family History   Problem Relation Age of Onset    Breast cancer Maternal Grandmother 68    Breast cancer Maternal Aunt 56    Esophageal cancer Maternal Aunt     Cancer Neg Hx     Colon cancer Neg Hx     Diabetes Neg Hx     Eclampsia Neg Hx     Miscarriages / Stillbirths Neg Hx     Hypertension Neg Hx     Ovarian cancer Neg Hx      labor Neg Hx     Stroke Neg Hx        SOCIAL HISTORY:  Social History     Tobacco Use    Smoking status: Never    Smokeless tobacco: Never   Substance Use Topics    Alcohol use: Never     Alcohol/week: 0.0 standard drinks    Drug use: Never       Review of Systems:  12 system review of systems is negative except for the symptoms mentioned in HPI.      Objective:     Vitals:    23 1011   Weight: 53.9 kg (118 lb 13.3 oz)   Height: 5' (1.524 m)     General: NAD, well nourished   Eyes: no tearing, discharge, no erythema   Neck: Supple, full range of motion  Cardiovascular: Warm and well perfused  Lungs: Normal work of breathing  Skin: No rash, lesions, or breakdown on exposed skin  Psychiatry: Mood and affect are appropriate     Physical Exam  Eyes:      Extraocular Movements: EOM normal.   Musculoskeletal:      Cervical back: No pain with movement or muscular tenderness. Normal range of motion.   Neurological:      Mental Status: She is oriented to person, place, and time.      Coordination: Finger-Nose-Finger Test normal.      Gait: Gait is intact.      Deep  Tendon Reflexes:      Reflex Scores:       Bicep reflexes are 2+ on the right side and 2+ on the left side.       Brachioradialis reflexes are 2+ on the right side and 2+ on the left side.       Patellar reflexes are 2+ on the right side and 2+ on the left side.  Psychiatric:         Speech: Speech normal.     NEUROLOGICAL EXAMINATION:     MENTAL STATUS   Oriented to person, place, and time.   Follows 2 step commands.   Speech: speech is normal   Level of consciousness: alert    CRANIAL NERVES     CN II   Visual fields full to confrontation.     CN III, IV, VI   Extraocular motions are normal.   Nystagmus: none   Ophthalmoparesis: none    CN V   Facial sensation intact.     CN VII   Facial expression full, symmetric.     CN XI   CN XI normal.     CN XII   CN XII normal.     MOTOR EXAM   Right arm pronator drift: absent  Left arm pronator drift: absent    Strength   Right deltoid: 5/5  Left deltoid: 5/5  Right biceps: 5/5  Left biceps: 5/5  Right triceps: 5/5  Left triceps: 5/5  Right wrist flexion: 5/5  Left wrist flexion: 5/5  Right wrist extension: 5/5  Left wrist extension: 5/5  Right interossei: 5/5  Left interossei: 5/5  Right iliopsoas: 5/5  Left iliopsoas: 5/5  Right quadriceps: 5/5  Left quadriceps: 5/5  Right hamstrin/5  Left hamstrin/5  Right glutei: 5/5  Left glutei: 5/5  Right anterior tibial: 5/5  Left anterior tibial: 5/5  Right gastroc: 5/5  Left gastroc: 5/5    REFLEXES     Reflexes   Right brachioradialis: 2+  Left brachioradialis: 2+  Right biceps: 2+  Left biceps: 2+  Right patellar: 2+  Left patellar: 2+  Right plantar: normal  Left plantar: normal    SENSORY EXAM   Light touch normal.     GAIT AND COORDINATION     Gait  Gait: normal     Coordination   Finger to nose coordination: normal    Tremor   Intention tremor: absent      Images:    Other Studies:          Pablo Caldwell MD  Department of Neurology  [facility]

## 2023-07-13 ENCOUNTER — PATIENT MESSAGE (OUTPATIENT)
Dept: NEUROLOGY | Facility: CLINIC | Age: 38
End: 2023-07-13
Payer: COMMERCIAL

## 2023-07-13 ENCOUNTER — TELEPHONE (OUTPATIENT)
Dept: NEUROLOGY | Facility: CLINIC | Age: 38
End: 2023-07-13
Payer: COMMERCIAL

## 2023-07-13 NOTE — TELEPHONE ENCOUNTER
Spoke w/ Betty from [facility] in regrads to Zanaflex Rx that is showing (transferred failed). Betty states she had to rerun the RX due to pt receiving a different dosage previously. RX has been approved per Betty pharm tech

## 2023-07-31 ENCOUNTER — PATIENT MESSAGE (OUTPATIENT)
Dept: PRIMARY CARE CLINIC | Facility: CLINIC | Age: 38
End: 2023-07-31
Payer: COMMERCIAL

## 2023-07-31 ENCOUNTER — PATIENT MESSAGE (OUTPATIENT)
Dept: OTOLARYNGOLOGY | Facility: CLINIC | Age: 38
End: 2023-07-31
Payer: COMMERCIAL

## 2023-08-01 ENCOUNTER — OFFICE VISIT (OUTPATIENT)
Dept: PRIMARY CARE CLINIC | Facility: CLINIC | Age: 38
End: 2023-08-01
Payer: COMMERCIAL

## 2023-08-01 VITALS
RESPIRATION RATE: 18 BRPM | BODY MASS INDEX: 23.67 KG/M2 | HEIGHT: 60 IN | DIASTOLIC BLOOD PRESSURE: 70 MMHG | WEIGHT: 120.56 LBS | HEART RATE: 88 BPM | SYSTOLIC BLOOD PRESSURE: 116 MMHG | TEMPERATURE: 98 F | OXYGEN SATURATION: 96 %

## 2023-08-01 DIAGNOSIS — J01.01 ACUTE RECURRENT MAXILLARY SINUSITIS: Primary | ICD-10-CM

## 2023-08-01 PROCEDURE — 3078F DIAST BP <80 MM HG: CPT | Mod: CPTII,S$GLB,, | Performed by: FAMILY MEDICINE

## 2023-08-01 PROCEDURE — 1159F MED LIST DOCD IN RCRD: CPT | Mod: CPTII,S$GLB,, | Performed by: FAMILY MEDICINE

## 2023-08-01 PROCEDURE — 99214 PR OFFICE/OUTPT VISIT, EST, LEVL IV, 30-39 MIN: ICD-10-PCS | Mod: S$GLB,,, | Performed by: FAMILY MEDICINE

## 2023-08-01 PROCEDURE — 99999 PR PBB SHADOW E&M-EST. PATIENT-LVL III: CPT | Mod: PBBFAC,,, | Performed by: FAMILY MEDICINE

## 2023-08-01 PROCEDURE — 99214 OFFICE O/P EST MOD 30 MIN: CPT | Mod: S$GLB,,, | Performed by: FAMILY MEDICINE

## 2023-08-01 PROCEDURE — 99999 PR PBB SHADOW E&M-EST. PATIENT-LVL III: ICD-10-PCS | Mod: PBBFAC,,, | Performed by: FAMILY MEDICINE

## 2023-08-01 PROCEDURE — 3008F BODY MASS INDEX DOCD: CPT | Mod: CPTII,S$GLB,, | Performed by: FAMILY MEDICINE

## 2023-08-01 PROCEDURE — 3074F PR MOST RECENT SYSTOLIC BLOOD PRESSURE < 130 MM HG: ICD-10-PCS | Mod: CPTII,S$GLB,, | Performed by: FAMILY MEDICINE

## 2023-08-01 PROCEDURE — 1159F PR MEDICATION LIST DOCUMENTED IN MEDICAL RECORD: ICD-10-PCS | Mod: CPTII,S$GLB,, | Performed by: FAMILY MEDICINE

## 2023-08-01 PROCEDURE — 3078F PR MOST RECENT DIASTOLIC BLOOD PRESSURE < 80 MM HG: ICD-10-PCS | Mod: CPTII,S$GLB,, | Performed by: FAMILY MEDICINE

## 2023-08-01 PROCEDURE — 3008F PR BODY MASS INDEX (BMI) DOCUMENTED: ICD-10-PCS | Mod: CPTII,S$GLB,, | Performed by: FAMILY MEDICINE

## 2023-08-01 PROCEDURE — 3074F SYST BP LT 130 MM HG: CPT | Mod: CPTII,S$GLB,, | Performed by: FAMILY MEDICINE

## 2023-08-01 PROCEDURE — 1160F RVW MEDS BY RX/DR IN RCRD: CPT | Mod: CPTII,S$GLB,, | Performed by: FAMILY MEDICINE

## 2023-08-01 PROCEDURE — 1160F PR REVIEW ALL MEDS BY PRESCRIBER/CLIN PHARMACIST DOCUMENTED: ICD-10-PCS | Mod: CPTII,S$GLB,, | Performed by: FAMILY MEDICINE

## 2023-08-01 RX ORDER — PREDNISONE 20 MG/1
20 TABLET ORAL DAILY
Qty: 5 TABLET | Refills: 0 | Status: SHIPPED | OUTPATIENT
Start: 2023-08-01 | End: 2023-08-11 | Stop reason: ALTCHOICE

## 2023-08-01 RX ORDER — AMOXICILLIN AND CLAVULANATE POTASSIUM 875; 125 MG/1; MG/1
1 TABLET, FILM COATED ORAL 2 TIMES DAILY
Qty: 20 TABLET | Refills: 0 | Status: SHIPPED | OUTPATIENT
Start: 2023-08-01 | End: 2023-08-11 | Stop reason: ALTCHOICE

## 2023-08-01 NOTE — PROGRESS NOTES
Subjective:       Patient ID: Shena Gomez is a 37 y.o. female.    Chief Complaint: Sinus Problem and Ear Fullness (Left ear)    Sinus Problem  This is a recurrent problem. The current episode started in the past 7 days. The problem has been gradually worsening since onset. There has been no fever. Associated symptoms include congestion, ear pain, headaches and sinus pressure. Pertinent negatives include no chills or shortness of breath. Treatments tried: navage. The treatment provided mild relief.     Review of Systems   Constitutional:  Negative for chills and fever.   HENT:  Positive for congestion, ear pain and sinus pressure.    Respiratory:  Negative for shortness of breath.    Allergic/Immunologic: Negative for immunocompromised state.   Neurological:  Positive for headaches.       Objective:      Vitals:    08/01/23 1340   BP: 116/70   BP Location: Right arm   Patient Position: Sitting   BP Method: Medium (Manual)   Pulse: 88   Resp: 18   Temp: 97.6 °F (36.4 °C)   TempSrc: Temporal   SpO2: 96%   Weight: 54.7 kg (120 lb 9.5 oz)   Height: 5' (1.524 m)     BP Readings from Last 5 Encounters:   08/01/23 116/70   04/27/23 104/62   02/27/23 118/72   12/02/22 100/64   11/11/22 107/71     Wt Readings from Last 5 Encounters:   08/01/23 54.7 kg (120 lb 9.5 oz)   07/12/23 53.9 kg (118 lb 13.3 oz)   04/27/23 53.9 kg (118 lb 13.3 oz)   02/27/23 54.2 kg (119 lb 7.8 oz)   12/02/22 54.3 kg (119 lb 11.4 oz)     Physical Exam  Vitals and nursing note reviewed.   Constitutional:       Appearance: She is well-developed.   HENT:      Head: Normocephalic and atraumatic.      Right Ear: Tympanic membrane normal.      Left Ear: Tympanic membrane normal.      Nose:      Right Sinus: Maxillary sinus tenderness present.      Left Sinus: Maxillary sinus tenderness present.   Eyes:      Pupils: Pupils are equal, round, and reactive to light.   Neck:      Vascular: No JVD.   Cardiovascular:      Rate and Rhythm: Normal rate and  regular rhythm.      Heart sounds: Normal heart sounds.   Pulmonary:      Effort: Pulmonary effort is normal.      Breath sounds: Normal breath sounds.   Musculoskeletal:      Cervical back: Neck supple.   Skin:     General: Skin is warm and dry.   Neurological:      Mental Status: She is alert and oriented to person, place, and time.         Lab Results   Component Value Date    WBC 8.15 07/25/2022    HGB 14.6 07/25/2022    HCT 42.4 07/25/2022     07/25/2022    CHOL 206 (H) 07/25/2022    TRIG 80 07/25/2022    HDL 53 07/25/2022    ALT 9 (L) 07/25/2022    AST 18 07/25/2022     07/25/2022    K 3.9 07/25/2022     07/25/2022    CREATININE 0.8 07/25/2022    BUN 17 07/25/2022    CO2 21 (L) 07/25/2022    TSH 1.51 01/20/2021    HGBA1C 4.7 07/25/2022      Assessment:       1. Acute recurrent maxillary sinusitis        Plan:       Acute recurrent maxillary sinusitis  -     amoxicillin-clavulanate 875-125mg (AUGMENTIN) 875-125 mg per tablet; Take 1 tablet by mouth 2 (two) times daily. for 10 days  Dispense: 20 tablet; Refill: 0  -     predniSONE (DELTASONE) 20 MG tablet; Take 1 tablet (20 mg total) by mouth once daily. for 5 days  Dispense: 5 tablet; Refill: 0      Medication List with Changes/Refills   New Medications    AMOXICILLIN-CLAVULANATE 875-125MG (AUGMENTIN) 875-125 MG PER TABLET    Take 1 tablet by mouth 2 (two) times daily. for 10 days    PREDNISONE (DELTASONE) 20 MG TABLET    Take 1 tablet (20 mg total) by mouth once daily. for 5 days   Current Medications    AZELASTINE HCL (ASTEPRO ALLERGY NASL)    by Nasal route.    FLUOXETINE 10 MG CAPSULE    Take 1 capsule (10 mg total) by mouth once daily.    MONTELUKAST (SINGULAIR) 10 MG TABLET    Take 1 tablet (10 mg total) by mouth every evening.    TIZANIDINE (ZANAFLEX) 2 MG TABLET    Take 1 tablet (2 mg total) by mouth every 12 (twelve) hours.   Discontinued Medications    DROSPIRENONE-ETHINYL ESTRADIOL (BEATRIZ) 3-0.02 MG PER TABLET    Take 1 tablet by  mouth once daily.    SUMATRIPTAN (IMITREX) 50 MG TABLET    Take 1 tablet (50 mg total) by mouth every 2 (two) hours as needed for Migraine (max 200 mg).

## 2023-08-02 ENCOUNTER — PATIENT MESSAGE (OUTPATIENT)
Dept: OTOLARYNGOLOGY | Facility: CLINIC | Age: 38
End: 2023-08-02
Payer: COMMERCIAL

## 2023-08-11 ENCOUNTER — OFFICE VISIT (OUTPATIENT)
Dept: OTOLARYNGOLOGY | Facility: CLINIC | Age: 38
End: 2023-08-11
Payer: COMMERCIAL

## 2023-08-11 VITALS
BODY MASS INDEX: 24.28 KG/M2 | SYSTOLIC BLOOD PRESSURE: 115 MMHG | HEIGHT: 60 IN | DIASTOLIC BLOOD PRESSURE: 69 MMHG | WEIGHT: 123.69 LBS | HEART RATE: 86 BPM

## 2023-08-11 DIAGNOSIS — Z86.69 HISTORY OF MIGRAINE: ICD-10-CM

## 2023-08-11 DIAGNOSIS — J01.01 ACUTE RECURRENT MAXILLARY SINUSITIS: ICD-10-CM

## 2023-08-11 DIAGNOSIS — J34.89 PAIN OF MAXILLARY SINUS: ICD-10-CM

## 2023-08-11 DIAGNOSIS — G50.1 ATYPICAL FACE PAIN: Primary | ICD-10-CM

## 2023-08-11 PROCEDURE — 99999 PR PBB SHADOW E&M-EST. PATIENT-LVL III: CPT | Mod: PBBFAC,,, | Performed by: OTOLARYNGOLOGY

## 2023-08-11 PROCEDURE — 1160F RVW MEDS BY RX/DR IN RCRD: CPT | Mod: CPTII,S$GLB,, | Performed by: OTOLARYNGOLOGY

## 2023-08-11 PROCEDURE — 3074F SYST BP LT 130 MM HG: CPT | Mod: CPTII,S$GLB,, | Performed by: OTOLARYNGOLOGY

## 2023-08-11 PROCEDURE — 3078F DIAST BP <80 MM HG: CPT | Mod: CPTII,S$GLB,, | Performed by: OTOLARYNGOLOGY

## 2023-08-11 PROCEDURE — 1159F PR MEDICATION LIST DOCUMENTED IN MEDICAL RECORD: ICD-10-PCS | Mod: CPTII,S$GLB,, | Performed by: OTOLARYNGOLOGY

## 2023-08-11 PROCEDURE — 3074F PR MOST RECENT SYSTOLIC BLOOD PRESSURE < 130 MM HG: ICD-10-PCS | Mod: CPTII,S$GLB,, | Performed by: OTOLARYNGOLOGY

## 2023-08-11 PROCEDURE — 3078F PR MOST RECENT DIASTOLIC BLOOD PRESSURE < 80 MM HG: ICD-10-PCS | Mod: CPTII,S$GLB,, | Performed by: OTOLARYNGOLOGY

## 2023-08-11 PROCEDURE — 99999 PR PBB SHADOW E&M-EST. PATIENT-LVL III: ICD-10-PCS | Mod: PBBFAC,,, | Performed by: OTOLARYNGOLOGY

## 2023-08-11 PROCEDURE — 99214 OFFICE O/P EST MOD 30 MIN: CPT | Mod: S$GLB,,, | Performed by: OTOLARYNGOLOGY

## 2023-08-11 PROCEDURE — 3008F PR BODY MASS INDEX (BMI) DOCUMENTED: ICD-10-PCS | Mod: CPTII,S$GLB,, | Performed by: OTOLARYNGOLOGY

## 2023-08-11 PROCEDURE — 1160F PR REVIEW ALL MEDS BY PRESCRIBER/CLIN PHARMACIST DOCUMENTED: ICD-10-PCS | Mod: CPTII,S$GLB,, | Performed by: OTOLARYNGOLOGY

## 2023-08-11 PROCEDURE — 3008F BODY MASS INDEX DOCD: CPT | Mod: CPTII,S$GLB,, | Performed by: OTOLARYNGOLOGY

## 2023-08-11 PROCEDURE — 1159F MED LIST DOCD IN RCRD: CPT | Mod: CPTII,S$GLB,, | Performed by: OTOLARYNGOLOGY

## 2023-08-11 PROCEDURE — 99214 PR OFFICE/OUTPT VISIT, EST, LEVL IV, 30-39 MIN: ICD-10-PCS | Mod: S$GLB,,, | Performed by: OTOLARYNGOLOGY

## 2023-08-11 NOTE — PATIENT INSTRUCTIONS
Etiology of right maxillary sinus pain is not clearly sinusitis and not specifically infectious sinusitis, but this is not excluded.  There is some history consistent with recurrent infectious sinusitis with recurrent green discharge.  There is also some non sinusitis driven potential causes including orthodontia and history of migraines.      As such given the patient just completed 7 of 10 days of the antibiotic and steroids we will proceed with CT scanning of the sinuses and high-resolution at this time.  Even if this is unchanged from before it maybe reasonable to proceed with right maxillary balloon dilation given the chronicity of the symptoms.  If however CT scanning shows more diffuse sinusitis more extensive sinus surgery including ethmoidectomy etc. in the OR may be appropriate.      Patient to contact me through the [facility] web after the CT scan is completed to arrange next steps in evaluation and treatment which may include balloon dilation in the office.    Return with any worsening of symptoms, failure to improve, or any other concerns for further evaluation and treatment.      Voice recognition software was used in the creation of this note/communication and any sound-alike errors which may have occurred from its use should be taken in context when interpreting.  If such errors prevent a clear understanding of the note/communication, please contact the office for clarification.

## 2023-08-11 NOTE — PROGRESS NOTES
[facility]    Subjective:      Patient: Shena Gomez Patient PCP: Dixon Anderson MD         :  1985     Sex:  female      MRN:  [id number]          Date of Visit: 2023      Chief Complaint: Sinus Problem (Pt here for f/u, pt states sinus problems still bothering her. Pt interested in the balloon sinu plasty. )        2023 established patient visit: Shena Gomez is a 37 y.o. female lifelong NON-smoker with a history of migraines for which she takes as needed Imitrex self-referred for facial pressure and pain into the teeth seen for initial consultation in 2022 with follow-up visit in 2022 after CT scan of the sinuses was reviewed not showing any appreciable ethmoid disease, hypoplastic/aplastic frontal sinuses, left maxillary or sphenoid sinus disease with only a right medially based maxillary retention cyst with narrowing versus occlusion of the right [facility].  No fluid levels.  No destructive process.  Given the normal appearance of the prior CT scan saline rinses, nasal steroids and routine audiologic testing recommended with high-resolution CT scanning and surgical planning deferred for any worsening of symptoms.  She returns today for further discussion of balloon sinuplasty as her sinuses are continuing to bother her presenting with facial pressure and pain.  There has been no subsequent sinus imaging.  She is compliant with her nasal steroids and rinses.  She is on Singulair as well.  She was treated with Augmentin and prednisone as recently as 10 days ago  Discomfort is located in the right maxillary sinus exclusively.  She has had intermittent pressure discomfort which relieves with blowing and discovery of purulent drainage.  She otherwise is not exhibiting classic symptoms of sinusitis with no nasal obstruction or any loss of or decrease in sense of smell during these infections.    2022 follow-up visit: Shena Gomez is a 37 y.o. female lifelong  NON-smoker with a history of migraines for which she takes as needed Imitrex self-referred for facial pressure and pain into the teeth seen for initial consultation 3 weeks ago with findings of active mucopurulent drainage from the right maxillary sinus/infundibulum cultured which grew MSSA.  Patient had been empirically started on cefdinir x3 weeks for prolonged chronic infection and failed prior balloon sinuplasty.  CT scanning of the sinuses previous prior to our last visit completed with Dr. Nuñez with no new imaging since our visit.    Low resolution CT scan in 3 planes on disc reviewed there is some significant motion artifact the only thing I can really notice is what appears to be an anterior medially based right maxillary retention cyst and what looks like significant narrowing versus possibly occlusion of the ostiomeatal complex on the right side.  I do not appreciate any significant ethmoid thickening or opacification.  The frontal sinuses are aplastic bilaterally.  Sphenoid sinuses are well pneumatized without any mucoperiosteal thickening and there was no contralateral left-sided appreciable mucoperiosteal maxillary sinus disease.      Patient has some difficulty with her Navage system causing left ear pressure.  Ear pressure on the left side is a common complaint for her in general.  She is being treated for infections of the sinuses perhaps 3 times per year and has not used saline rinses and nasal steroids on a routine prophylactic basis.    11/11/2022 initial patient consultation: Shena Gomez is a 38 y.o. female lifelong NON-smoker with a history of migraines for which she takes as needed Imitrex self-referred for facial pressure and pain into the teeth.  Told before this was sinusitis and recommended balloon sinuplasty.  No imaging for review.  Does not use intranasal steroids.  She does use a lavage as well as as needed Astelin nasal spray and Singulair nightly.    Previously seen by   Joaquin who reportedly recommended balloon dilation sinuplasty for chronic maxillary sinusitis.  She was seen by dentistry and told it was nothing to do with her teeth that she has this pressure and pain and right greater than left cheek distribution.  She is used multiple different nasal steroids Flonase and Nasacort without any improvement in her chronic persistent pressure and discomfort symptoms.  She gets intermittent green discharge seeing some as recently as yesterday.  No blood no ocular symptoms.  She reportedly had a CT scan done she believes at [facility] but there is no record of it on the database is on my review.  She gets transient improvement with antibiotics and prednisone but as soon as the therapy is over she is back to her standard pressure and discomfort right greater than left.    Review of Systems   Constitutional:  Positive for fatigue. Negative for fever.   HENT:  Positive for congestion, sinus pressure and sinus pain. Negative for dental problem, facial swelling, nosebleeds, rhinorrhea, sneezing and sore throat.    Eyes: Negative.    Respiratory: Negative.     Cardiovascular: Negative.    Gastrointestinal: Negative.    Endocrine: Negative.    Genitourinary: Negative.    Musculoskeletal: Negative.    Allergic/Immunologic: Negative.    Neurological: Negative.    Hematological: Negative.    Psychiatric/Behavioral: Negative.          Past Medical History  She has a past medical history of Anxiety, Chronic sinusitis, Epigastric pain, Kidney stones, Nephrolithiasis, and OAB (overactive bladder).    Family / Surgical / Social History  Her family history includes Breast cancer (age of onset: 56) in her maternal aunt; Breast cancer (age of onset: 68) in her maternal grandmother; Esophageal cancer in her maternal aunt.    Past Surgical History:   Procedure Laterality Date    Benign neoplasm buttock skin Right 10/05/2022    CERVICAL CERCLAGE  2013     SECTION      CYSTOSCOPY N/A 2019     Procedure: CYSTOSCOPY;  Surgeon: Yokasta John MD;  Location: [facility] OR;  Service: OB/GYN;  Laterality: N/A;    ESOPHAGOGASTRODUODENOSCOPY N/A 12/23/2019    Procedure: EGD (ESOPHAGOGASTRODUODENOSCOPY);  Surgeon: Ferdinand Cooney MD;  Location: [facility] ENDO;  Service: General;  Laterality: N/A;    HYSTERECTOMY      LAPAROSCOPIC SALPINGECTOMY Bilateral 08/27/2019    Procedure: SALPINGECTOMY, LAPAROSCOPIC;  Surgeon: Yokasta John MD;  Location: [facility] OR;  Service: OB/GYN;  Laterality: Bilateral;    LAPAROSCOPIC TOTAL HYSTERECTOMY N/A 08/27/2019    Procedure: HYSTERECTOMY, TOTAL, LAPAROSCOPIC;  Surgeon: Yokasta John MD;  Location: [facility] OR;  Service: OB/GYN;  Laterality: N/A;    SOFT TISSUE BIOPSY Right 10/5/2022    Procedure: BIOPSY, SOFT TISSUE, right medial thigh/gluteal area;  Surgeon: Ferdinand Cooney MD;  Location: [facility];  Service: General;  Laterality: Right;    TUBAL LIGATION  02/24/2014       Social History     Tobacco Use    Smoking status: Never    Smokeless tobacco: Never   Substance and Sexual Activity    Alcohol use: Never     Alcohol/week: 0.0 standard drinks of alcohol    Drug use: Never    Sexual activity: Yes     Partners: Male     Birth control/protection: Other-see comments, See Surgical Hx     Comment: Tubal       Medications  She has a current medication list which includes the following prescription(s): azelastine hcl, fluoxetine, montelukast, and tizanidine.      Allergies  Review of patient's allergies indicates:   Allergen Reactions    Celestone [betamethasone] Palpitations       All medications, allergies, and past history have been reviewed.    Objective:      Vitals:  Vitals - 1 value per visit 8/1/2023 8/1/2023 8/11/2023   SYSTOLIC - 116 115   DIASTOLIC - 70 69   Pulse - 88 86   Temp - 97.6 -   Resp - 18 -   SPO2 - 96 -   Weight (lb) - 120.59 123.68   Weight (kg) - 54.7 56.1   Height - 60 60   BMI (Calculated) - 23.6 24.2   VISIT REPORT  [id number]  [id number]; [id number]  [id number]; 17NONCRENCREPNotFromCR  [id number];   Pain Score  7 - -   Some recent data might be hidden       Body surface area is 1.54 meters squared.    Physical Exam:    GENERAL  APPEARANCE -  alert, appears stated age, and cooperative  BARRIER(S) TO COMMUNICATION -  none VOICE - appropriate for age and gender    INTEGUMENTARY  no suspicious head and neck lesions    HEENT  HEAD: Normocephalic, without obvious abnormality, atraumatic  FACE: INSPECTION - Symmetric, no signs of trauma, no suspicious lesion(s)  PALPATION -  No masses SALIVARY GLANDS - non-tender with no appreciable mass  STRENGTH - facial symmetry  NECK/THYROID: normal atraumatic, no neck masses, normal thyroid, no jvd    EYES  Normal occular alignment and mobility with no visible nystagmus at rest    EARS/NOSE/MOUTH/THROAT  EARS  PINNAE AND EXTERNAL EARS - no suspicious lesion OTOSCOPIC EXAM (surgical microscopy was not used for visualization/instrumentation): EAR EXAM - Normal ear canals, tympanic membranes and mobility, and middle ear spaces bilaterally.  HEARING - grossly intact to voice/finger rub    NOSE AND SINUSES  EXTERNAL NOSE - Grossly normal for age/sex  SEPTUM - normal/no obstruction on anterior exam without decongestion TURBINATES - within normal limits MUCOSA - within normal limits     MOUTH AND THROAT   ORAL CAVITY, LIPS, TEETH, GUMS & TONGUE - moist, no suspicious lesions  OROPHARYNX /TONSILS/PHARYNGEAL WALLS/HYPOPHARYNX - no erythema or exudates  NASOPHARYNX - limited mirror exam - unable to visualize due to anatomy/gag  LARYNX -  - limited mirror exam - unable to visualize due to anatomy/gag      CHEST AND LUNG   INSPECTION & AUSCULTATION - normal effort, no stridor    CARDIOVASCULAR  AUSCULTATION & PERIPHERAL VASCULAR - regular rate and rhythm.    NEUROLOGIC  MENTAL STATUS - alert, interactive CRANIAL NERVES - normal    LYMPHATIC  HEAD AND NECK - non-palpable          Procedure(s):  None          Labs:  WBC   Date Value Ref Range Status   07/25/2022 8.15 3.90 - 12.70 K/uL Final     Hemoglobin   Date Value Ref Range Status   07/25/2022 14.6 12.0 - 16.0 g/dL Final     Platelets   Date Value Ref Range Status   07/25/2022 249 150 - 450 K/uL Final     Creatinine   Date Value Ref Range Status   07/25/2022 0.8 0.5 - 1.4 mg/dL Final     TSH   Date Value Ref Range Status   01/20/2021 1.51 0.45 - 5.33 uIU/mL Final     Glucose   Date Value Ref Range Status   07/25/2022 92 70 - 110 mg/dL Final     Hemoglobin A1C   Date Value Ref Range Status   07/25/2022 4.7 4.0 - 5.6 % Final     Comment:     ADA Screening Guidelines:  5.7-6.4%  Consistent with prediabetes  >or=6.5%  Consistent with diabetes    High levels of fetal hemoglobin interfere with the HbA1C  assay. Heterozygous hemoglobin variants (HbS, HgC, etc)do  not significantly interfere with this assay.   However, presence of multiple variants may affect accuracy.           Assessment:      Problem List Items Addressed This Visit    None  Visit Diagnoses       Atypical face pain    -  Primary    Pain of maxillary sinus        Acute recurrent maxillary sinusitis        History of migraine                     Plan:         Etiology of right maxillary sinus pain is not clearly sinusitis and not specifically infectious sinusitis, but this is not excluded.  There is some history consistent with recurrent infectious sinusitis with recurrent green discharge.  There is also some non sinusitis driven potential causes including orthodontia and history of migraines.      As such given the patient just completed 7 of 10 days of the antibiotic and steroids we will proceed with CT scanning of the sinuses and high-resolution at this time.  Even if this is unchanged from before it maybe reasonable to proceed with right maxillary balloon dilation given the chronicity of the symptoms.  If however CT scanning shows more diffuse sinusitis more extensive  sinus surgery including ethmoidectomy etc. in the OR may be appropriate.      Patient to contact me through the [facility] web after the CT scan is completed to arrange next steps in evaluation and treatment which may include balloon dilation in the office.    Return with any worsening of symptoms, failure to improve, or any other concerns for further evaluation and treatment.      Voice recognition software was used in the creation of this note/communication and any sound-alike errors which may have occurred from its use should be taken in context when interpreting.  If such errors prevent a clear understanding of the note/communication, please contact the office for clarification.        TIME:  >30-minute total visit time including reviewing data/results and coordinating care. 50% of time spent face to face including counseling.

## 2023-08-18 ENCOUNTER — PATIENT MESSAGE (OUTPATIENT)
Dept: OTOLARYNGOLOGY | Facility: CLINIC | Age: 38
End: 2023-08-18
Payer: COMMERCIAL

## 2023-08-23 RX ORDER — METHYLPREDNISOLONE 4 MG/1
TABLET ORAL
Qty: 21 EACH | Refills: 0 | Status: SHIPPED | OUTPATIENT
Start: 2023-08-23 | End: 2023-09-15

## 2023-08-23 RX ORDER — AMOXICILLIN AND CLAVULANATE POTASSIUM 875; 125 MG/1; MG/1
1 TABLET, FILM COATED ORAL EVERY 12 HOURS
Qty: 20 TABLET | Refills: 0 | Status: SHIPPED | OUTPATIENT
Start: 2023-08-23 | End: 2023-09-02

## 2023-08-23 NOTE — TELEPHONE ENCOUNTER
Des Omer MD  to Me  Josefina Farooq RN    TS      8/22/23 10:03 AM  Can we get Relieva Tract approved as well for deviated septum to be ballooned concurrent with right maxillary balloon sinuplasty?  Thanks.  Please get approval and let Dimas with [employer] know as well.  We have in inflation devices but NOT the septal (Relieva Tract) balloons (they were back ordered).

## 2023-08-24 ENCOUNTER — PATIENT MESSAGE (OUTPATIENT)
Dept: PRIMARY CARE CLINIC | Facility: CLINIC | Age: 38
End: 2023-08-24
Payer: COMMERCIAL

## 2023-08-25 ENCOUNTER — PATIENT MESSAGE (OUTPATIENT)
Dept: OTOLARYNGOLOGY | Facility: CLINIC | Age: 38
End: 2023-08-25
Payer: COMMERCIAL

## 2023-08-28 ENCOUNTER — PATIENT MESSAGE (OUTPATIENT)
Dept: PRIMARY CARE CLINIC | Facility: CLINIC | Age: 38
End: 2023-08-28
Payer: COMMERCIAL

## 2023-08-28 RX ORDER — AZELASTINE 1 MG/ML
1 SPRAY, METERED NASAL 2 TIMES DAILY
Qty: 30 ML | Refills: 5 | Status: SHIPPED | OUTPATIENT
Start: 2023-08-28 | End: 2024-08-27

## 2023-08-30 ENCOUNTER — TELEPHONE (OUTPATIENT)
Dept: OTOLARYNGOLOGY | Facility: CLINIC | Age: 38
End: 2023-08-30
Payer: COMMERCIAL

## 2023-08-30 NOTE — TELEPHONE ENCOUNTER
Called pt and scheduled in-office balloon procedures for 9/15/23 at 320 pm. Advised pt that I have to message the Rep to make sure they are available for that day/time. We may have to adjust if they are not. Also, advised pt that I would check with Dr. Omer if any pre-medication is needed. If so, pt will need to come in prior to procedure date to sign consents. Pt verbalized understanding. Thanks, Lou

## 2023-08-30 NOTE — TELEPHONE ENCOUNTER
----- Message from Collette Patel sent at 8/30/2023  3:36 PM CDT -----  Regarding: Schedule for Surgery  Contact: pt @[phone]  Pt is calling to schedule her surgery for the Nasal Septum and Balloon Nasal Surgery pt states her insurance approved the procedure. Please c/b Thanks

## 2023-09-05 NOTE — TELEPHONE ENCOUNTER
Called pt back. Advised that I spoke with Dr. Omer and he will send in a prescription for Diazepam for pt to take prior to the procedure on 9/15/23. Advised pt that she will need to go to [facility] this Friday to sign consents for the procedure as we cannot have her sign it once she has taken the medication. Also advised pt that she will need someone to drive her to and from the procedure. Pt verbalized understanding. Thanks, Lou

## 2023-09-06 RX ORDER — DIAZEPAM 5 MG/1
TABLET ORAL
Qty: 2 TABLET | Refills: 0 | Status: SHIPPED | OUTPATIENT
Start: 2023-09-06 | End: 2023-12-28

## 2023-09-15 ENCOUNTER — OFFICE VISIT (OUTPATIENT)
Dept: OTOLARYNGOLOGY | Facility: CLINIC | Age: 38
End: 2023-09-15
Payer: COMMERCIAL

## 2023-09-15 VITALS
BODY MASS INDEX: 24.15 KG/M2 | HEIGHT: 60 IN | HEART RATE: 86 BPM | DIASTOLIC BLOOD PRESSURE: 74 MMHG | WEIGHT: 123 LBS | SYSTOLIC BLOOD PRESSURE: 116 MMHG

## 2023-09-15 DIAGNOSIS — J34.2 DEVIATED SEPTUM: ICD-10-CM

## 2023-09-15 DIAGNOSIS — J01.01 ACUTE RECURRENT MAXILLARY SINUSITIS: Primary | ICD-10-CM

## 2023-09-15 DIAGNOSIS — J34.3 NASAL TURBINATE HYPERTROPHY: ICD-10-CM

## 2023-09-15 DIAGNOSIS — J32.0 CHRONIC MAXILLARY SINUSITIS: ICD-10-CM

## 2023-09-15 PROCEDURE — 99499 NO LOS: ICD-10-PCS | Mod: S$GLB,,, | Performed by: OTOLARYNGOLOGY

## 2023-09-15 PROCEDURE — 99999 PR PBB SHADOW E&M-EST. PATIENT-LVL III: ICD-10-PCS | Mod: PBBFAC,,, | Performed by: OTOLARYNGOLOGY

## 2023-09-15 PROCEDURE — 31295 NSL/SINS NDSC SURG MAX SINS: CPT | Mod: 51,RT,S$GLB, | Performed by: OTOLARYNGOLOGY

## 2023-09-15 PROCEDURE — 31295 PR ENDOSCOPY, NASAL/SINUS, MAXILL SINUS OSTIUM, W/BALLOON DILAT: ICD-10-PCS | Mod: 51,RT,S$GLB, | Performed by: OTOLARYNGOLOGY

## 2023-09-15 PROCEDURE — 99999 PR PBB SHADOW E&M-EST. PATIENT-LVL III: CPT | Mod: PBBFAC,,, | Performed by: OTOLARYNGOLOGY

## 2023-09-15 PROCEDURE — 30520 PR REPAIR, NASAL SEPTUM: ICD-10-PCS | Mod: 52,S$GLB,, | Performed by: OTOLARYNGOLOGY

## 2023-09-15 PROCEDURE — 30520 REPAIR OF NASAL SEPTUM: CPT | Mod: 52,S$GLB,, | Performed by: OTOLARYNGOLOGY

## 2023-09-15 PROCEDURE — 99499 UNLISTED E&M SERVICE: CPT | Mod: S$GLB,,, | Performed by: OTOLARYNGOLOGY

## 2023-09-15 RX ORDER — CYCLOBENZAPRINE HCL 5 MG
5 TABLET ORAL
COMMUNITY
Start: 2023-09-11 | End: 2023-10-09

## 2023-09-15 NOTE — PATIENT INSTRUCTIONS
Use lots of nasal saline throughout the day and continue with NeilMed sinus rinses least twice a day.  Use the Afrin provided today (oxymetazoline) as outlined below and no longer.  Use nasal steroids as previously prescribed and as outlined.    Routine follow-up in 4 weeks.  Contact the office sooner with any concerns.      Voice recognition software was used in the creation of this note/communication and any sound-alike errors which may have occurred from its use should be taken in context when interpreting.  If such errors prevent a clear understanding of the note/communication, please contact the office for clarification.      NASAL SALINE    Still saline comes in many preparations including sprays/mists, gels, and rinses.  Different preparations served different purposes.  Saline spray helps to briefly moisturize the nose and help clear mucus.  Saline gels coat the nose for longer protective benefit of keeping the linings the nose moist.  Saline rinses clear the nose and sinuses and a more thorough way in her best used for significant postnasal drip and sinus complaints.  A combination of saline sprays/mists, gels and rinses should be used to address routine nasal clearing and dryness issues as well as flushing for better control of allergy and postnasal drip symptoms.  There is no real risk of over use of nasal saline products.  Saline sprays do not have any of the potential rebound or addiction of nasal decongestant sprays.  Nasal saline sprays and rinses should be used prior to the application of any medicated nasal sprays such as nasal steroids or nasal antihistamine sprays.      AFRIN/OXYMETAZOLINE NASAL DECONGESTANT REGIMEN    Afrin decongestant nasal spray 2 sprays towards the ear each side twice daily for 3 days, stop for 1 day, return for 3 more days then discontinue use entirely.      INTRANASAL STEROID SPRAYS      Intranasal steroid sprays are available both by prescription and over-the-counter  both in generic and brand name preparations.  They are all very similar in efficacy and side effect profiles.  Over-the-counter and prescription intranasal steroids include fluticasone propionate (Flonase), fluticasone furoate (Sensimist), triamcinolone (Nasacort), and budesonide (Rhinocort).  While these medications are available as prescriptions as well there are few nasal steroids in her available by prescription only and include mometasone (Nasonex), flunisolide (Nasarel), and beclomethasone (QNASL).    Nasal steroids or the foundation of treatment of both allergy and other inflammatory conditions of the nose and sinuses.  They are safe for regular use and while there are many side effects listed most of these are steroid class effects and not typically encountered.  Typical side effects include dryness and even ulceration and bleeding of the nose.  These side effects can be minimized by proper application and proper moisturization with saline and saline gel.    Sometimes changing between 1 brand of nasal steroid and another can result in improved control of symptoms especially after long term use of one particular nasal steroid.    Proper application of the nasal steroid spray is accomplished by spraying towards the I/ear on the same side with the tip of the superior just barely inside the nostril with the chin slightly downward.  Any dripping should be gently inhaled not sniff test backwards into the throat.  Labeled instructions should be followed.        SINUS RINSE INSTRUCTIONS    Nasal Saline Irrigation Instructions  You can wash your nasal and sinus passages using nasal saline (salt water) irrigation. This   is simple and effective. Follow the instructions below, as well as the ones provided by your   physician.  Supplies  First, you will need a nasal saline irrigation bottle and rinse solution.   You can purchase nasal rinse kits that include these items (such as   NeilMed®, Ayr®, Simply Saline®, Ocean  Complete®) at most drug   stores. You can also make your own saline irrigation solution by   adding kosher (non-iodine) salt and baking soda to distilled water.   Your physician may tell you to add medications like a steroid or   antibiotic to the rinse as needed.  Steps for nasal irrigation  Step 1. Fill the bottle  ? Wash your hands.  ? Fill the irrigation bottle with lukewarm distilled water or boiled water that has cooled.  Step 2. Mix the solution  ? Put the saline and salt packet contents into the bottle.  ? Tighten the top of the bottle and shake it gently to dissolve the mixture.  ? If you are making your own solution:   - Add 1/4 to 1/2 teaspoon of baking soda and 1/8 teaspoon of kosher (non-iodine) salt   into the bottle.   - Tighten the top of the bottle.   - Shake the bottle gently to dissolve the mixture.  Step 3. Get into position  ?  front of the sink.  ? Unless you were instructed to use another position, bend forward.   Then tilt your face down about 45 degrees so that you are looking   down into the sink.  ? Gently place the spout of the saline bottle against 1 of your nostrils.  [facility]  CARE AND TREATMENT  Patient Education  ©2018 [employer]  ©2018 [employer]  Step 4. Rinse  ? Breathing through your mouth, gently squeeze the   bottle. This will squirt the solution into your nostril. The   solution will start to drain from the other nostril. Some   may drain from your mouth. This is normal.  ? Use 2 ounces (half of the bottle) on each nostril.  ? Afterwards, you may need to blow your nose gently to   help drain any solution that is left behind.  Step 5. Repeat  ? Repeat steps 3 and 4 with the other nostril.  You can watch a video to learn how to do nasal saline irrigation. Go to youtube.com and   search for NeilMed Sinus Rinse.  Step 6.  Clean the bottle and cap. Air dry the Sinus Rinse bottle, cap, and tube on a clean paper  towel, a lint free towel, or use NeilMed® NasaDOCK® or NasaDOCK plus (sold separately) to store the bottle, cap and tube.  Please read Warnings before using.  Our recommendation is to replace the bottle every three months.      NEILMED CLEAING INSTRUCTIONS    It is very important to keep these devices clean and free from any contamination. Replace the bottle every 3 months.  NasaDock Plus  NasaDock NeilMed® SINUS RINSE Squeeze Bottle: Please perform routine inspections of the bottle and tube for any discolorations and cracks. If there are any visual signs of deterioration or permanent color changes, please clean thoroughly. If the discolorations remain after cleansing, discard the items and purchase new ones. Please follow these instructions after each use of the product. Be sure to replace your product after three months.  Step 1: Rinse the cap, tube and bottle using running water. Fill the bottle with distilled, micro-filtered (through 0.2 micron), reverse osmosis filtered, commercially bottled or previously boiled and cooled down water at lukewarm or body temperature..  Step 2: Add a few drops of dish washing liquid or baby shampoo.  Step 3: Attach the cap and tube to the bottle; hold your finger over the opening in the cap and shake the bottle vigorously.  Step 4: Squeeze the bottle hard to allow the soapy solution to clean the interior of the tube and the cap. Empty out the bottle completely.  Step 5: Rinse the soap from the bottle, cap and tube thoroughly and place the items on a clean paper towel to dry or use the preferred NasaDOCK® or NasaDOCK plus.    The NasaDOCK® is a simple, hygienic way to dry and store the SINUS RINSE bottle, cap and tube. NasaDOCK® comes with various hanging options and is available in different colors. Our newest model also offers storage for our SINUS RINSE mixture packets. We strongly suggest using NasaDOCK® as an inexpensive, easy way to dry the cap, tube and SINUS RINSE  bottle.        Cleaning:  Do not use a  to clean the inside of a bottle. While our bottle is  safe, a  will not adequately clean the SINUS RINSE bottle. The water jets in dishwashers cannot enter the narrow neck of the bottle, and portions of the bottles interior will not be cleaned thoroughly. Additional methods of cleaning the bottle include the use of concentrated white vinegar or isopropyl alcohol (70% concentration), followed by scrubbing and rinsing as described above.       Microwave Disinfection  Clean the device with soap and water as mentioned above and shake off the excess water. Now place the bottle, cap and tube in the microwave for 40 seconds. This will disinfect the bottle, cap and tube. If the microwave has been used recently, please make sure that the inside of the microwave has cooled back down to room temperature before using it to disinfect the bottle.    NeilMed NasaFlo® Neti Pot Users:  Use the same procedure as above.    Sinugator® Cleaning Directions:  Clean the Sinugator® by running plain water and dry with a clean lint free towel and then air dry the unit by keeping it open to the air. The nasal  tip, blue reservoir and white soft tube can be disinfected by cleaning with soap and water and shaking off the excess water before placing in the home microwave for 60 seconds. Clean the entire unit with a few drops of dishwashing liquid and water every three days to keep the unit clean. As a fi nal rinse to wash off any residual soap or tap water, use either distilled, micro-filtered (through 0.2 micron filter), commercially bottled or previously boiled & cooled down water. Please make sure to rinse thoroughly during each wash so no soap is left behind. DO NOT place the white motor unit in microwave for disinfection. Because of the units stainless steel components, this can cause damage or fire hazards.    General Principles of Maintenance &  Storage:  When permissible use a microwave periodically to disinfect devices. Always store NeilMed® products in a cool and dry place with adequate ventilation. NasaDOCK® or NasaDOCK plus offer a simple hygienic way to air dry & neatly store the bottle, cap, tube and NasaFlo. Do not store the bottle with the cap screwed on, unless both are dry. Do not store the wet parts in a sealed plastic bag. If you travel before they are dry, wrap parts separately in paper towels. Hand soap or shampoo can be used for cleaning parts while away from home.        USE ONLY AS DIRECTED, IF SYMPTOMS PERSIST SEE YOUR DOCTOR/HEALTHCARE PROFESSIONAL. ALWAYS READ THE LABEL.

## 2023-09-15 NOTE — PROGRESS NOTES
[facility]    Subjective:      Patient: Shena Gomez Patient PCP: Dixon Anderson MD         :  1985     Sex:  female      MRN:  [id number]          Date of Visit: 09/15/2023      Chief Complaint: No chief complaint on file.      Patient ID: Shena Gomez is a 38 y.o. female lifelong NON-smoker with recurrent right-sided maxillary sinusitis with some degree of chronic right maxillary sinus pressure with CT findings consistent with same as well as deviated septum to the same side with associated partial nasal obstruction here today for balloon dilation sinuplasty of the right maxillary sinus and balloon septoplasty.  Symptoms only transiently improve with systemic therapy and are not maintained with topical saline, nasal antihistamines and nasal steroids.  Previous CT scanning review with findings as above.  Previously consented for the procedure.  Presents today with a  having taken pre procedural diazepam as prescribed.    2023 CT sinuses        Review of Systems     Past Medical History  She has a past medical history of Anxiety, Chronic sinusitis, Epigastric pain, Kidney stones, Nephrolithiasis, and OAB (overactive bladder).    Family / Surgical / Social History  Her family history includes Breast cancer (age of onset: 56) in her maternal aunt; Breast cancer (age of onset: 68) in her maternal grandmother; Esophageal cancer in her maternal aunt.    Past Surgical History:   Procedure Laterality Date    Benign neoplasm buttock skin Right 10/05/2022    CERVICAL CERCLAGE  2013     SECTION      CYSTOSCOPY N/A 2019    Procedure: CYSTOSCOPY;  Surgeon: Yokasta John MD;  Location: [facility] OR;  Service: OB/GYN;  Laterality: N/A;    ESOPHAGOGASTRODUODENOSCOPY N/A 2019    Procedure: EGD (ESOPHAGOGASTRODUODENOSCOPY);  Surgeon: Ferdinand Cooney MD;  Location: [facility];  Service: General;  Laterality: N/A;    HYSTERECTOMY      LAPAROSCOPIC SALPINGECTOMY  Bilateral 08/27/2019    Procedure: SALPINGECTOMY, LAPAROSCOPIC;  Surgeon: Yokasta John MD;  Location: [facility], [place], operated by [facility] OR;  Service: OB/GYN;  Laterality: Bilateral;    LAPAROSCOPIC TOTAL HYSTERECTOMY N/A 08/27/2019    Procedure: HYSTERECTOMY, TOTAL, LAPAROSCOPIC;  Surgeon: Yokasta John MD;  Location: [facility], [place] operated by [facility] OR;  Service: OB/GYN;  Laterality: N/A;    SOFT TISSUE BIOPSY Right 10/5/2022    Procedure: BIOPSY, SOFT TISSUE, right medial thigh/gluteal area;  Surgeon: Ferdinand Cooney MD;  Location: [facility] OR;  Service: General;  Laterality: Right;    TUBAL LIGATION  02/24/2014       Social History     Tobacco Use    Smoking status: Never    Smokeless tobacco: Never   Substance and Sexual Activity    Alcohol use: Never     Alcohol/week: 0.0 standard drinks of alcohol    Drug use: Never    Sexual activity: Yes     Partners: Male     Birth control/protection: Other-see comments, See Surgical Hx     Comment: Tubal       Medications  She has a current medication list which includes the following prescription(s): azelastine, azelastine hcl, diazepam, fluoxetine, methylprednisolone, montelukast, and tizanidine.      Allergies  Review of patient's allergies indicates:   Allergen Reactions    Celestone [betamethasone] Palpitations       All medications, allergies, and past history have been reviewed.    Objective:      Vitals:      7/12/2023    10:11 AM 8/1/2023     1:40 PM 8/11/2023     2:34 PM   Vitals - 1 value per visit   SYSTOLIC  116 115   DIASTOLIC  70 69   Pulse  88 86   Temp  97.6 °F (36.4 °C)    Resp  18    SPO2  96 %    Weight (lb) 118.83 120.59 123.68   Weight (kg) 53.9 54.7 56.1   Height 5' (1.524 m) 5' (1.524 m) 5' (1.524 m)   BMI (Calculated) 23.2 23.6 24.2   Pain Score Zero Seven        There is no height or weight on file to calculate BSA.    Physical Exam:    GENERAL  APPEARANCE -  alert, appears stated age, and cooperative  BARRIER(S) TO COMMUNICATION -  none VOICE - appropriate for age and  gender    INTEGUMENTARY  no suspicious head and neck lesions    HEENT  HEAD: Normocephalic, without obvious abnormality, atraumatic  FACE: INSPECTION - Symmetric, no signs of trauma, no suspicious lesion(s)      EYES  Normal occular alignment and mobility with no visible nystagmus at rest    EARS/NOSE/MOUTH/THROAT  EARS  PINNAE AND EXTERNAL EARS - no suspicious lesion HEARING - grossly intact to voice/finger rub    NOSE AND SINUSES  EXTERNAL NOSE - Grossly normal for age/sex  SEPTUM - bowing left major posterior spurring right TURBINATES - bilateral hypertrophy 3+ MUCOSA - mild congestion, no pus or polyps     MOUTH AND THROAT   ORAL CAVITY, LIPS, TEETH, GUMS & TONGUE - moist, no suspicious lesions    CHEST AND LUNG   INSPECTION & AUSCULTATION - normal effort, no stridor    CARDIOVASCULAR  AUSCULTATION & PERIPHERAL VASCULAR - regular rate and rhythm.    NEUROLOGIC  MENTAL STATUS - alert, interactive CRANIAL NERVES - normal    LYMPHATIC  HEAD AND NECK - non-palpable      Procedure(s):  Balloon sinuplasty right maxillary and balloon septoplasty.  See procedure note.    Labs:  WBC   Date Value Ref Range Status   07/25/2022 8.15 3.90 - 12.70 K/uL Final     Hemoglobin   Date Value Ref Range Status   07/25/2022 14.6 12.0 - 16.0 g/dL Final     Platelets   Date Value Ref Range Status   07/25/2022 249 150 - 450 K/uL Final     Creatinine   Date Value Ref Range Status   07/25/2022 0.8 0.5 - 1.4 mg/dL Final     TSH   Date Value Ref Range Status   01/20/2021 1.51 0.45 - 5.33 uIU/mL Final     Glucose   Date Value Ref Range Status   07/25/2022 92 70 - 110 mg/dL Final     Hemoglobin A1C   Date Value Ref Range Status   07/25/2022 4.7 4.0 - 5.6 % Final     Comment:     ADA Screening Guidelines:  5.7-6.4%  Consistent with prediabetes  >or=6.5%  Consistent with diabetes    High levels of fetal hemoglobin interfere with the HbA1C  assay. Heterozygous hemoglobin variants (HbS, HgC, etc)do  not significantly interfere with this assay.    However, presence of multiple variants may affect accuracy.           Assessment:      Problem List Items Addressed This Visit    None  Visit Diagnoses       Acute recurrent maxillary sinusitis    -  Primary    Chronic maxillary sinusitis        Deviated septum        Nasal turbinate hypertrophy                     Plan:      Use lots of nasal saline throughout the day and continue with NeilMed sinus rinses least twice a day.  Use the Afrin provided today (oxymetazoline) as outlined below and no longer.  Use nasal steroids as previously prescribed and as outlined.    Routine follow-up in 4 weeks.  Contact the office sooner with any concerns.

## 2023-09-15 NOTE — PROCEDURES
PROCEDURE NOTE      PRE-OP DIAGNOSIS:     Acute recurrent maxillary sinusitis    Chronic maxillary sinusitis    Deviated septum    Nasal turbinate hypertrophy      POST-OP DIAGNOSIS: same    PROCEDURE(S):     (1) Balloon dilation sinuplasty right maxillary  (2) Balloon dilation septoplasty    SURGEON: Des Omer    ASSISTANT: N/A    ANESTHESIA: Topical 4% lidocaine and oxymetazoline, submucosal 1% lidocaine with epi 1:200,000 approx 10 ml    PATIENT CONDITION: Good    ESTIMATED BLOOD LOSS: minimall    SPECIMEN(S): None    COMPLICATIONS: none    IMPLANTS(S)/DRAIN(S): n/a    FINDINGS:     EXTERNAL NOSE - Grossly normal for age/sex  SEPTUM - bowing left major posterior spurring right TURBINATES - bilateral hypertrophy 3+ MUCOSA - mild congestion, no pus or polyps     PROCEDURE:     Informed consent previously obtained prior to administering oral sedation with diazepam 10 mg.  Patient placed in procedure chair slightly reclined and upright for different portions of the case.  Nasal mucosa decongested with oxymetazoline by aerosolized cessation and then 4% lidocaine similarly.  Cotton soaked in 4% lidocaine was then placed in each nares and allowed to sit for 10 minutes.  The septum inferior turbinates and floor of the nose were infiltrated with 1% lidocaine with epinephrine [id number] bilaterally with the additional injected on the left side during the procedure due to intolerance of the left septal balloon dilation.  A total of 10 mL or less was used.    Additionally the area of the sphenopalatine artery was infiltrated bilaterally with 1% lidocaine with epinephrine on a 1 cc syringe with a 27 gauge 1-1/2 inch needle.      The area the lacrimal bone and uncinate process as well as the middle turbinate were infiltrated with 1% lidocaine with epinephrine on the right side only.    Cotton soaked in 4% lidocaine was replaced for an additional 10 minutes.      Beginning with the right maxillary sinus using the  Acclarent balloon dilation system the maxillary introducer and a 0 degree 3 mm endoscope were used to put the introducer behind the uncinate process directed anterolaterally and the guidewire was readily passed into the sinus with a good spot seen through the cheek which was focal not diffuse.  The balloon was passed into the sinus and dilated to 12 atmospheres partially removed into the middle meatus and again overlapping the dilated to 12 atmospheres.  Instrumentation was removed 1st guidewire then the balloon and the introducer.  There was good anterior displacement of the inferior portion of the uncinate process.  No gross purulence.  Minimal blood.      At this point the Track septal balloon was placed along the floor of the nose on the right side under the posterior spur in inflated and kept in place for 2 minutes.  The spur was nearly eliminated with only a small superior remnant.  The septum which was now displaced of the left was dilated on the left again for 2 minutes (needing some additional injection of the septum in the floor of the nose).  The septum was then manipulated into the midline with a combination of a Goldberg displacer and the straight suction.  There was minimal bleeding.  A nasal drip pad dressing was applied after spraying both nostrils with the additional oxymetazoline.      Patient tolerated the procedure well with no apparent complications and only minimal postoperative bleeding.  Postoperative aftercare reviewed and provided in written form to the patient's portal.  All questions answered.

## 2023-09-18 ENCOUNTER — PATIENT MESSAGE (OUTPATIENT)
Dept: PRIMARY CARE CLINIC | Facility: CLINIC | Age: 38
End: 2023-09-18
Payer: COMMERCIAL

## 2023-09-18 RX ORDER — FLUOXETINE 10 MG/1
10 CAPSULE ORAL
Qty: 90 CAPSULE | Refills: 0 | Status: SHIPPED | OUTPATIENT
Start: 2023-09-18 | End: 2023-12-21

## 2023-09-19 ENCOUNTER — PATIENT MESSAGE (OUTPATIENT)
Dept: OTOLARYNGOLOGY | Facility: CLINIC | Age: 38
End: 2023-09-19
Payer: COMMERCIAL

## 2023-09-19 ENCOUNTER — OFFICE VISIT (OUTPATIENT)
Dept: NEUROLOGY | Facility: CLINIC | Age: 38
End: 2023-09-19
Payer: COMMERCIAL

## 2023-09-19 DIAGNOSIS — G44.86 CERVICOGENIC HEADACHE: Primary | ICD-10-CM

## 2023-09-19 DIAGNOSIS — J30.2 PERENNIAL ALLERGIC RHINITIS WITH SEASONAL VARIATION: Primary | ICD-10-CM

## 2023-09-19 DIAGNOSIS — J30.89 PERENNIAL ALLERGIC RHINITIS WITH SEASONAL VARIATION: Primary | ICD-10-CM

## 2023-09-19 PROBLEM — G43.009 MIGRAINE WITHOUT AURA AND WITHOUT STATUS MIGRAINOSUS, NOT INTRACTABLE: Status: ACTIVE | Noted: 2023-09-19

## 2023-09-19 PROCEDURE — 99213 OFFICE O/P EST LOW 20 MIN: CPT | Mod: S$GLB,,, | Performed by: STUDENT IN AN ORGANIZED HEALTH CARE EDUCATION/TRAINING PROGRAM

## 2023-09-19 PROCEDURE — 99999 PR PBB SHADOW E&M-EST. PATIENT-LVL II: CPT | Mod: PBBFAC,,, | Performed by: STUDENT IN AN ORGANIZED HEALTH CARE EDUCATION/TRAINING PROGRAM

## 2023-09-19 PROCEDURE — 99999 PR PBB SHADOW E&M-EST. PATIENT-LVL II: ICD-10-PCS | Mod: PBBFAC,,, | Performed by: STUDENT IN AN ORGANIZED HEALTH CARE EDUCATION/TRAINING PROGRAM

## 2023-09-19 PROCEDURE — 99213 PR OFFICE/OUTPT VISIT, EST, LEVL III, 20-29 MIN: ICD-10-PCS | Mod: S$GLB,,, | Performed by: STUDENT IN AN ORGANIZED HEALTH CARE EDUCATION/TRAINING PROGRAM

## 2023-09-19 RX ORDER — GABAPENTIN 300 MG/1
300 CAPSULE ORAL 2 TIMES DAILY
Qty: 60 CAPSULE | Refills: 5 | Status: SHIPPED | OUTPATIENT
Start: 2023-09-19 | End: 2024-09-18

## 2023-09-19 NOTE — PROGRESS NOTES
Neurology Clinic Note      Date: 9/19/23  Patient Name: Shena Gomez   MRN: [id number]   PCP: Dixon Anderson  Referring Provider: No ref. provider found    Assessment and Plan:   Shena Gomez is a 38 y.o. female with a history of headaches who presents for follow-up.  The description seems less consistent with migraines and more in line with cervicogenic headaches.  Recommend MRI of the cervical spine. Referral placed to PT    Trial of Gabapentin; Start at 300mg nightly x1 week and titrate as tolerated -> 300mg BID x 1 week --> 300mg TID    RTC 5 months.        Problem List Items Addressed This Visit          Neuro    Cervicogenic headache - Primary         Subjective:       Interval History (9/19/2023):    X-ray of the cervical spine was normal. Xanaflex hasn't helped much.  Continues to have headaches, more left-sided, although the frequency of the attacks has decreased to 4-5/month.  Last month, she had 1 isolated episode of holocephalic headache that resolved with Excedrin.  Denies any nausea/vomiting, photophobia or phonophobia.    HPI (7/12/2023):   Ms. Shena Gomez is a 37 y.o. female with a history of nephrolithiasis presenting for evaluation of headaches.     Started having headaches around 2 years ago.  Predominantly left-sided, radiating from the occipital region to the temporal and frontal region and associated with ear pain.  Over the last 2 weeks, her right side has also gotten involved, but mostly confined to the occipital region.  No clear triggers.  Tends to occur towards the evening and resolves by the next morning.  Frequency is around 10-15/month.  Nausea and photophobia.  Denies any phonophobia.  Was initially diagnosed with migraines and prescribed Imitrex which did not help.  She was then told that she had a pinched nerve in her neck and prescribed Flexeril which has helped although she was concerned about drowsiness.     Family history of migraines in her sister.     Her other  medications include fluoxetine which she takes for anxiety.    PAST MEDICAL HISTORY:  Past Medical History:   Diagnosis Date    Anxiety     Chronic sinusitis     Epigastric pain     Kidney stones     history    Nephrolithiasis     OAB (overactive bladder)        PAST SURGICAL HISTORY:  Past Surgical History:   Procedure Laterality Date    Benign neoplasm buttock skin Right 10/05/2022    CERVICAL CERCLAGE  2013     SECTION      CYSTOSCOPY N/A 2019    Procedure: CYSTOSCOPY;  Surgeon: Yokasta John MD;  Location: [facility] OR;  Service: OB/GYN;  Laterality: N/A;    ESOPHAGOGASTRODUODENOSCOPY N/A 2019    Procedure: EGD (ESOPHAGOGASTRODUODENOSCOPY);  Surgeon: Ferdinand Cooney MD;  Location: [facility];  Service: General;  Laterality: N/A;    HYSTERECTOMY      LAPAROSCOPIC SALPINGECTOMY Bilateral 2019    Procedure: SALPINGECTOMY, LAPAROSCOPIC;  Surgeon: Yokasta John MD;  Location: [facility] OR;  Service: OB/GYN;  Laterality: Bilateral;    LAPAROSCOPIC TOTAL HYSTERECTOMY N/A 2019    Procedure: HYSTERECTOMY, TOTAL, LAPAROSCOPIC;  Surgeon: Yokasta John MD;  Location: [facility];  Service: OB/GYN;  Laterality: N/A;    SOFT TISSUE BIOPSY Right 10/5/2022    Procedure: BIOPSY, SOFT TISSUE, right medial thigh/gluteal area;  Surgeon: Ferdinand Cooney MD;  Location: [facility];  Service: General;  Laterality: Right;    TUBAL LIGATION  2014       CURRENT MEDS:  Current Outpatient Medications   Medication Sig Dispense Refill    azelastine (ASTELIN) 137 mcg (0.1 %) nasal spray 1 spray (137 mcg total) by Nasal route 2 (two) times daily. 30 mL 5    azelastine HCl (ASTEPRO ALLERGY NASL) by Nasal route.      cyclobenzaprine (FLEXERIL) 5 MG tablet Take 5 mg by mouth.      diazePAM (VALIUM) 5 MG tablet Take 5 mg by mouth 1-2 hours prior to procedure, repeat 30 minutes prior to procedure if not effective. 2 tablet 0    FLUoxetine 10 MG capsule TAKE 1 CAPSULE BY MOUTH EVERY  DAY 90 capsule 0    montelukast (SINGULAIR) 10 mg tablet Take 1 tablet (10 mg total) by mouth every evening. 30 tablet 2     No current facility-administered medications for this visit.       ALLERGIES:  Review of patient's allergies indicates:   Allergen Reactions    Celestone [betamethasone] Palpitations       FAMILY HISTORY:  Family History   Problem Relation Age of Onset    Breast cancer Maternal Grandmother 68    Breast cancer Maternal Aunt 56    Esophageal cancer Maternal Aunt     Cancer Neg Hx     Colon cancer Neg Hx     Diabetes Neg Hx     Eclampsia Neg Hx     Miscarriages / Stillbirths Neg Hx     Hypertension Neg Hx     Ovarian cancer Neg Hx      labor Neg Hx     Stroke Neg Hx        SOCIAL HISTORY:  Social History     Tobacco Use    Smoking status: Never    Smokeless tobacco: Never   Substance Use Topics    Alcohol use: Never     Alcohol/week: 0.0 standard drinks of alcohol    Drug use: Never       Review of Systems:  12 system review of systems is negative except for the symptoms mentioned in HPI.      Objective:   There were no vitals filed for this visit.  General: NAD, well nourished   Eyes: no tearing, discharge, no erythema   Neck: Supple, full range of motion  Cardiovascular: Warm and well perfused  Lungs: Normal work of breathing  Skin: No rash, lesions, or breakdown on exposed skin  Psychiatry: Mood and affect are appropriate     NEUROLOGICAL EXAMINATION:      MENTAL STATUS   Oriented to person, place, and time.   Follows 2 step commands.   Speech: speech is normal   Level of consciousness: alert     CRANIAL NERVES      CN II   Visual fields full to confrontation.      CN III, IV, VI   Extraocular motions are normal.   Nystagmus: none   Ophthalmoparesis: none     CN V   Facial sensation intact.      CN VII   Facial expression full, symmetric.      CN XI   CN XI normal.      CN XII   CN XII normal.      MOTOR EXAM   Right arm pronator drift: absent  Left arm pronator drift: absent      Strength   Right deltoid: 5/5  Left deltoid: 5/5  Right biceps: 5/5  Left biceps: 5/5  Right triceps: 5/5  Left triceps: 5/5  Right wrist flexion: 5/5  Left wrist flexion: 5/5  Right wrist extension: 5/5  Left wrist extension: 5/5  Right interossei: 5/5  Left interossei: 5/5  Right iliopsoas: 5/5  Left iliopsoas: 5/5  Right quadriceps: 5/5  Left quadriceps: 5/5  Right hamstrin/5  Left hamstrin/5  Right glutei: 5/5  Left glutei: 5/5  Right anterior tibial: 5/5  Left anterior tibial: 5/5  Right gastroc: 5/5  Left gastroc: 5/5     REFLEXES      Reflexes   Right brachioradialis: 2+  Left brachioradialis: 2+  Right biceps: 2+  Left biceps: 2+  Right patellar: 2+  Left patellar: 2+  Right plantar: normal  Left plantar: normal     SENSORY EXAM   Light touch normal.      GAIT AND COORDINATION      Gait  Gait: normal     Coordination   Finger to nose coordination: normal     Tremor   Intention tremor: absent           Pablo Caldwell MD  Department of Neurology  [facility]

## 2023-10-08 DIAGNOSIS — G43.009 MIGRAINE WITHOUT AURA AND WITHOUT STATUS MIGRAINOSUS, NOT INTRACTABLE: ICD-10-CM

## 2023-10-08 DIAGNOSIS — G44.86 CERVICOGENIC HEADACHE: ICD-10-CM

## 2023-10-09 RX ORDER — TIZANIDINE 2 MG/1
TABLET ORAL
Qty: 180 TABLET | Refills: 1 | Status: SHIPPED | OUTPATIENT
Start: 2023-10-09

## 2023-10-18 ENCOUNTER — PATIENT MESSAGE (OUTPATIENT)
Dept: CARDIOLOGY | Facility: CLINIC | Age: 38
End: 2023-10-18
Payer: COMMERCIAL

## 2023-10-25 ENCOUNTER — PATIENT MESSAGE (OUTPATIENT)
Dept: NEUROLOGY | Facility: CLINIC | Age: 38
End: 2023-10-25
Payer: COMMERCIAL

## 2023-11-03 ENCOUNTER — PATIENT MESSAGE (OUTPATIENT)
Dept: OTOLARYNGOLOGY | Facility: CLINIC | Age: 38
End: 2023-11-03
Payer: COMMERCIAL

## 2023-11-06 ENCOUNTER — PATIENT MESSAGE (OUTPATIENT)
Dept: NEUROLOGY | Facility: CLINIC | Age: 38
End: 2023-11-06
Payer: COMMERCIAL

## 2023-11-16 DIAGNOSIS — G44.86 CERVICOGENIC HEADACHE: Primary | ICD-10-CM

## 2023-12-11 ENCOUNTER — PATIENT MESSAGE (OUTPATIENT)
Dept: NEUROLOGY | Facility: CLINIC | Age: 38
End: 2023-12-11
Payer: COMMERCIAL

## 2023-12-11 NOTE — TELEPHONE ENCOUNTER
Spoke with patient informing her see can contact [facility] location let that facility know she has a referral order placed to been seen for a headache specialist

## 2023-12-20 ENCOUNTER — OFFICE VISIT (OUTPATIENT)
Dept: GASTROENTEROLOGY | Facility: CLINIC | Age: 38
End: 2023-12-20
Payer: COMMERCIAL

## 2023-12-20 VITALS
OXYGEN SATURATION: 96 % | RESPIRATION RATE: 18 BRPM | DIASTOLIC BLOOD PRESSURE: 75 MMHG | HEART RATE: 101 BPM | WEIGHT: 122.13 LBS | BODY MASS INDEX: 23.98 KG/M2 | HEIGHT: 60 IN | SYSTOLIC BLOOD PRESSURE: 109 MMHG

## 2023-12-20 DIAGNOSIS — R19.5 CHANGE IN STOOL CALIBER: Primary | ICD-10-CM

## 2023-12-20 DIAGNOSIS — R19.5 MUCUS IN STOOL: ICD-10-CM

## 2023-12-20 PROCEDURE — 3074F SYST BP LT 130 MM HG: CPT | Mod: CPTII,S$GLB,, | Performed by: INTERNAL MEDICINE

## 2023-12-20 PROCEDURE — 99204 OFFICE O/P NEW MOD 45 MIN: CPT | Mod: S$GLB,,, | Performed by: INTERNAL MEDICINE

## 2023-12-20 PROCEDURE — 3008F PR BODY MASS INDEX (BMI) DOCUMENTED: ICD-10-PCS | Mod: CPTII,S$GLB,, | Performed by: INTERNAL MEDICINE

## 2023-12-20 PROCEDURE — 99999 PR PBB SHADOW E&M-EST. PATIENT-LVL IV: CPT | Mod: PBBFAC,,, | Performed by: INTERNAL MEDICINE

## 2023-12-20 PROCEDURE — 3078F DIAST BP <80 MM HG: CPT | Mod: CPTII,S$GLB,, | Performed by: INTERNAL MEDICINE

## 2023-12-20 PROCEDURE — 3078F PR MOST RECENT DIASTOLIC BLOOD PRESSURE < 80 MM HG: ICD-10-PCS | Mod: CPTII,S$GLB,, | Performed by: INTERNAL MEDICINE

## 2023-12-20 PROCEDURE — 1159F PR MEDICATION LIST DOCUMENTED IN MEDICAL RECORD: ICD-10-PCS | Mod: CPTII,S$GLB,, | Performed by: INTERNAL MEDICINE

## 2023-12-20 PROCEDURE — 3074F PR MOST RECENT SYSTOLIC BLOOD PRESSURE < 130 MM HG: ICD-10-PCS | Mod: CPTII,S$GLB,, | Performed by: INTERNAL MEDICINE

## 2023-12-20 PROCEDURE — 3008F BODY MASS INDEX DOCD: CPT | Mod: CPTII,S$GLB,, | Performed by: INTERNAL MEDICINE

## 2023-12-20 PROCEDURE — 99999 PR PBB SHADOW E&M-EST. PATIENT-LVL IV: ICD-10-PCS | Mod: PBBFAC,,, | Performed by: INTERNAL MEDICINE

## 2023-12-20 PROCEDURE — 1159F MED LIST DOCD IN RCRD: CPT | Mod: CPTII,S$GLB,, | Performed by: INTERNAL MEDICINE

## 2023-12-20 PROCEDURE — 99204 PR OFFICE/OUTPT VISIT, NEW, LEVL IV, 45-59 MIN: ICD-10-PCS | Mod: S$GLB,,, | Performed by: INTERNAL MEDICINE

## 2023-12-20 RX ORDER — SODIUM, POTASSIUM,MAG SULFATES 17.5-3.13G
1 SOLUTION, RECONSTITUTED, ORAL ORAL DAILY
Qty: 1 KIT | Refills: 0 | Status: SHIPPED | OUTPATIENT
Start: 2023-12-20 | End: 2023-12-22

## 2023-12-20 NOTE — PROGRESS NOTES
GASTROENTEROLOGY CLINIC NOTE    Reason for visit: The primary encounter diagnosis was Change in stool caliber. A diagnosis of Mucus in stool was also pertinent to this visit.  Referring provider/PCP: Dixon Anderson MD    HPI:  Shena Gomez is a 38 y.o. female here today for changes in stools. New patient.    Ongoing symptoms, mucus in stool, 1 week or so, never happened before. Occurring a few times, but perhaps seems to be resolving. Perhaps thinning of stool, the stool is more thin in caliber, No blood in stool. Having BM every day mainly. No vomiting. No radiating sypmtoms.  Isnt sure if she is constipated.     Hx hysterectomy for fibroids.     Prior Endoscopy:  EGD: no  Colon: no    (Portions of this note were dictated using voice recognition software and may contain dictation related errors in spelling/grammar/syntax not found on text review)    Review of Systems   Constitutional:  Negative for fever and malaise/fatigue.   Respiratory:  Negative for cough and shortness of breath.    Cardiovascular:  Negative for chest pain and palpitations.   Gastrointestinal:  Negative for abdominal pain, blood in stool, nausea and vomiting.   Neurological:  Negative for dizziness and headaches.       Past Medical History: has a past medical history of Anxiety, Chronic sinusitis, Epigastric pain, Kidney stones, Nephrolithiasis, and OAB (overactive bladder).    Past Surgical History: has a past surgical history that includes  section; Tubal ligation (2014); Laparoscopic total hysterectomy (N/A, 2019); Cystoscopy (N/A, 2019); Laparoscopic salpingectomy (Bilateral, 2019); Cervical cerclage (2013); Esophagogastroduodenoscopy (N/A, 2019); Hysterectomy; Benign neoplasm buttock skin (Right, 10/05/2022); and Soft Tissue Biopsy (Right, 10/5/2022).    Family History:family history includes Breast cancer (age of onset: 56) in her maternal aunt; Breast cancer (age of onset: 68) in her  maternal grandmother; Esophageal cancer in her maternal aunt.    Allergies:   Review of patient's allergies indicates:   Allergen Reactions    Celestone [betamethasone] Palpitations       Social History: reports that she has never smoked. She has never used smokeless tobacco. She reports that she does not drink alcohol and does not use drugs.    Home medications:   Current Outpatient Medications on File Prior to Visit   Medication Sig Dispense Refill    diazePAM (VALIUM) 5 MG tablet Take 5 mg by mouth 1-2 hours prior to procedure, repeat 30 minutes prior to procedure if not effective. 2 tablet 0    FLUoxetine 10 MG capsule TAKE 1 CAPSULE BY MOUTH EVERY DAY 90 capsule 0    tiZANidine (ZANAFLEX) 2 MG tablet TAKE 1 TABLET (2 MG TOTAL) BY MOUTH EVERY 12 HOURS 180 tablet 1    azelastine (ASTELIN) 137 mcg (0.1 %) nasal spray 1 spray (137 mcg total) by Nasal route 2 (two) times daily. (Patient not taking: Reported on 12/20/2023) 30 mL 5    azelastine HCl (ASTEPRO ALLERGY NASL) by Nasal route.      gabapentin (NEURONTIN) 300 MG capsule Take 1 capsule (300 mg total) by mouth 2 (two) times daily. (Patient not taking: Reported on 12/20/2023) 60 capsule 5    montelukast (SINGULAIR) 10 mg tablet Take 1 tablet (10 mg total) by mouth every evening. (Patient not taking: Reported on 12/20/2023) 30 tablet 2     No current facility-administered medications on file prior to visit.       Vital signs:  /75 (BP Location: Right arm, Patient Position: Sitting, BP Method: Medium (Automatic))   Pulse 101   Resp 18   Ht 5' (1.524 m)   Wt 55.4 kg (122 lb 2.2 oz)   LMP 08/26/2019   SpO2 96%   BMI 23.85 kg/m²     Physical Exam  Vitals reviewed.   Constitutional:       General: She is not in acute distress.  HENT:      Head: Normocephalic and atraumatic.   Eyes:      General: No scleral icterus.     Conjunctiva/sclera: Conjunctivae normal.   Skin:     General: Skin is warm.      Coloration: Skin is not pale.      Findings: No rash.    Neurological:      Mental Status: She is oriented to person, place, and time.      Gait: Gait normal.   Psychiatric:         Mood and Affect: Mood normal.         Behavior: Behavior normal.         I have reviewed prior labs, imaging, notes from last month    Assessment:  1. Change in stool caliber    2. Mucus in stool      Thin caliber stool and mucus changes  Most likely constipation driven but will obtain full evaulation.    Plan:  Orders Placed This Encounter    Clostridium difficile EIA    Stool culture    X-Ray Abdomen AP 1 View    Giardia / Cryptosporidum, EIA    H. pylori antigen, stool    Calprotectin, Stool    Stool Exam-Ova,Cysts,Parasites    WBC, Stool    Pancreatic elastase, fecal    Fecal fat, qualitative    sodium,potassium,mag sulfates (SUPREP BOWEL PREP KIT) 17.5-3.13-1.6 gram SolR    Case Request Endoscopy: COLONOSCOPY     Plan colonoscopy    Stool studies    XRay rule out constiaption    Advised to add metamucil daily    RTC based on above.    Jefry Singh MD  [facility] [place]

## 2023-12-21 RX ORDER — FLUOXETINE 10 MG/1
10 CAPSULE ORAL
Qty: 90 CAPSULE | Refills: 3 | Status: SHIPPED | OUTPATIENT
Start: 2023-12-21

## 2023-12-21 NOTE — TELEPHONE ENCOUNTER
No care due was identified.  [facility] Embedded Care Due Messages. Reference number: [id number].   12/21/2023 12:20:00 AM CST

## 2023-12-27 ENCOUNTER — PATIENT MESSAGE (OUTPATIENT)
Dept: GASTROENTEROLOGY | Facility: CLINIC | Age: 38
End: 2023-12-27
Payer: COMMERCIAL

## 2024-01-08 ENCOUNTER — PATIENT MESSAGE (OUTPATIENT)
Dept: NEUROLOGY | Facility: CLINIC | Age: 39
End: 2024-01-08
Payer: COMMERCIAL

## 2024-01-09 DIAGNOSIS — R20.2 LEFT HAND PARESTHESIA: Primary | ICD-10-CM

## 2024-02-08 ENCOUNTER — OFFICE VISIT (OUTPATIENT)
Dept: PAIN MEDICINE | Facility: CLINIC | Age: 39
End: 2024-02-08
Payer: COMMERCIAL

## 2024-02-08 VITALS
HEART RATE: 93 BPM | WEIGHT: 123.69 LBS | SYSTOLIC BLOOD PRESSURE: 109 MMHG | DIASTOLIC BLOOD PRESSURE: 74 MMHG | BODY MASS INDEX: 24.15 KG/M2

## 2024-02-08 DIAGNOSIS — M47.812 CERVICAL SPONDYLOSIS: ICD-10-CM

## 2024-02-08 DIAGNOSIS — G44.86 CERVICOGENIC HEADACHE: Primary | ICD-10-CM

## 2024-02-08 PROCEDURE — 3078F DIAST BP <80 MM HG: CPT | Mod: CPTII,S$GLB,, | Performed by: PAIN MEDICINE

## 2024-02-08 PROCEDURE — 3008F BODY MASS INDEX DOCD: CPT | Mod: CPTII,S$GLB,, | Performed by: PAIN MEDICINE

## 2024-02-08 PROCEDURE — 1160F RVW MEDS BY RX/DR IN RCRD: CPT | Mod: CPTII,S$GLB,, | Performed by: PAIN MEDICINE

## 2024-02-08 PROCEDURE — 99204 OFFICE O/P NEW MOD 45 MIN: CPT | Mod: S$GLB,,, | Performed by: PAIN MEDICINE

## 2024-02-08 PROCEDURE — 1159F MED LIST DOCD IN RCRD: CPT | Mod: CPTII,S$GLB,, | Performed by: PAIN MEDICINE

## 2024-02-08 PROCEDURE — 3074F SYST BP LT 130 MM HG: CPT | Mod: CPTII,S$GLB,, | Performed by: PAIN MEDICINE

## 2024-02-08 PROCEDURE — 99999 PR PBB SHADOW E&M-EST. PATIENT-LVL III: CPT | Mod: PBBFAC,,, | Performed by: PAIN MEDICINE

## 2024-02-08 NOTE — PROGRESS NOTES
Subjective:     Patient ID: Shena Gomez is a 38 y.o. female    Chief Complaint: Headache      Referred by: Self, Quinten      HPI:    Initial Encounter (2/8/24):  Shena Gomez is a 38 y.o. female who presents today with chronic left-sided neck pain/headaches.  No specific inciting events or injuries noted.  Pain is located the left upper cervical paraspinal region.  Has associated headaches affecting the left occipital/parietal regions.  Pain is intermittent.  Has some days where she does not experience pain.  Does avoid certain activities positions.  States she can not sleep on her left side secondary to severe pain.   This pain is described in detail below.    Physical Therapy:  Yes.  Not effective.    Non-pharmacologic Treatment:  Rest helps.         TENS?  No    Pain Medications:         Currently taking:  Gabapentin, Flexeril, NSAIDs    Has tried in the past:  NSAIDs, Tylenol, tizanidine    Has not tried:  Opioids, TCAs, SNRIs, topical creams    Blood thinners:  None    Interventional Therapies:  None    Relevant Surgeries:  None    Affecting sleep?  Yes    Affecting daily activities? yes    Depressive symptoms? No          SI/HI? No    Work status: Unemployed    Pain Scores:    Best:       0/10  Worst:     10/10  Usually:   8/10  Today:    2/10    Pain Disability Index  Family/Home Responsibilities:: 8  Recreation:: 8  Social Activity:: 8  Occupation:: 8  Sexual Behavior:: 0  Self Care:: 0  Life-Support Activities:: 0  Pain Disability Index (PDI): 32    Review of Systems   Constitutional:  Negative for activity change, appetite change, chills, fatigue, fever and unexpected weight change.   HENT:  Negative for hearing loss.    Eyes:  Negative for visual disturbance.   Respiratory:  Negative for chest tightness and shortness of breath.    Cardiovascular:  Negative for chest pain.   Gastrointestinal:  Negative for abdominal pain, constipation, diarrhea, nausea and vomiting.   Genitourinary:   Negative for difficulty urinating.   Musculoskeletal:  Positive for myalgias, neck pain and neck stiffness. Negative for back pain and gait problem.   Skin:  Negative for rash.   Neurological:  Positive for headaches. Negative for dizziness, weakness, light-headedness and numbness.   Psychiatric/Behavioral:  Positive for sleep disturbance. Negative for hallucinations and suicidal ideas. The patient is not nervous/anxious.        Past Medical History:   Diagnosis Date    Anxiety     Chronic sinusitis     Epigastric pain     Kidney stones     history    Migraines     Nephrolithiasis     OAB (overactive bladder)        Past Surgical History:   Procedure Laterality Date    Benign neoplasm buttock skin Right 10/05/2022    CERVICAL CERCLAGE  2013     SECTION      COLONOSCOPY N/A 2024    Procedure: COLONOSCOPY;  Surgeon: Jefry Singh MD;  Location: [facility];  Service: Endoscopy;  Laterality: N/A;    CYSTOSCOPY N/A 2019    Procedure: CYSTOSCOPY;  Surgeon: Yokasta John MD;  Location: [facility];  Service: OB/GYN;  Laterality: N/A;    ESOPHAGOGASTRODUODENOSCOPY N/A 2019    Procedure: EGD (ESOPHAGOGASTRODUODENOSCOPY);  Surgeon: Ferdinand Cooney MD;  Location: [facility];  Service: General;  Laterality: N/A;    HYSTERECTOMY      LAPAROSCOPIC SALPINGECTOMY Bilateral 2019    Procedure: SALPINGECTOMY, LAPAROSCOPIC;  Surgeon: Yokasta John MD;  Location: [facility];  Service: OB/GYN;  Laterality: Bilateral;    LAPAROSCOPIC TOTAL HYSTERECTOMY N/A 2019    Procedure: HYSTERECTOMY, TOTAL, LAPAROSCOPIC;  Surgeon: Yokasta John MD;  Location: [facility];  Service: OB/GYN;  Laterality: N/A;    SOFT TISSUE BIOPSY Right 10/5/2022    Procedure: BIOPSY, SOFT TISSUE, right medial thigh/gluteal area;  Surgeon: Ferdinand Cooney MD;  Location: [facility];  Service: General;  Laterality: Right;    TUBAL LIGATION  2014       Social History     Socioeconomic History     Marital status:    Tobacco Use    Smoking status: Never    Smokeless tobacco: Never   Substance and Sexual Activity    Alcohol use: Never     Alcohol/week: 0.0 standard drinks of alcohol    Drug use: Never    Sexual activity: Yes     Partners: Male     Birth control/protection: Other-see comments, See Surgical Hx     Comment: Tubal       Review of patient's allergies indicates:   Allergen Reactions    Celestone [betamethasone] Palpitations       Current Outpatient Medications on File Prior to Visit   Medication Sig Dispense Refill    azelastine (ASTELIN) 137 mcg (0.1 %) nasal spray 1 spray (137 mcg total) by Nasal route 2 (two) times daily. 30 mL 5    cyclobenzaprine (FLEXERIL) 5 MG tablet Take 5 mg by mouth 3 (three) times daily as needed for Muscle spasms.      FLUoxetine 10 MG capsule TAKE 1 CAPSULE BY MOUTH EVERY DAY 90 capsule 3    gabapentin (NEURONTIN) 300 MG capsule Take 1 capsule (300 mg total) by mouth 2 (two) times daily. 60 capsule 5    montelukast (SINGULAIR) 10 mg tablet Take 1 tablet (10 mg total) by mouth every evening. 30 tablet 2    tiZANidine (ZANAFLEX) 2 MG tablet TAKE 1 TABLET (2 MG TOTAL) BY MOUTH EVERY 12 HOURS 180 tablet 1     No current facility-administered medications on file prior to visit.       Objective:      /74 (BP Location: Right arm, Patient Position: Sitting)   Pulse 93   Wt 56.1 kg (123 lb 10.9 oz)   LMP 08/26/2019   BMI 24.15 kg/m²     Exam:  GEN:  Well developed, well nourished.  No acute distress.  Normal pain behavior.  HEENT:  No trauma.  Mucous membranes moist.  Nares patent bilaterally.  PSYCH: Normal affect. Thought content appropriate.  CHEST:  Breathing symmetric.  No audible wheezing.  ABD: Soft, non-distended.  SKIN:  Warm, pink, dry.  No rash on exposed areas.    EXT:  No cyanosis, clubbing, or edema.  No color change or changes in nail or hair growth.  NEURO/MUSCULOSKELETAL:  Fully alert, oriented, and appropriate. Speech normal wale. No  cranial nerve deficits.   Gait:  Normal.  5/5 motor strength throughout upper extremities.   Sensory:  No sensory deficit in the upper extremities.   Reflexes:  2 + and symmetric throughout.  Absent Vela's bilaterally.  C-Spine:  Full ROM with pain on extension and left rotation.  Positive facet loading on the left.  Negative Spurling's bilaterally.    Positive TTP over left upper cervical paraspinal muscles      Imaging:    Narrative & Impression    EXAMINATION:  MRI CERVICAL SPINE WITHOUT CONTRAST     CLINICAL HISTORY:  cervicogenic headache;.  Cervicogenic headache     TECHNIQUE:  Multiplanar, multisequence MR images of the cervical spine were acquired without the administration of contrast.     COMPARISON:  Cervical spine radiograph dated 07/13/2023     FINDINGS:  Cervical vertebral body heights are maintained.  No infiltrative T1 marrow process.  Cervical cord is normal in signal and caliber.  Visualized brainstem and cerebellum are unremarkable.  Remaining visualized prevertebral and paraspinal soft tissues demonstrate no acute abnormality.     C2-C3: No significant spinal canal stenosis or neural foraminal impingement.     C3-C4: Modest-mild posterior disc osteophyte without significant spinal canal stenosis or neural foraminal impingement.     C4-C5: Modest-mild posterior disc osteophyte without significant spinal canal stenosis or neural foraminal impingement.     C5-C6: Mild posterior disc osteophyte without significant spinal canal stenosis or neural foraminal impingement.     C6-C7: Modest-mild posterior disc osteophyte without significant spinal canal stenosis or neural foraminal impingement.     C7-T1: No significant posterior disc herniation, spinal canal stenosis, or neural foraminal impingement.     Impression:     Mild cervical spondylosis without significant spinal canal stenosis or neural foraminal encroachment.        Electronically signed by: Emmanuel Soriano  Date:                                             11/06/2023  Time:                                           15:49       Assessment:       Encounter Diagnoses   Name Primary?    Cervicogenic headache Yes    Cervical spondylosis      Plan:       Shena was seen today for headache.    Diagnoses and all orders for this visit:    Cervicogenic headache    Cervical spondylosis        Shena Gomez is a 38 y.o. female with chronic left-sided neck pain and headaches.  Suspicion for left upper cervical facet mediated pain with associated cervicogenic headaches.  Low suspicion for radiculopathy.  Limited if any degeneration of the cervical facet joints, but given patient's age these findings can not rule out pain related to cervical facet joints.    Pertinent imaging studies reviewed by me. Imaging results were discussed with patient.  Schedule for left C3, C4 medial branch and left 3rd occipital nerve blocks x2 under fluoroscopy.  If diagnostic can proceed with radiofrequency ablation.    Return to clinic after procedure to discuss results.            This note was created by combination of typed  and M-Modal dictation. Transcription and phonetic errors may be present.  If there are any questions, please contact me.

## 2024-02-19 ENCOUNTER — PATIENT MESSAGE (OUTPATIENT)
Dept: NEUROLOGY | Facility: CLINIC | Age: 39
End: 2024-02-19
Payer: COMMERCIAL

## 2024-02-23 ENCOUNTER — TELEPHONE (OUTPATIENT)
Dept: PAIN MEDICINE | Facility: CLINIC | Age: 39
End: 2024-02-23
Payer: COMMERCIAL

## 2024-02-23 DIAGNOSIS — M47.812 CERVICAL SPONDYLOSIS: Primary | ICD-10-CM

## 2024-02-23 NOTE — TELEPHONE ENCOUNTER
Spoke with patient. Discussed scheduling her MBB/RFA series. Confirmed dates for the series: 3/14, 3/28, and 4/11/24. Follow up visit scheduled. No further issues discussed.

## 2024-02-28 ENCOUNTER — PATIENT MESSAGE (OUTPATIENT)
Dept: OTOLARYNGOLOGY | Facility: CLINIC | Age: 39
End: 2024-02-28
Payer: COMMERCIAL

## 2024-03-14 ENCOUNTER — PATIENT MESSAGE (OUTPATIENT)
Dept: PAIN MEDICINE | Facility: CLINIC | Age: 39
End: 2024-03-14
Payer: COMMERCIAL

## 2024-03-27 ENCOUNTER — TELEPHONE (OUTPATIENT)
Dept: PAIN MEDICINE | Facility: CLINIC | Age: 39
End: 2024-03-27
Payer: COMMERCIAL

## 2024-03-27 NOTE — TELEPHONE ENCOUNTER
Spoke with patient. Stated that she would like to cancel all of the procedures at this time. Said she is interested in trying Botox injections to possibly address this matter. Procedures to be canceled. No further issues discussed..

## 2024-03-27 NOTE — TELEPHONE ENCOUNTER
----- Message from Elizabeth Max sent at 3/27/2024  8:17 AM CDT -----  Consult/Advisory    Name Of Caller:[place]       Contact Preference:[phone]    Nature of call:  Ptn called to cancel  her procedure

## 2024-04-29 ENCOUNTER — PATIENT MESSAGE (OUTPATIENT)
Dept: PRIMARY CARE CLINIC | Facility: CLINIC | Age: 39
End: 2024-04-29
Payer: COMMERCIAL

## 2024-04-29 ENCOUNTER — OFFICE VISIT (OUTPATIENT)
Dept: OBSTETRICS AND GYNECOLOGY | Facility: CLINIC | Age: 39
End: 2024-04-29
Payer: COMMERCIAL

## 2024-04-29 ENCOUNTER — PATIENT MESSAGE (OUTPATIENT)
Dept: OBSTETRICS AND GYNECOLOGY | Facility: CLINIC | Age: 39
End: 2024-04-29

## 2024-04-29 VITALS
SYSTOLIC BLOOD PRESSURE: 98 MMHG | HEIGHT: 60 IN | DIASTOLIC BLOOD PRESSURE: 62 MMHG | BODY MASS INDEX: 24.68 KG/M2 | WEIGHT: 125.69 LBS

## 2024-04-29 DIAGNOSIS — Z01.419 WELL WOMAN EXAM WITH ROUTINE GYNECOLOGICAL EXAM: Primary | ICD-10-CM

## 2024-04-29 DIAGNOSIS — R53.82 CHRONIC FATIGUE: Primary | ICD-10-CM

## 2024-04-29 DIAGNOSIS — F32.81 PMDD (PREMENSTRUAL DYSPHORIC DISORDER): ICD-10-CM

## 2024-04-29 PROCEDURE — 3074F SYST BP LT 130 MM HG: CPT | Mod: CPTII,S$GLB,, | Performed by: STUDENT IN AN ORGANIZED HEALTH CARE EDUCATION/TRAINING PROGRAM

## 2024-04-29 PROCEDURE — 1160F RVW MEDS BY RX/DR IN RCRD: CPT | Mod: CPTII,S$GLB,, | Performed by: STUDENT IN AN ORGANIZED HEALTH CARE EDUCATION/TRAINING PROGRAM

## 2024-04-29 PROCEDURE — 1159F MED LIST DOCD IN RCRD: CPT | Mod: CPTII,S$GLB,, | Performed by: STUDENT IN AN ORGANIZED HEALTH CARE EDUCATION/TRAINING PROGRAM

## 2024-04-29 PROCEDURE — 3008F BODY MASS INDEX DOCD: CPT | Mod: CPTII,S$GLB,, | Performed by: STUDENT IN AN ORGANIZED HEALTH CARE EDUCATION/TRAINING PROGRAM

## 2024-04-29 PROCEDURE — 99395 PREV VISIT EST AGE 18-39: CPT | Mod: S$GLB,,, | Performed by: STUDENT IN AN ORGANIZED HEALTH CARE EDUCATION/TRAINING PROGRAM

## 2024-04-29 PROCEDURE — 3078F DIAST BP <80 MM HG: CPT | Mod: CPTII,S$GLB,, | Performed by: STUDENT IN AN ORGANIZED HEALTH CARE EDUCATION/TRAINING PROGRAM

## 2024-04-29 PROCEDURE — 99999 PR PBB SHADOW E&M-EST. PATIENT-LVL III: CPT | Mod: PBBFAC,,, | Performed by: STUDENT IN AN ORGANIZED HEALTH CARE EDUCATION/TRAINING PROGRAM

## 2024-04-29 RX ORDER — FLUOXETINE 10 MG/1
10 CAPSULE ORAL DAILY
Qty: 90 CAPSULE | Refills: 3 | Status: SHIPPED | OUTPATIENT
Start: 2024-04-29 | End: 2024-06-04

## 2024-04-29 RX ORDER — BUPROPION HYDROCHLORIDE 150 MG/1
150 TABLET ORAL EVERY MORNING
Qty: 30 TABLET | Refills: 2 | Status: SHIPPED | OUTPATIENT
Start: 2024-04-29 | End: 2024-06-04 | Stop reason: SINTOL

## 2024-04-29 NOTE — PROGRESS NOTES
HPI: Pt is a 38 y.o.  female who presents for routine annual exam.   2019 RAL for SUF- pathology benign  Today c/o   - decreased interest in sex  - causes her stress and feelings of guilt  - psb longer time to orgasm  - admits to busy and stressful day to day life  - her PMDD has improved a lot with fluoxetine 10mg but still notes some symptoms before her period starts  - ++fatigue    Cervical cancer screening: up to date/no longer indicated   History abnormal: no  Breast cancer screening: UTD/n/a   - neg genetics  Colon cancer screening: n/a    Family history breast cancer: KENDELL SAMSON  Family history ovarian cancer: NO  Family history colon cancer: NO        ROS:  GENERAL: Feeling well overall. Denies fever or chills.   SKIN: Denies rash or lesions.   HEAD: Denies head injury or headache.   NODES: Denies enlarged lymph nodes.   CHEST: Denies chest pain or shortness of breath.   CARDIOVASCULAR: Denies palpitations or left sided chest pain.   ABDOMEN: No abdominal pain, constipation, diarrhea, nausea or vomiting   URINARY: No dysuria, hematuria, or burning on urination.  REPRODUCTIVE: See HPI.   BREASTS: Denies pain, lumps, or nipple discharge.   HEMATOLOGIC: No easy bruisability or excessive bleeding.   MUSCULOSKELETAL: Denies joint pain or swelling.   NEUROLOGIC: Denies syncope or weakness.   PSYCHIATRIC: Denies acute depression or anxiety     PE:   APPEARANCE: Well nourished, well developed, White female in no acute distress.  NODES: no inguinal lymphadenopathy  BREASTS: Symmetrical, no skin changes or visible lesions. No palpable masses, nipple discharge or adenopathy bilaterally.  ABDOMEN: Soft. No tenderness or masses. No distention.   VULVA: No lesions. Normal external female genitalia.  URETHRAL MEATUS: Normal size and location, no lesions, no prolapse.  URETHRA: No masses, tenderness, or prolapse.  VAGINA: Moist. No lesions or lacerations noted. No abnormal discharge present. No odor present.   CERVIX: No  lesions or discharge. No cervical motion tenderness.   UTERUS: Normal size, regular shape, mobile, non-tender.  ADNEXA: No tenderness. No fullness or masses palpated in the adnexal regions.   ANUS PERINEUM: Normal.      Diagnosis:  1. Well woman exam with routine gynecological exam    2. PMDD (premenstrual dysphoric disorder)        Plan:     Shena was seen today for well woman.    Diagnoses and all orders for this visit:    Well woman exam with routine gynecological exam    PMDD (premenstrual dysphoric disorder)  -     buPROPion (WELLBUTRIN XL) 150 MG TB24 tablet; Take 1 tablet (150 mg total) by mouth every morning.  -     FLUoxetine 10 MG capsule; Take 1 capsule (10 mg total) by mouth once daily.     Offered fluoxetine 20 mg (increase from 10) as she is cautious about new meds. Also offered continuing current dose of SSRI and adding low dose wellbutrin for psb sexual side effects. Nature of female sex drive discussed. Recommend ángel.      Patient was counseled today on the current  ACS guidelines for cervical cytology screening as well as the current recommendations for breast cancer screening.   She was counseled to follow up with her PCP for other routine health maintenance.    Pap/ Pap+HPV next due: defer indefinitely       F/U 3 months (televisit ok)

## 2024-04-30 RX ORDER — CYCLOBENZAPRINE HCL 5 MG
TABLET ORAL
Qty: 30 TABLET | Refills: 2 | Status: SHIPPED | OUTPATIENT
Start: 2024-04-30

## 2024-05-03 ENCOUNTER — PATIENT MESSAGE (OUTPATIENT)
Dept: OBSTETRICS AND GYNECOLOGY | Facility: CLINIC | Age: 39
End: 2024-05-03
Payer: COMMERCIAL

## 2024-05-07 ENCOUNTER — TELEPHONE (OUTPATIENT)
Dept: NEUROLOGY | Facility: CLINIC | Age: 39
End: 2024-05-07
Payer: COMMERCIAL

## 2024-05-07 NOTE — TELEPHONE ENCOUNTER
----- Message from Onur Barone sent at 5/7/2024 10:34 AM CDT -----  Name of Who is Calling:DIONNE GARZON [[id number]]                   What is the request in detail: PT wants to know who can give her Botox injections she has a referral please assist                   Can the clinic reply by [facility]: No                   What Number to Call Back if not in [facility]: [phone]

## 2024-05-10 ENCOUNTER — PATIENT MESSAGE (OUTPATIENT)
Dept: NEUROLOGY | Facility: CLINIC | Age: 39
End: 2024-05-10
Payer: COMMERCIAL

## 2024-05-17 ENCOUNTER — OFFICE VISIT (OUTPATIENT)
Dept: NEUROLOGY | Facility: CLINIC | Age: 39
End: 2024-05-17
Payer: COMMERCIAL

## 2024-05-17 ENCOUNTER — PATIENT MESSAGE (OUTPATIENT)
Dept: NEUROLOGY | Facility: CLINIC | Age: 39
End: 2024-05-17

## 2024-05-17 DIAGNOSIS — G44.86 CERVICOGENIC HEADACHE: ICD-10-CM

## 2024-05-17 DIAGNOSIS — G43.719 INTRACTABLE CHRONIC MIGRAINE WITHOUT AURA AND WITHOUT STATUS MIGRAINOSUS: Primary | ICD-10-CM

## 2024-05-17 PROCEDURE — 99214 OFFICE O/P EST MOD 30 MIN: CPT | Mod: 95,,, | Performed by: STUDENT IN AN ORGANIZED HEALTH CARE EDUCATION/TRAINING PROGRAM

## 2024-05-17 PROCEDURE — 1159F MED LIST DOCD IN RCRD: CPT | Mod: CPTII,95,, | Performed by: STUDENT IN AN ORGANIZED HEALTH CARE EDUCATION/TRAINING PROGRAM

## 2024-05-17 PROCEDURE — 1160F RVW MEDS BY RX/DR IN RCRD: CPT | Mod: CPTII,95,, | Performed by: STUDENT IN AN ORGANIZED HEALTH CARE EDUCATION/TRAINING PROGRAM

## 2024-05-17 RX ORDER — RIZATRIPTAN BENZOATE 10 MG/1
TABLET ORAL
Qty: 9 TABLET | Refills: 4 | Status: SHIPPED | OUTPATIENT
Start: 2024-05-17

## 2024-05-17 NOTE — PROGRESS NOTES
Patient ID: [id number]  The patient location is: she is at home, [place]  The chief complaint leading to consultation is: Headaches    Visit type: audiovisual    Face to Face time with patient: 18    Each patient to whom he or she provides medical services by telemedicine is:  (1) informed of the relationship between the physician and patient and the respective role of any other health care provider with respect to management of the patient; and (2) notified that he or she may decline to receive medical services by telemedicine and may withdraw from such care at any time.    Notes:     Subjective:     HPI:  Shena Gomez is a very pleasant 38 y.o. female with migraines, anxiety, and history of nephrolithiasis. she is presenting today as a new patient for consideration for Botox.     Headache history  Age at onset: 2 years ago  Course over time: increasing in both severity and frequency  Location: left-sided occipital to temporal, more recently on the left  Character: dull and tension  Intensity: 7-8 on a scale from 1 to 10  Frequency:  up to 15 MHDs .   Duration: several hours  Timing: do not seem to be related to any time of the day   Mild/moderate/severe. Work attendance or other daily activities are affected by the headaches.  Aura: not preceded by an aura  Associated symptoms: N/V and Photosensitivity   Associated neurologic symptoms: dizziness  Precipitating factors: None which have been determined  Aggravating factors:   Home treatment: Resting in a dark room, Goodies/Excedrin, Tylenol, and Imitrex with little improvement.   ER visits: No  Positive Hx of: No Head trauma and renal stones  Is medication overuse contributing to the patient's current migraine burden: No    She has tried PT, was helpful during the course but headaches recurred after completion.   Family history of migraines in her sister.    Headache Medication history:  AED Neuromodulators  MAOIs  Ergot Alkaloids    Acetazolamide (Diamox) []  Phenelzine (Nardil) [] Dihydroergotamine (Migranal) []   Carbamazepine (Tegretol) [] Tranylcypromine (Parnate) [] Ergotamine (Ergomar) []   Gabapentin (Neurontin) (ineffective) [x] Antihistamine/Serotonergic  Triptans    Lacosamide (Vimpat) [] Cyproheptadine (Periactin) [] Almotriptan (Axert) []   Lamotrigine (Lamictal) [] Antihypertensives  Eletriptan (Relpax) []   Levatiracetam (Keppra) [] Atenolol (Tenormin) [] Frovatriptan (Frova) []   Oxcarbazepine (Trileptal) [] Bisoprostol (Zebeta) [] Naratriptan (Amerge) []   Phenobarbital [] Candesartan (Atacand) [] Rizatriptan (Maxalt) []     Nebivolol (Bystolic)  Sumatriptan (Imitrex) (ineffective) [x]   Levetiracetam (Keppra)  Cardeilol (Coreg) [] Zolmitriptan (Zomig) []   Phenytoin (Dilantin) [] Diltiazem (Cardizem) []     Pregabalin (Lyrica) [] Lisinopril (Prinivil, Zestril) [] Combo Abortives    Primidone (Mysoline) [] Metoprolol (Toprol) [] BC Powder []   Tiagabine (Gabatril) [] Nadolol (Corgard) [] Butalbital and Acetaminophen (Bupap) []   Topiramate (Topamax)  (Trokendi) (wasn't started due to Hx of renal stones) [x] Nicardipine (Cardene) []     Vigabatrin (Sabril) [] Nimodipine (Nimotop) [] Butalbital, Acetaminophen, and caffeine (Fioricet) []   Valproic Acid (Depakote) (Divalproex Sodium) [] Propranolol (Inderal) []     Zonisamide (Zonegran) [] Telmisartan (Micardis) [] Butalbital, Aspirin, and caffeine (Fiorinal) []   Benzodiazepines  Timolol (Blocadren) []     Alprazolam (Xanax) [] Verapamil (Calan, Verelan) [] Butalbital, Caffeine, Acetaminophen, and Codeine (Fioricet with Codeine) []   Diazepam (Valium) [] NSAIDs      Lorazepam (Ativan) [] Acetaminophen (Tylenol) [x]     Clonazepam (Klonopin) [] Acetylsalicylic Acid (Aspirin) [] Butalbital, Caffeine, Aspirin, and Codeine  (Fiorinal with Codeine) []   Antidepressants  Diclofenac (Cambia) []     Amitriptyline (Elavil) [] Ibuprofen (Motrin) [x]     Desipramine (Norpramin) [] Indomethacin (Indocin) [] Aspirin,  Caffeine, and Acetaminophen (Excedrin) (Goodys) [x]   Doxepin (Sinequan) [] Ketoprofen (Orudis) []     Fluoxetine (Prozac) [x] Ketorolac (Toradol) [] Acetaminophen, Dichloralphenazone, and Isometheptene (Midrin) []   Imipramine (Tofranil) [] Naproxen (Anaprox) (Aleve) []     Nortriptyline (Pamelor) [] Meclofenamic Acid (Meclomen) []     Venlafaxine (Effexor) [] Meloxicam (Mobic) [] Procedures    Desvenlafazine (Pristiq) [] Monoclonals  Greater occipital nerve block []   Duloxetine (Cymbalta) [] Eptinezumab [] Cervical, Thoracic, Lumbar radiofrequency ablation []   Trazadone [] Erenumab-aooe (Aimovig) [] Spenopalatine ganglion block []   Wellbutrin [x] Galcanezumab (Emgality) [] Occipital neuro stimulation []   Protriptyline (Vivactil) [] Fremanazumab-vfrm (Ajovy)  Cervical, Thoracic, Lumbar, Caudal Epidural steroid injection []   Escitalopram (Lexapro) [] Other [] Sacroiliac joint steroid injection []   Celexa [] Memantine (Namenda) [] Transforaminal epidural steroid injection []   Oral CGRP inhibitors  Botox [] Facet joint injections []   Atogepant (Qulipta) [] Baclofen (Lioresal) [] Cervical, Thoracic, Lumbar medial branch blocks []   Rimegepant (Nurtec) [] Tizanidine (Zanaflex) (ineffective) [x] Cefaly []   Ubrogepant (Ubrelvy) [] Cyclobenzaprine (Flexeril) (drowsiness) [x] Gamma Core []    []   Iovera []    []   Transcranial Magnetic stimulation []     Review of Systems:  Review of Systems   HENT:  Negative for congestion.    Eyes:  Positive for photophobia. Negative for blurred vision and double vision.   Gastrointestinal:  Positive for nausea. Negative for vomiting.   Musculoskeletal:  Negative for falls.   Neurological:  Positive for dizziness and headaches. Negative for sensory change and weakness.   Psychiatric/Behavioral:  Negative for memory loss.    All other systems reviewed and are negative.     Past Medical History:  -------------------------------------    Anxiety    Chronic sinusitis    Epigastric  pain    Kidney stones    history    Migraines    Nephrolithiasis    OAB (overactive bladder)     Allergies:  Review of patient's allergies indicates:   Allergen Reactions    Celestone [betamethasone] Palpitations       Pertinent Family History:  Family History   Problem Relation Name Age of Onset    Breast cancer Maternal Grandmother  68    Breast cancer Maternal Aunt  56    Esophageal cancer Maternal Aunt      Cancer Neg Hx      Colon cancer Neg Hx      Diabetes Neg Hx      Eclampsia Neg Hx      Miscarriages / Stillbirths Neg Hx      Hypertension Neg Hx      Ovarian cancer Neg Hx       labor Neg Hx      Stroke Neg Hx         Pertinent Social History:  Social History     Tobacco Use    Smoking status: Never    Smokeless tobacco: Never   Substance Use Topics    Alcohol use: Never     Alcohol/week: 0.0 standard drinks of alcohol    Drug use: Never       Medications:  Current Outpatient Medications   Medication Instructions    azelastine (ASTELIN) 137 mcg, Nasal, 2 times daily    buPROPion (WELLBUTRIN XL) 150 mg, Oral, Every morning    cyclobenzaprine (FLEXERIL) 5 MG tablet TAKE 1 TABLET BY MOUTH THREE TIMES A DAY AS NEEDED FOR MUSCLE SPASMS    FLUoxetine 10 mg, Oral, Daily    gabapentin (NEURONTIN) 300 mg, Oral, 2 times daily    montelukast (SINGULAIR) 10 mg, Oral, Nightly    tiZANidine (ZANAFLEX) 2 MG tablet TAKE 1 TABLET (2 MG TOTAL) BY MOUTH EVERY 12 HOURS        Objective:     *exam is limited due to the virtual nature of this visit    General:  Well-appearing, well-nourished, NAD, cooperative    Neurologic Exam:   Awake, alert and oriented x3  Speech spontaneous and fluent, intact comprehension.   Adequate fund of knowledge, vocabulary.  EOM intact. No ophthalmoplegia.   Facial expression is full and symmetric.   Hearing is intact.  Antigravity in BUE. No tremor.      Pertinent lab results  Lab Results   Component Value Date    GWCIOLCX19 335 2021    [id number] 34 2019     Lab Results    Component Value Date    RPR Non-reactive 02/24/2014    HEPBSAG Negative 07/12/2013     Lab Results   Component Value Date    TSH 1.51 01/20/2021    FREET4 0.74 11/06/2018    WBC 8.15 07/25/2022    LYMPH 2.5 07/25/2022    LYMPH 30.9 07/25/2022    RBC 4.55 07/25/2022    HGB 14.6 07/25/2022    HCT 42.4 07/25/2022    MCV 93 07/25/2022     07/25/2022     07/25/2022    K 3.9 07/25/2022    CO2 21 (L) 07/25/2022    BUN 17 07/25/2022    CREATININE 0.8 07/25/2022    CALCIUM 8.9 07/25/2022    AST 18 07/25/2022    ALT 9 (L) 07/25/2022       Pertinent imaging results    *Images personally reviewed and interpreted:  11/6/2023  MRI Cervical Spine wo contrast:  Mild degenerative changes without significant canal or foraminal stenosis    Other pertinent studies  None    Assessment:   Shena Gomez is a 38 y.o. female with migraines without aura and anxiety who presents for evaluation of worsening headaches. Characteristics meet the criteria for chronic migraines without aura. she is having >15 headache days per month, she has tried and failed SSRIs, anti seizure medications, and muscle relaxants. She can not take Topamax due to Hx of renal stones and I'm hesitant to start beta blocker due to Hx of hypotension (sBP in the 90s). She will benefit from trial of Botox for chronic migraines. For rescue, we will do a trial of Maxalt (rizatriptan). Lastly, we will obtain imaging of the brain to exclude intracranial pathologies given the change in headaches.     1. Intractable chronic migraine without aura and without status migrainosus    2. Cervicogenic headache      Plan:     - MRI Brain W WO Contrast; Future  - Prior authorization Order      Disclaimer: This note was partly generated using dictation software which may occasionally result in transcription errors that are missed on review.      Based on our encounter today, my overall Medical Decision Making is a Level 4     Complexity of Problem: Moderate (1 or more  chronic illnesses with exacerbation, progression or treatment side effects)  Complexity of Data: Moderate (Ordering each unique test and Independent interpretation of tests)  Risk of Complications and/or morbidity/mortality of Management: Low risk of morbidity from additional diagnostic testing or treatment        Kristine Hobbs MD    [facility]  05/17/2024

## 2024-05-20 ENCOUNTER — PATIENT MESSAGE (OUTPATIENT)
Dept: NEUROLOGY | Facility: CLINIC | Age: 39
End: 2024-05-20
Payer: COMMERCIAL

## 2024-05-20 ENCOUNTER — PATIENT MESSAGE (OUTPATIENT)
Dept: OBSTETRICS AND GYNECOLOGY | Facility: CLINIC | Age: 39
End: 2024-05-20
Payer: COMMERCIAL

## 2024-05-20 DIAGNOSIS — G43.719 INTRACTABLE CHRONIC MIGRAINE WITHOUT AURA AND WITHOUT STATUS MIGRAINOSUS: Primary | ICD-10-CM

## 2024-05-20 NOTE — TELEPHONE ENCOUNTER
Ok I changed it, please help in scheduling. Please let her know that MRI without contrast is not going to be as comprehensive but that's ok if she feels uneasy.

## 2024-05-28 ENCOUNTER — PATIENT MESSAGE (OUTPATIENT)
Dept: NEUROLOGY | Facility: CLINIC | Age: 39
End: 2024-05-28
Payer: COMMERCIAL

## 2024-06-02 ENCOUNTER — E-VISIT (OUTPATIENT)
Dept: PRIMARY CARE CLINIC | Facility: CLINIC | Age: 39
End: 2024-06-02
Payer: COMMERCIAL

## 2024-06-02 DIAGNOSIS — N30.90 CYSTITIS: Primary | ICD-10-CM

## 2024-06-03 PROCEDURE — 99421 OL DIG E/M SVC 5-10 MIN: CPT | Mod: ,,, | Performed by: NURSE PRACTITIONER

## 2024-06-03 RX ORDER — NITROFURANTOIN 25; 75 MG/1; MG/1
100 CAPSULE ORAL 2 TIMES DAILY
Qty: 10 CAPSULE | Refills: 0 | Status: SHIPPED | OUTPATIENT
Start: 2024-06-03 | End: 2024-06-11

## 2024-06-03 NOTE — PROGRESS NOTES
Patient ID: Shena Gomez is a 38 y.o. female.    Chief Complaint: Urinary Tract Infection (Entered automatically based on patient selection in Patient Portal.)    The patient initiated a request through [facility] on 6/2/2024 for evaluation and management with a chief complaint of Urinary Tract Infection (Entered automatically based on patient selection in Patient Portal.)     I evaluated the questionnaire submission on 6/3/2024.    [facility] P[facility] Evisit Uti Questionnaire    6/2/2024 12:12 PM CDT - Filed by Patient   Do you agree to participate in an E-Visit? Yes   If you have any of the following symptoms, please present to your local emergency room or call 911:  I acknowledge   Are you pregnant, could you be pregnant, or are you breast feeding? None of the above   What is the main issue you would like addressed today? Pain, pressure and frequent urination   What symptoms do you currently have? Pain while passing urine   When did your symptoms first appear? 6/1/2024   List what you have done or taken to help your symptoms. OTC AZO   Please indicate whether you have had the following symptoms during the past 24 hours     Urgent urination (a sudden and uncontrollable urge to urinate) Severe   Frequent urination of small amounts of urine (going to the toilet very often) Moderate   Burning pain when urinating Moderate   Incomplete bladder emptying (still feel like you need to urinate again after urination) Moderate   Pain not associated with urination in the lower abdomen below the belly button) Mild   What does your urine look like? I am not sure   Blood seen in the urine None   Flank pain (pain in one or both sides of the lower back) None   Abnormal Vaginal Discharge (abnormal amount, color and/or odor) None   Discharge from the urethra (urinary opening) without urination None   High body temperature/fever? None-<99.5   Please rate how much discomfort you have experience because of the symptoms in the past 24 hours:  Moderate   Please indicate how the symptoms have interfered with your every day activities/work in the past 24 hours: Mild   Please indicate how these symptoms have interfered with your social activities (visiting people, meeting with friends, etc.) in the past 24 hours? Mild   Are you a diabetic? No   Please indicate whether you have the following at the time of completion of this questionnaire: None of the above   Provide any additional information you feel is important.    Please attach any relevant images or files (if you have performed a home test for UTI, please submit a photo of results)    Are you able to take your vital signs? No         Encounter Diagnosis   Name Primary?    Cystitis Yes        No orders of the defined types were placed in this encounter.     Medications Ordered This Encounter   Medications    nitrofurantoin, macrocrystal-monohydrate, (MACROBID) 100 MG capsule     Sig: Take 1 capsule (100 mg total) by mouth 2 (two) times daily.     Dispense:  10 capsule     Refill:  0        Follow up if symptoms worsen or fail to improve.      E-Visit Time Tracking:    Day 1 Time (in minutes): 5    Total Time (in minutes): 5

## 2024-06-04 ENCOUNTER — OFFICE VISIT (OUTPATIENT)
Dept: OBSTETRICS AND GYNECOLOGY | Facility: CLINIC | Age: 39
End: 2024-06-04
Payer: COMMERCIAL

## 2024-06-04 DIAGNOSIS — R63.2 BINGE EATING: ICD-10-CM

## 2024-06-04 DIAGNOSIS — F32.81 PMDD (PREMENSTRUAL DYSPHORIC DISORDER): Primary | ICD-10-CM

## 2024-06-04 DIAGNOSIS — N76.4 VULVAR BOIL: ICD-10-CM

## 2024-06-04 DIAGNOSIS — R39.89 SENSATION OF PRESSURE IN BLADDER AREA: ICD-10-CM

## 2024-06-04 PROCEDURE — 1159F MED LIST DOCD IN RCRD: CPT | Mod: CPTII,95,, | Performed by: STUDENT IN AN ORGANIZED HEALTH CARE EDUCATION/TRAINING PROGRAM

## 2024-06-04 PROCEDURE — 1160F RVW MEDS BY RX/DR IN RCRD: CPT | Mod: CPTII,95,, | Performed by: STUDENT IN AN ORGANIZED HEALTH CARE EDUCATION/TRAINING PROGRAM

## 2024-06-04 PROCEDURE — 99214 OFFICE O/P EST MOD 30 MIN: CPT | Mod: 95,,, | Performed by: STUDENT IN AN ORGANIZED HEALTH CARE EDUCATION/TRAINING PROGRAM

## 2024-06-04 RX ORDER — FLUOXETINE HYDROCHLORIDE 20 MG/1
20 CAPSULE ORAL DAILY
Qty: 90 CAPSULE | Refills: 3 | Status: SHIPPED | OUTPATIENT
Start: 2024-06-04 | End: 2024-06-11

## 2024-06-04 NOTE — PROGRESS NOTES
The patient location is: home ([place])  The chief complaint leading to consultation is: medication f/u    Visit type: audiovisual    Face to Face time with patient: 14 min  28 minutes of total time spent on the encounter, which includes face to face time and non-face to face time preparing to see the patient (eg, review of tests), Obtaining and/or reviewing separately obtained history, Documenting clinical information in the electronic or other health record, Independently interpreting results (not separately reported) and communicating results to the patient/family/caregiver, or Care coordination (not separately reported).         Each patient to whom he or she provides medical services by telemedicine is:  (1) informed of the relationship between the physician and patient and the respective role of any other health care provider with respect to management of the patient; and (2) notified that he or she may decline to receive medical services by telemedicine and may withdraw from such care at any time.    Notes:    HPI:  Shena Gomez is a 38 y.o.  seen virtually for medication f/u. Discussed the following concerns:   PMDD: 2024 continued fluoxetine 10 mg, added wellbutrin 150 mg hopefully to combat some of the sexual side effects but also for more of the depressive/low motivation symptoms  Noticed urinary leaking and bladder pressure with wellbutrin and was worried about side effects. She stopped the medicine and these symptoms did not get better. She did do an e-visit and is being treated for UTI and noticing some of the pressure improving.   She also noticed on wellbutrin she was more irritable/angry  It did help with the sexual side effects so she is disappointed about other symptoms which outweigh this for her. Wondering if something similar to wellbutrin available.   Says most concerning sx of her PMS/PMDD is the binge eating that comes along with her luteal phase symptoms. It is near-constant, feels  "her body "needs the sugar" and she cannot help but to eat things like candy. Says she is putting on weight.   Urinary symptoms as above  Improving on empiric abx, leaking TBD  Says new tender small "cyst" on labia  Surrounding tissue is not red or painful    ROS:  GENERAL: Feeling well overall. Denies fever or chills.   SKIN: Denies rash or lesions.   HEAD: Denies head injury or headache.   NODES: Denies enlarged lymph nodes.   CHEST: Denies chest pain or shortness of breath.   CARDIOVASCULAR: Denies palpitations or left sided chest pain.   ABDOMEN: No abdominal pain, constipation, diarrhea, nausea, vomiting or rectal bleeding.   URINARY: No dysuria, hematuria, or burning on urination.  REPRODUCTIVE: See HPI.   BREASTS: Denies pain, lumps, or nipple discharge.   HEMATOLOGIC: No easy bruisability or excessive bleeding.   MUSCULOSKELETAL: Denies joint pain or swelling.   NEUROLOGIC: Denies syncope or weakness.   PSYCHIATRIC: Denies depression, anxiety or mood swings.    PE:   APPEARANCE: Well nourished, well developed female in no acute distress.  RESPIRATORY: normal respiratory effort  NEURO: alert and oriented  PSYCH: normal mood and affect      Diagnosis:  1. PMDD (premenstrual dysphoric disorder)    2. Sensation of pressure in bladder area    3. Binge eating    4. Vulvar boil        Plan:     Diagnoses and all orders for this visit:    PMDD (premenstrual dysphoric disorder)  -     Ambulatory referral/consult to Psychiatry; Future  -     FLUoxetine 20 MG capsule; Take 1 capsule (20 mg total) by mouth once daily.  Again reviewed firstline treatment for PMDD is SSRI.   Certainly role for therapy/counseling.   We will go ahead and increase her to fluoxetine 20 mg as she is tolerating this well.   She has declined birth control in the past, which is alternate or additional treatment for PMDD, as it eliminates the hormonal fluctuations that trigger most PMS/PMDD symptoms. May need to re-visit.   Regarding addition of " other medications, as well as her worsening binge eating, I recommend psychiatry evaluation as we are now getting out of my scope; happy to co-manage her PMS/PMDD     Sensation of pressure in bladder area  -     CULTURE, URINE; Future   If leaking/pressure do not improve she will drop off urine sample    If ongoing incontinence will refer to PFPT    Binge eating  -     Ambulatory referral/consult to Psychiatry; Future    Vulvar boil  Psb cyst vs folliculitis vs boil  BID compress/sitz bath  Local care  Usually contained infection not requiring abx  Monitor for cellulitis and/or worsening sx  Notify me if worsens    After today will defer further medication adjustments to psychiatry    RTC for WWE or sooner ISELA Yeung MD  Obstetrics & Gynecology   [facility]

## 2024-06-11 ENCOUNTER — OFFICE VISIT (OUTPATIENT)
Dept: PRIMARY CARE CLINIC | Facility: CLINIC | Age: 39
End: 2024-06-11
Payer: COMMERCIAL

## 2024-06-11 VITALS
HEIGHT: 60 IN | HEART RATE: 90 BPM | WEIGHT: 126.44 LBS | OXYGEN SATURATION: 96 % | BODY MASS INDEX: 24.82 KG/M2 | RESPIRATION RATE: 18 BRPM | DIASTOLIC BLOOD PRESSURE: 70 MMHG | SYSTOLIC BLOOD PRESSURE: 116 MMHG

## 2024-06-11 DIAGNOSIS — F41.9 ANXIETY: ICD-10-CM

## 2024-06-11 DIAGNOSIS — R63.5 WEIGHT GAIN: ICD-10-CM

## 2024-06-11 DIAGNOSIS — F32.81 PMDD (PREMENSTRUAL DYSPHORIC DISORDER): ICD-10-CM

## 2024-06-11 DIAGNOSIS — Z00.00 ANNUAL PHYSICAL EXAM: Primary | ICD-10-CM

## 2024-06-11 PROCEDURE — 99214 OFFICE O/P EST MOD 30 MIN: CPT | Mod: S$GLB,,, | Performed by: NURSE PRACTITIONER

## 2024-06-11 PROCEDURE — 99999 PR PBB SHADOW E&M-EST. PATIENT-LVL IV: CPT | Mod: PBBFAC,,, | Performed by: NURSE PRACTITIONER

## 2024-06-11 PROCEDURE — 3008F BODY MASS INDEX DOCD: CPT | Mod: CPTII,S$GLB,, | Performed by: NURSE PRACTITIONER

## 2024-06-11 PROCEDURE — 3078F DIAST BP <80 MM HG: CPT | Mod: CPTII,S$GLB,, | Performed by: NURSE PRACTITIONER

## 2024-06-11 PROCEDURE — 1159F MED LIST DOCD IN RCRD: CPT | Mod: CPTII,S$GLB,, | Performed by: NURSE PRACTITIONER

## 2024-06-11 PROCEDURE — 3074F SYST BP LT 130 MM HG: CPT | Mod: CPTII,S$GLB,, | Performed by: NURSE PRACTITIONER

## 2024-06-11 RX ORDER — DULOXETIN HYDROCHLORIDE 30 MG/1
30 CAPSULE, DELAYED RELEASE ORAL DAILY
Qty: 90 CAPSULE | Refills: 1 | Status: SHIPPED | OUTPATIENT
Start: 2024-06-11 | End: 2025-06-11

## 2024-06-11 RX ORDER — TETANUS TOXOID, REDUCED DIPHTHERIA TOXOID AND ACELLULAR PERTUSSIS VACCINE, ADSORBED 5; 2.5; 8; 8; 2.5 [IU]/.5ML; [IU]/.5ML; UG/.5ML; UG/.5ML; UG/.5ML
SUSPENSION INTRAMUSCULAR
COMMUNITY
Start: 2024-03-30

## 2024-06-11 NOTE — PROGRESS NOTES
Chief Complaint  Chief Complaint   Patient presents with    Annual Exam       HPI    Patient has been on fluoxetine 20 mg for some time for PMDD. Was tried on wellbutrin 150 mg but stopped due to side effects including twitching and irritability. Has been doing well on fluoxetine, feels too tired and sluggish on fluoxetine. Also decreased libido on fluoxetine. Has been nervous to take SSRI in the past and is comfortable with fluoxetine. Concerned that she may become more sluggish increasing the prozac.  Does express interest in changing medications at this time.    Recurrent headaches-Continues to suffer from headaches gets them weekly once or twice primarily at night. Was thought that it was due to neck issues has been on flexeril as needed which does help her relax. Takes flexeril and motrin weekly for headaches and this does help.     Was on abx for a UTI.  Symptoms have resolved on current prescription.            PAST MEDICAL HISTORY:  Past Medical History:   Diagnosis Date    Anxiety     Chronic sinusitis     Epigastric pain     Kidney stones     history    Migraines     Nephrolithiasis     OAB (overactive bladder)        PAST SURGICAL HISTORY:  Past Surgical History:   Procedure Laterality Date    Benign neoplasm buttock skin Right 10/05/2022    CERVICAL CERCLAGE  2013     SECTION      COLONOSCOPY N/A 2024    Procedure: COLONOSCOPY;  Surgeon: Jefry Singh MD;  Location: [facility];  Service: Endoscopy;  Laterality: N/A;    CYSTOSCOPY N/A 2019    Procedure: CYSTOSCOPY;  Surgeon: Yokasta John MD;  Location: [facility] OR;  Service: OB/GYN;  Laterality: N/A;    ESOPHAGOGASTRODUODENOSCOPY N/A 2019    Procedure: EGD (ESOPHAGOGASTRODUODENOSCOPY);  Surgeon: Ferdinand Cooney MD;  Location: [facility];  Service: General;  Laterality: N/A;    HYSTERECTOMY      LAPAROSCOPIC SALPINGECTOMY Bilateral 2019    Procedure: SALPINGECTOMY, LAPAROSCOPIC;  Surgeon: Yokasta VANEGAS  MD Alvaro;  Location: [facility];  Service: OB/GYN;  Laterality: Bilateral;    LAPAROSCOPIC TOTAL HYSTERECTOMY N/A 2019    Procedure: HYSTERECTOMY, TOTAL, LAPAROSCOPIC;  Surgeon: Yokasta John MD;  Location: [facility];  Service: OB/GYN;  Laterality: N/A;    SOFT TISSUE BIOPSY Right 10/5/2022    Procedure: BIOPSY, SOFT TISSUE, right medial thigh/gluteal area;  Surgeon: Ferdinand Cooney MD;  Location: [facility] OR;  Service: General;  Laterality: Right;    TUBAL LIGATION  2014       SOCIAL HISTORY:  Social History     Socioeconomic History    Marital status:    Tobacco Use    Smoking status: Never    Smokeless tobacco: Never   Substance and Sexual Activity    Alcohol use: Never     Alcohol/week: 0.0 standard drinks of alcohol    Drug use: Never    Sexual activity: Yes     Partners: Male     Birth control/protection: Other-see comments, See Surgical Hx     Comment: Tubal       FAMILY HISTORY:  Family History   Problem Relation Name Age of Onset    Breast cancer Maternal Grandmother  68    Breast cancer Maternal Aunt  56    Esophageal cancer Maternal Aunt      Cancer Neg Hx      Colon cancer Neg Hx      Diabetes Neg Hx      Eclampsia Neg Hx      Miscarriages / Stillbirths Neg Hx      Hypertension Neg Hx      Ovarian cancer Neg Hx       labor Neg Hx      Stroke Neg Hx         ALLERGIES AND MEDICATIONS: updated and reviewed.  Review of patient's allergies indicates:   Allergen Reactions    Celestone [betamethasone] Palpitations     Current Outpatient Medications   Medication Sig Dispense Refill    cyclobenzaprine (FLEXERIL) 5 MG tablet TAKE 1 TABLET BY MOUTH THREE TIMES A DAY AS NEEDED FOR MUSCLE SPASMS 30 tablet 2    rizatriptan (MAXALT) 10 MG tablet Take one tablet (10 mg) at the onset of headaches; if symptoms persist or return, may repeat dose after 2 hours. maximum dose: 20 mg per 24 hours 9 tablet 4    BOOSTRIX TDAP 2.5-8-5 Lf-mcg-Lf/0.5mL Syrg injection       DULoxetine  (CYMBALTA) 30 MG capsule Take 1 capsule (30 mg total) by mouth once daily. 90 capsule 1    nitrofurantoin, macrocrystal-monohydrate, (MACROBID) 100 MG capsule Take 1 capsule (100 mg total) by mouth 2 (two) times daily. 10 capsule 0     No current facility-administered medications for this visit.         ROS  Review of Systems   Constitutional:  Negative for chills and fever.   HENT:  Negative for ear pain, postnasal drip and sinus pain.    Respiratory:  Negative for cough and shortness of breath.    Cardiovascular:  Negative for chest pain.   Gastrointestinal:  Negative for diarrhea, nausea and vomiting.   Genitourinary:  Positive for menstrual problem (pmdd).        Recurrent UTI   Psychiatric/Behavioral:  Positive for agitation (pmdd), dysphoric mood and sleep disturbance. The patient is nervous/anxious.            PHYSICAL EXAM  Vitals:    06/11/24 0955   BP: 116/70   BP Location: Right arm   Patient Position: Sitting   BP Method: Medium (Manual)   Pulse: 90   Resp: 18   SpO2: 96%   Weight: 57.3 kg (126 lb 6.9 oz)   Height: 5' (1.524 m)    Body mass index is 24.69 kg/m².  Weight: 57.3 kg (126 lb 6.9 oz)   Height: 5' (152.4 cm)     Physical Exam  Constitutional:       Appearance: She is well-developed.   HENT:      Head: Normocephalic.      Mouth/Throat:      Pharynx: Uvula midline.   Eyes:      Conjunctiva/sclera: Conjunctivae normal.   Cardiovascular:      Rate and Rhythm: Normal rate and regular rhythm.      Pulses: Normal pulses.           Radial pulses are 2+ on the right side and 2+ on the left side.      Heart sounds: Normal heart sounds. No murmur heard.  Pulmonary:      Effort: Pulmonary effort is normal.      Breath sounds: Normal breath sounds. No wheezing.   Lymphadenopathy:      Cervical: No cervical adenopathy.   Skin:     General: Skin is warm and dry.      Findings: No rash.   Neurological:      Mental Status: She is alert and oriented to person, place, and time.           Health Maintenance          Date Due Completion Date    Lipid Panel 07/25/2023 7/25/2022    COVID-19 Vaccine (1 - 2023-24 season) 06/11/2025 (Originally 9/1/2023) ---    Influenza Vaccine (Season Ended) 09/01/2024 11/16/2020    TETANUS VACCINE 03/30/2034 3/30/2024              Assessment & Plan    Problem List Items Addressed This Visit          Unprioritized    PMDD (premenstrual dysphoric disorder)    Relevant Medications    DULoxetine (CYMBALTA) 30 MG capsule  Switch from Prozac to Cymbalta.  Maybe less weight gain and list libido issues on Cymbalta.  Patient not interested in seeing Psychiatry      Anxiety    Relevant Medications    DULoxetine (CYMBALTA) 30 MG capsule     Other Visit Diagnoses       Annual physical exam    -  Primary    Relevant Orders    CBC Auto Differential (Completed)    Comprehensive Metabolic Panel    Lipid Panel    TSH    Weight gain                Follow-up: Follow up in about 4 weeks (around 7/9/2024).    Marisel Mcdaniels    Medication List with Changes/Refills   New Medications    DULOXETINE (CYMBALTA) 30 MG CAPSULE    Take 1 capsule (30 mg total) by mouth once daily.   Current Medications    BOOSTRIX TDAP 2.5-8-5 LF-MCG-LF/0.5ML SYRG INJECTION        CYCLOBENZAPRINE (FLEXERIL) 5 MG TABLET    TAKE 1 TABLET BY MOUTH THREE TIMES A DAY AS NEEDED FOR MUSCLE SPASMS    NITROFURANTOIN, MACROCRYSTAL-MONOHYDRATE, (MACROBID) 100 MG CAPSULE    Take 1 capsule (100 mg total) by mouth 2 (two) times daily.    RIZATRIPTAN (MAXALT) 10 MG TABLET    Take one tablet (10 mg) at the onset of headaches; if symptoms persist or return, may repeat dose after 2 hours. maximum dose: 20 mg per 24 hours   Discontinued Medications    FLUOXETINE 20 MG CAPSULE    Take 1 capsule (20 mg total) by mouth once daily.

## 2024-06-28 ENCOUNTER — PATIENT MESSAGE (OUTPATIENT)
Dept: PRIMARY CARE CLINIC | Facility: CLINIC | Age: 39
End: 2024-06-28
Payer: COMMERCIAL

## 2024-07-03 ENCOUNTER — PROCEDURE VISIT (OUTPATIENT)
Dept: NEUROLOGY | Facility: CLINIC | Age: 39
End: 2024-07-03
Payer: COMMERCIAL

## 2024-07-03 ENCOUNTER — PATIENT MESSAGE (OUTPATIENT)
Dept: NEUROLOGY | Facility: CLINIC | Age: 39
End: 2024-07-03

## 2024-07-03 VITALS — BODY MASS INDEX: 24.8 KG/M2 | WEIGHT: 126.31 LBS | HEIGHT: 60 IN

## 2024-07-03 DIAGNOSIS — G43.719 INTRACTABLE CHRONIC MIGRAINE WITHOUT AURA AND WITHOUT STATUS MIGRAINOSUS: Primary | ICD-10-CM

## 2024-07-03 NOTE — PROCEDURES
BOTOX was performed as an indicated therapy for intractable chronic migraine headaches given that the patient failed __ prophylactic medications    Botulinum Toxin Injection Procedure   Pre-operative diagnosis: Chronic migraine   Post-operative diagnosis: Same   Procedure: Chemical neurolysis   After risks and benefits were explained including bleeding, infection, worsening of pain, damage to the areas being injected, weakness of muscles, loss of muscle control, dysphagia if injecting the head or neck, facial droop if injecting the facial area, painful injection, allergic or other reaction to the medications being injected, and the failure of the procedure to help the problem, a signed consent was obtained.   The patient was placed in a comfortable area and the sites to be treated were identified.The area to be treated was prepped three times with alcohol and the alcohol allowed to dry. Next, a 30 gauge needle was used to inject the medication in the area to be treated.   Area(s) injected:   Total Botox used: 170 Units   Botox wastage: 30 Units   Injection sites:    muscle bilaterally skipped  Procerus muscle skipped  Frontalis muscle bilaterally (a total of 20 units divided into 4 sites)   Temporalis muscle bilaterally (a total of 40 units divided into 8 sites)   Occipitalis muscle bilaterally (a total of 30 units divided into 6 sites)   Cervical paraspinal muscles (a total of 20 units divided into 4 sites)   Trapezius muscle bilaterally (a total of 60 units divided into 6 sites)   Complications: none   RTC for the next Botox injection: 3 months       P.S. skipped the lower forehead due to recent cosmetic Botox      Kristine Hobbs MD  07/03/2024

## 2024-07-03 NOTE — Clinical Note
Botox in 12 weeks please Patient presents after falling off a step yesterday injuring left upper arm & shoulder. Patient has a sling on & was sent from [facility]. ABC's intact.

## 2024-07-05 ENCOUNTER — PATIENT MESSAGE (OUTPATIENT)
Dept: PAIN MEDICINE | Facility: CLINIC | Age: 39
End: 2024-07-05
Payer: COMMERCIAL

## 2024-07-05 ENCOUNTER — PATIENT MESSAGE (OUTPATIENT)
Dept: OBSTETRICS AND GYNECOLOGY | Facility: CLINIC | Age: 39
End: 2024-07-05
Payer: COMMERCIAL

## 2024-07-22 ENCOUNTER — PATIENT MESSAGE (OUTPATIENT)
Dept: PRIMARY CARE CLINIC | Facility: CLINIC | Age: 39
End: 2024-07-22
Payer: COMMERCIAL

## 2024-07-23 ENCOUNTER — PATIENT MESSAGE (OUTPATIENT)
Dept: PRIMARY CARE CLINIC | Facility: CLINIC | Age: 39
End: 2024-07-23

## 2024-07-23 ENCOUNTER — OFFICE VISIT (OUTPATIENT)
Dept: PRIMARY CARE CLINIC | Facility: CLINIC | Age: 39
End: 2024-07-23
Payer: COMMERCIAL

## 2024-07-23 VITALS
BODY MASS INDEX: 23.94 KG/M2 | OXYGEN SATURATION: 98 % | RESPIRATION RATE: 19 BRPM | SYSTOLIC BLOOD PRESSURE: 112 MMHG | HEART RATE: 94 BPM | DIASTOLIC BLOOD PRESSURE: 72 MMHG | HEIGHT: 60 IN | WEIGHT: 121.94 LBS

## 2024-07-23 DIAGNOSIS — F32.81 PMDD (PREMENSTRUAL DYSPHORIC DISORDER): Primary | ICD-10-CM

## 2024-07-23 DIAGNOSIS — F32.81 PMDD (PREMENSTRUAL DYSPHORIC DISORDER): ICD-10-CM

## 2024-07-23 DIAGNOSIS — F41.9 ANXIETY: ICD-10-CM

## 2024-07-23 PROCEDURE — 3008F BODY MASS INDEX DOCD: CPT | Mod: CPTII,S$GLB,, | Performed by: NURSE PRACTITIONER

## 2024-07-23 PROCEDURE — 99214 OFFICE O/P EST MOD 30 MIN: CPT | Mod: S$GLB,,, | Performed by: NURSE PRACTITIONER

## 2024-07-23 PROCEDURE — 1159F MED LIST DOCD IN RCRD: CPT | Mod: CPTII,S$GLB,, | Performed by: NURSE PRACTITIONER

## 2024-07-23 PROCEDURE — 3074F SYST BP LT 130 MM HG: CPT | Mod: CPTII,S$GLB,, | Performed by: NURSE PRACTITIONER

## 2024-07-23 PROCEDURE — 99999 PR PBB SHADOW E&M-EST. PATIENT-LVL III: CPT | Mod: PBBFAC,,, | Performed by: NURSE PRACTITIONER

## 2024-07-23 PROCEDURE — 3078F DIAST BP <80 MM HG: CPT | Mod: CPTII,S$GLB,, | Performed by: NURSE PRACTITIONER

## 2024-07-23 RX ORDER — LISDEXAMFETAMINE DIMESYLATE CAPSULES 10 MG/1
10 CAPSULE ORAL DAILY PRN
Qty: 30 CAPSULE | Refills: 0 | Status: SHIPPED | OUTPATIENT
Start: 2024-07-23 | End: 2024-07-23 | Stop reason: SDUPTHER

## 2024-07-23 RX ORDER — DESVENLAFAXINE SUCCINATE 25 MG/1
25 TABLET, EXTENDED RELEASE ORAL DAILY
Qty: 90 TABLET | Refills: 1 | Status: SHIPPED | OUTPATIENT
Start: 2024-07-23

## 2024-07-23 RX ORDER — LISDEXAMFETAMINE DIMESYLATE CAPSULES 10 MG/1
10 CAPSULE ORAL DAILY PRN
Qty: 30 CAPSULE | Refills: 0 | Status: SHIPPED | OUTPATIENT
Start: 2024-07-23

## 2024-07-23 NOTE — PROGRESS NOTES
Chief Complaint  Chief Complaint   Patient presents with    Follow-up     4 week     Medication Management     Discuss Cymbalta        HPI    Patient has been on cymbalta for approximately 4 weeks with continued issues with PMDD. No improvement in issues with menstrual period. Not really happy with the results of the antidepressants. Has lost 5.5 lbs since starting. Continues with BED particularly around her menstrual period including fatigue. Irritability and binge eating disorder. Under the care of obgyn who treated her with wellbutrin and fluoxetine. Muscle jerking with the wellbutrin. Has not seen psychiatry in the past. Liked the fluoxetine better because she did not have muscle jerks.             PAST MEDICAL HISTORY:  Past Medical History:   Diagnosis Date    Anxiety     Chronic sinusitis     Epigastric pain     Kidney stones     history    Migraines     Nephrolithiasis     OAB (overactive bladder)        PAST SURGICAL HISTORY:  Past Surgical History:   Procedure Laterality Date    Benign neoplasm buttock skin Right 10/05/2022    CERVICAL CERCLAGE  2013     SECTION      COLONOSCOPY N/A 2024    Procedure: COLONOSCOPY;  Surgeon: Jefry Singh MD;  Location: [facility];  Service: Endoscopy;  Laterality: N/A;    CYSTOSCOPY N/A 2019    Procedure: CYSTOSCOPY;  Surgeon: Yokasta John MD;  Location: [facility];  Service: OB/GYN;  Laterality: N/A;    ESOPHAGOGASTRODUODENOSCOPY N/A 2019    Procedure: EGD (ESOPHAGOGASTRODUODENOSCOPY);  Surgeon: Ferdinand Cooney MD;  Location: [facility];  Service: General;  Laterality: N/A;    HYSTERECTOMY      LAPAROSCOPIC SALPINGECTOMY Bilateral 2019    Procedure: SALPINGECTOMY, LAPAROSCOPIC;  Surgeon: Yokasta John MD;  Location: [facility];  Service: OB/GYN;  Laterality: Bilateral;    LAPAROSCOPIC TOTAL HYSTERECTOMY N/A 2019    Procedure: HYSTERECTOMY, TOTAL, LAPAROSCOPIC;  Surgeon: Yokasta John MD;  Location:  [facility] OR;  Service: OB/GYN;  Laterality: N/A;    SOFT TISSUE BIOPSY Right 10/5/2022    Procedure: BIOPSY, SOFT TISSUE, right medial thigh/gluteal area;  Surgeon: Ferdinand Cooney MD;  Location: [facility] OR;  Service: General;  Laterality: Right;    TUBAL LIGATION  2014       SOCIAL HISTORY:  Social History     Socioeconomic History    Marital status:    Tobacco Use    Smoking status: Never    Smokeless tobacco: Never   Substance and Sexual Activity    Alcohol use: Never     Alcohol/week: 0.0 standard drinks of alcohol    Drug use: Never    Sexual activity: Yes     Partners: Male     Birth control/protection: Other-see comments, See Surgical Hx     Comment: Tubal       FAMILY HISTORY:  Family History   Problem Relation Name Age of Onset    Breast cancer Maternal Grandmother  68    Breast cancer Maternal Aunt  56    Esophageal cancer Maternal Aunt      Cancer Neg Hx      Colon cancer Neg Hx      Diabetes Neg Hx      Eclampsia Neg Hx      Miscarriages / Stillbirths Neg Hx      Hypertension Neg Hx      Ovarian cancer Neg Hx       labor Neg Hx      Stroke Neg Hx         ALLERGIES AND MEDICATIONS: updated and reviewed.  Review of patient's allergies indicates:   Allergen Reactions    Celestone [betamethasone] Palpitations     Current Outpatient Medications   Medication Sig Dispense Refill    cyclobenzaprine (FLEXERIL) 5 MG tablet TAKE 1 TABLET BY MOUTH THREE TIMES A DAY AS NEEDED FOR MUSCLE SPASMS 30 tablet 2    DULoxetine (CYMBALTA) 30 MG capsule Take 1 capsule (30 mg total) by mouth once daily. 90 capsule 1    rizatriptan (MAXALT) 10 MG tablet Take one tablet (10 mg) at the onset of headaches; if symptoms persist or return, may repeat dose after 2 hours. maximum dose: 20 mg per 24 hours 9 tablet 4    desvenlafaxine succinate (PRISTIQ) 25 mg Tb24 Take 1 tablet (25 mg total) by mouth Daily. 90 tablet 1    lisdexamfetamine (VYVANSE) 10 mg Cap Take 1 capsule (10 mg total) by mouth daily as needed  (concentration). 30 capsule 0     No current facility-administered medications for this visit.         ROS  Review of Systems   Constitutional:  Positive for unexpected weight change (Weight gain). Negative for chills and fever.   HENT:  Negative for ear pain, postnasal drip and sinus pain.    Respiratory:  Negative for cough and shortness of breath.    Cardiovascular:  Negative for chest pain.   Gastrointestinal:  Negative for diarrhea, nausea and vomiting.   Genitourinary:  Positive for menstrual problem.   Psychiatric/Behavioral:  Positive for agitation (Irritability) and dysphoric mood. Negative for sleep disturbance. The patient is nervous/anxious.            PHYSICAL EXAM  Vitals:    07/23/24 1130   BP: 112/72   BP Location: Left arm   Patient Position: Sitting   BP Method: Medium (Manual)   Pulse: 94   Resp: 19   SpO2: 98%   Weight: 55.3 kg (121 lb 14.6 oz)   Height: 5' (1.524 m)    Body mass index is 23.81 kg/m².  Weight: 55.3 kg (121 lb 14.6 oz)   Height: 5' (152.4 cm)     Physical Exam  Constitutional:       Appearance: She is well-developed.   HENT:      Head: Normocephalic.      Mouth/Throat:      Pharynx: Uvula midline.   Eyes:      Conjunctiva/sclera: Conjunctivae normal.   Cardiovascular:      Rate and Rhythm: Normal rate and regular rhythm.      Pulses: Normal pulses.           Radial pulses are 2+ on the right side and 2+ on the left side.      Heart sounds: Normal heart sounds. No murmur heard.  Pulmonary:      Effort: Pulmonary effort is normal.      Breath sounds: Normal breath sounds. No wheezing.   Lymphadenopathy:      Cervical: No cervical adenopathy.   Skin:     General: Skin is warm and dry.      Findings: No rash.   Neurological:      Mental Status: She is alert and oriented to person, place, and time.           Health Maintenance         Date Due Completion Date    COVID-19 Vaccine (1 - 2023-24 season) 06/11/2025 (Originally 9/1/2023) ---    Influenza Vaccine (1) 09/01/2024 11/16/2020     Lipid Panel 06/11/2025 6/11/2024    TETANUS VACCINE 03/30/2034 3/30/2024              Assessment & Plan    Problem List Items Addressed This Visit          Unprioritized    PMDD (premenstrual dysphoric disorder) - Primary    Relevant Medications    desvenlafaxine succinate (PRISTIQ) 25 mg Tb24    lisdexamfetamine (VYVANSE) 10 mg Cap    Switch from Cymbalta to Pristiq.  Vyvanse for concentration and excessive eating.      Anxiety       Follow-up: Follow up in about 4 weeks (around 8/20/2024) for follow up via portal.    Marisel Mcdaniels    Medication List with Changes/Refills   New Medications    DESVENLAFAXINE SUCCINATE (PRISTIQ) 25 MG TB24    Take 1 tablet (25 mg total) by mouth Daily.    LISDEXAMFETAMINE (VYVANSE) 10 MG CAP    Take 1 capsule (10 mg total) by mouth daily as needed (concentration).   Current Medications    CYCLOBENZAPRINE (FLEXERIL) 5 MG TABLET    TAKE 1 TABLET BY MOUTH THREE TIMES A DAY AS NEEDED FOR MUSCLE SPASMS    DULOXETINE (CYMBALTA) 30 MG CAPSULE    Take 1 capsule (30 mg total) by mouth once daily.    RIZATRIPTAN (MAXALT) 10 MG TABLET    Take one tablet (10 mg) at the onset of headaches; if symptoms persist or return, may repeat dose after 2 hours. maximum dose: 20 mg per 24 hours   Discontinued Medications    BOOSTRIX TDAP 2.5-8-5 LF-MCG-LF/0.5ML SYRG INJECTION

## 2024-07-26 ENCOUNTER — PATIENT MESSAGE (OUTPATIENT)
Dept: PRIMARY CARE CLINIC | Facility: CLINIC | Age: 39
End: 2024-07-26
Payer: COMMERCIAL

## 2024-08-18 ENCOUNTER — PATIENT MESSAGE (OUTPATIENT)
Dept: PRIMARY CARE CLINIC | Facility: CLINIC | Age: 39
End: 2024-08-18
Payer: COMMERCIAL

## 2024-08-29 ENCOUNTER — PATIENT MESSAGE (OUTPATIENT)
Dept: GASTROENTEROLOGY | Facility: CLINIC | Age: 39
End: 2024-08-29
Payer: COMMERCIAL

## 2024-08-30 ENCOUNTER — PATIENT MESSAGE (OUTPATIENT)
Dept: NEUROLOGY | Facility: CLINIC | Age: 39
End: 2024-08-30
Payer: COMMERCIAL

## 2024-08-30 NOTE — TELEPHONE ENCOUNTER
We can try to schedule for 10 or 11 weeks (week of sep 11th or 18th) and see if her insurance approves. She should avoid getting any more cosmetic until then so we can perform our full migraine protocol.

## 2024-09-10 ENCOUNTER — PATIENT MESSAGE (OUTPATIENT)
Dept: PRIMARY CARE CLINIC | Facility: CLINIC | Age: 39
End: 2024-09-10
Payer: COMMERCIAL

## 2024-09-19 ENCOUNTER — PATIENT MESSAGE (OUTPATIENT)
Dept: PRIMARY CARE CLINIC | Facility: CLINIC | Age: 39
End: 2024-09-19
Payer: COMMERCIAL

## 2024-09-20 ENCOUNTER — E-VISIT (OUTPATIENT)
Dept: PRIMARY CARE CLINIC | Facility: CLINIC | Age: 39
End: 2024-09-20
Payer: COMMERCIAL

## 2024-09-20 ENCOUNTER — PATIENT MESSAGE (OUTPATIENT)
Dept: PRIMARY CARE CLINIC | Facility: CLINIC | Age: 39
End: 2024-09-20

## 2024-09-20 DIAGNOSIS — N30.00 ACUTE CYSTITIS WITHOUT HEMATURIA: Primary | ICD-10-CM

## 2024-09-20 RX ORDER — SULFAMETHOXAZOLE AND TRIMETHOPRIM 800; 160 MG/1; MG/1
1 TABLET ORAL 2 TIMES DAILY
Qty: 20 TABLET | Refills: 0 | Status: SHIPPED | OUTPATIENT
Start: 2024-09-20 | End: 2024-09-30

## 2024-09-20 NOTE — PROGRESS NOTES
Subjective:       Patient ID: Shena Gomez is a 39 y.o. female.    Chief Complaint: General Illness (Entered automatically based on patient selection in [facility].)    General Illness      Pt with symptoms of urinary frequency pelvic pressure and left flank pain   Review of Systems    Objective:      Physical Exam    Assessment:      1. Acute cystitis without hematuria        Plan:       Acute cystitis without hematuria  -     sulfamethoxazole-trimethoprim 800-160mg (BACTRIM DS) 800-160 mg Tab; Take 1 tablet by mouth 2 (two) times daily. for 10 days  Dispense: 20 tablet; Refill: 0  -     Urinalysis; Future; Expected date: 09/20/2024        Medication List with Changes/Refills   New Medications    SULFAMETHOXAZOLE-TRIMETHOPRIM 800-160MG (BACTRIM DS) 800-160 MG TAB    Take 1 tablet by mouth 2 (two) times daily. for 10 days   Current Medications    CYCLOBENZAPRINE (FLEXERIL) 5 MG TABLET    TAKE 1 TABLET BY MOUTH THREE TIMES A DAY AS NEEDED FOR MUSCLE SPASMS    DESVENLAFAXINE SUCCINATE (PRISTIQ) 25 MG TB24    Take 1 tablet (25 mg total) by mouth Daily.    DULOXETINE (CYMBALTA) 30 MG CAPSULE    Take 1 capsule (30 mg total) by mouth once daily.    LISDEXAMFETAMINE (VYVANSE) 10 MG CAP    Take 1 capsule (10 mg total) by mouth daily as needed (concentration).    RIZATRIPTAN (MAXALT) 10 MG TABLET    Take one tablet (10 mg) at the onset of headaches; if symptoms persist or return, may repeat dose after 2 hours. maximum dose: 20 mg per 24 hours

## 2024-09-25 ENCOUNTER — PROCEDURE VISIT (OUTPATIENT)
Dept: NEUROLOGY | Facility: CLINIC | Age: 39
End: 2024-09-25
Payer: COMMERCIAL

## 2024-09-25 VITALS
HEART RATE: 111 BPM | HEIGHT: 61 IN | BODY MASS INDEX: 22.89 KG/M2 | WEIGHT: 121.25 LBS | DIASTOLIC BLOOD PRESSURE: 81 MMHG | SYSTOLIC BLOOD PRESSURE: 117 MMHG

## 2024-09-25 DIAGNOSIS — G44.86 CERVICOGENIC HEADACHE: ICD-10-CM

## 2024-09-25 DIAGNOSIS — G43.719 INTRACTABLE CHRONIC MIGRAINE WITHOUT AURA AND WITHOUT STATUS MIGRAINOSUS: Primary | ICD-10-CM

## 2024-09-25 RX ORDER — RIZATRIPTAN BENZOATE 10 MG/1
TABLET ORAL
Qty: 9 TABLET | Refills: 5 | Status: SHIPPED | OUTPATIENT
Start: 2024-09-25

## 2024-09-25 NOTE — PROCEDURES
BOTOX was performed as an indicated therapy for intractable chronic migraine headaches given that the patient failed __ prophylactic medications    Botulinum Toxin Injection Procedure   Pre-operative diagnosis: Chronic migraine   Post-operative diagnosis: Same   Procedure: Chemical neurolysis   After risks and benefits were explained including bleeding, infection, worsening of pain, damage to the areas being injected, weakness of muscles, loss of muscle control, dysphagia if injecting the head or neck, facial droop if injecting the facial area, painful injection, allergic or other reaction to the medications being injected, and the failure of the procedure to help the problem, a signed consent was obtained.   The patient was placed in a comfortable area and the sites to be treated were identified.The area to be treated was prepped three times with alcohol and the alcohol allowed to dry. Next, a 30 gauge needle was used to inject the medication in the area to be treated.   Area(s) injected:   Total Botox used: 155 Units   Botox wastage: 45 Units   Injection sites:    muscle bilaterally ( a total of 10 units divided into 2 sites)   Procerus muscle (5 units)   Frontalis muscle bilaterally (a total of 20 units divided into 4 sites)   Temporalis muscle bilaterally (a total of 40 units divided into 8 sites)   Occipitalis muscle bilaterally (a total of 30 units divided into 6 sites)   Cervical paraspinal muscles (a total of 20 units divided into 4 sites)   Trapezius muscle bilaterally (a total of 30 units divided into 6 sites)   Complications: none         RTC for the next Botox injection: 3 months     Interval hx: No headaches within the first 2 months after Botox injection, within the last couple weeks she has been getting more migraines, partially triggered by allergies.      Kristine Hobbs MD  09/25/2024

## 2024-10-01 ENCOUNTER — TELEPHONE (OUTPATIENT)
Dept: ORTHOPEDICS | Facility: CLINIC | Age: 39
End: 2024-10-01
Payer: COMMERCIAL

## 2024-10-01 DIAGNOSIS — M25.562 PAIN IN BOTH KNEES, UNSPECIFIED CHRONICITY: Primary | ICD-10-CM

## 2024-10-01 DIAGNOSIS — M25.561 PAIN IN BOTH KNEES, UNSPECIFIED CHRONICITY: Primary | ICD-10-CM

## 2024-10-01 NOTE — TELEPHONE ENCOUNTER
CALLED THE PT TO CONFIRM HER APPT AND TO INFORM HER THAT X-RAYS ORDERED AND SCHEDULED FOR  HER PRIOR TO HER ORTHO APPT. NO ANSWER SERENAM

## 2024-10-02 ENCOUNTER — OFFICE VISIT (OUTPATIENT)
Dept: ORTHOPEDICS | Facility: CLINIC | Age: 39
End: 2024-10-02
Payer: COMMERCIAL

## 2024-10-02 VITALS
SYSTOLIC BLOOD PRESSURE: 109 MMHG | HEART RATE: 91 BPM | WEIGHT: 121.94 LBS | HEIGHT: 61 IN | BODY MASS INDEX: 23.02 KG/M2 | DIASTOLIC BLOOD PRESSURE: 76 MMHG

## 2024-10-02 DIAGNOSIS — M25.562 BILATERAL CHRONIC KNEE PAIN: Primary | ICD-10-CM

## 2024-10-02 DIAGNOSIS — G89.29 BILATERAL CHRONIC KNEE PAIN: Primary | ICD-10-CM

## 2024-10-02 DIAGNOSIS — M25.561 BILATERAL CHRONIC KNEE PAIN: Primary | ICD-10-CM

## 2024-10-02 PROCEDURE — 99999 PR PBB SHADOW E&M-EST. PATIENT-LVL III: CPT | Mod: PBBFAC,,,

## 2024-10-02 RX ORDER — TRIAMCINOLONE ACETONIDE 40 MG/ML
40 INJECTION, SUSPENSION INTRA-ARTICULAR; INTRAMUSCULAR
Status: DISCONTINUED | OUTPATIENT
Start: 2024-10-02 | End: 2024-10-02 | Stop reason: HOSPADM

## 2024-10-02 RX ORDER — TRIAMCINOLONE ACETONIDE 40 MG/ML
40 INJECTION, SUSPENSION INTRA-ARTICULAR; INTRAMUSCULAR
Status: COMPLETED | OUTPATIENT
Start: 2024-10-02 | End: 2024-10-02

## 2024-10-02 RX ADMIN — TRIAMCINOLONE ACETONIDE 40 MG: 40 INJECTION, SUSPENSION INTRA-ARTICULAR; INTRAMUSCULAR at 11:10

## 2024-10-02 NOTE — PROCEDURES
Large Joint Aspiration/Injection: R knee    Date/Time: 10/2/2024 11:30 AM    Performed by: Prema Mohan PA-C  Authorized by: Prema Mohan PA-C    Consent Done?:  Yes (Verbal)  Indications:  Pain  Site marked: the procedure site was marked    Timeout: prior to procedure the correct patient, procedure, and site was verified    Prep: patient was prepped and draped in usual sterile fashion      Local anesthesia used?: Yes    Local anesthetic:  Bupivacaine 0.25% without epinephrine and topical anesthetic  Anesthetic total (ml):  4      Details:  Needle Size:  22 G  Ultrasonic Guidance for needle placement?: No    Approach:  Anterolateral  Location:  Knee  Site:  R knee  Medications:  40 mg triamcinolone acetonide 40 mg/mL  Patient tolerance:  Patient tolerated the procedure well with no immediate complications

## 2024-10-02 NOTE — PROGRESS NOTES
Patient ID: Shena Gomez is a 39 y.o. female    Pain of the Left Knee and Pain of the Right Knee    History of Present Illness:    Shena Gomez presents to clinic for bilateral knee pain, R = L. Patient denies known SHARITA. The pain started years ago but worsened over last few months ago and is becoming progressively worse.  Pain is located over (points to)  patella  bilateral knee. She reports that the pain is a 0 /10 pain today. Pain worsened in afternoon/end of day. Feels being on her knees more worsens. The pain is affecting ADLs and limiting desired level of activity. Denies numbness, tingling, radiation and inability to bear weight.    Trial of  tylenol/anti-inflammatories  with little improvement. Denies PT, injections or surgery to knees.    Mechanical symptoms: -  Instability: -    Occupation:     Ambulating: unassisted  Diabetic: no  Smoking: no  Hx of DVT/PE: no    PAST MEDICAL HISTORY:   Past Medical History:   Diagnosis Date    Anxiety     Chronic sinusitis     Epigastric pain     Kidney stones     history    Migraines     Nephrolithiasis     OAB (overactive bladder)      PAST SURGICAL HISTORY:   Past Surgical History:   Procedure Laterality Date    Benign neoplasm buttock skin Right 10/05/2022    CERVICAL CERCLAGE  2013     SECTION      COLONOSCOPY N/A 2024    Procedure: COLONOSCOPY;  Surgeon: Jefry Singh MD;  Location: [facility];  Service: Endoscopy;  Laterality: N/A;    CYSTOSCOPY N/A 2019    Procedure: CYSTOSCOPY;  Surgeon: Yokasta John MD;  Location: [facility] OR;  Service: OB/GYN;  Laterality: N/A;    ESOPHAGOGASTRODUODENOSCOPY N/A 2019    Procedure: EGD (ESOPHAGOGASTRODUODENOSCOPY);  Surgeon: Ferdinand Cooney MD;  Location: [facility];  Service: General;  Laterality: N/A;    HYSTERECTOMY      LAPAROSCOPIC SALPINGECTOMY Bilateral 2019    Procedure: SALPINGECTOMY, LAPAROSCOPIC;  Surgeon: Yokasta John MD;  Location: [facility]  OR;  Service: OB/GYN;  Laterality: Bilateral;    LAPAROSCOPIC TOTAL HYSTERECTOMY N/A 2019    Procedure: HYSTERECTOMY, TOTAL, LAPAROSCOPIC;  Surgeon: Yokasta John MD;  Location: [facility] OR;  Service: OB/GYN;  Laterality: N/A;    SOFT TISSUE BIOPSY Right 10/5/2022    Procedure: BIOPSY, SOFT TISSUE, right medial thigh/gluteal area;  Surgeon: Ferdinand Cooney MD;  Location: [facility] OR;  Service: General;  Laterality: Right;    TUBAL LIGATION  2014     FAMILY HISTORY:   Family History   Problem Relation Name Age of Onset    Breast cancer Maternal Grandmother  68    Breast cancer Maternal Aunt  56    Esophageal cancer Maternal Aunt      Cancer Neg Hx      Colon cancer Neg Hx      Diabetes Neg Hx      Eclampsia Neg Hx      Miscarriages / Stillbirths Neg Hx      Hypertension Neg Hx      Ovarian cancer Neg Hx       labor Neg Hx      Stroke Neg Hx       SOCIAL HISTORY:   Social History     Occupational History    Not on file   Tobacco Use    Smoking status: Never    Smokeless tobacco: Never   Substance and Sexual Activity    Alcohol use: Never     Alcohol/week: 0.0 standard drinks of alcohol    Drug use: Never    Sexual activity: Yes     Partners: Male     Birth control/protection: Other-see comments, See Surgical Hx     Comment: Tubal        MEDICATIONS:   Current Outpatient Medications:     cyclobenzaprine (FLEXERIL) 5 MG tablet, TAKE 1 TABLET BY MOUTH THREE TIMES A DAY AS NEEDED FOR MUSCLE SPASMS, Disp: 30 tablet, Rfl: 2    lisdexamfetamine (VYVANSE) 10 mg Cap, Take 1 capsule (10 mg total) by mouth daily as needed (concentration)., Disp: 30 capsule, Rfl: 0    rizatriptan (MAXALT) 10 MG tablet, Take one tablet (10 mg) at the onset of headaches; if symptoms persist or return, may repeat dose after 2 hours. maximum dose: 20 mg per 24 hours, Disp: 9 tablet, Rfl: 5  No current facility-administered medications for this visit.  ALLERGIES:   Review of patient's allergies indicates:   Allergen  Reactions    Celestone [betamethasone] Palpitations         Physical Exam     Vitals:    10/02/24 1112   BP: 109/76   Pulse: 91     Alert and oriented to person, place and time. No acute distress. Well-groomed, not ill appearing. Pupils round and reactive, normal respiratory effort, no audible wheezing.     OBSERVATION / INSPECTION   Gait:   Nonantalgic   Alignment:  Valgus  Scars:   None   Muscle atrophy: None  Effusion:  None   Warmth:  None   Discoloration:   None     TENDERNESS                 Patella     Negative    Peripatellar medial    +   Peripatellar lateral   Negative   Patellar tendon   Negative   Quad tendon     Negative    Prepatellar Bursa   Negative     Tibial tubercle    Negative    Pes anserine/HS   Negative      ITB     Negative      LCL     Negative  MFC     Negative  LFC     Negative  MCL      Negative  Medial Joint Line    Negative   Lateral Joint Line   Negative  Quadriceps    Negative  Hamstring     Negative            Range of  Motion:        Left knee:   0-140°  Right knee:    0-140°      Patellofemoral examination:     Patella position    Centered  Crepitus (PF):    Absent   Lateral tilt:    Normal   J-sign:     None   Patellofemoral grind:   No pain       MENISCAL SIGNS:     Pain on terminal extension:  Negative  Pain on terminal flexion:  Negative  Darcys maneuver:  Negative     LIGAMENT EXAMINATION:  ACL / Lachman:  normal   PCL-Post.  drawer: normal 0 to 2mm  MCL- Valgus:  normal 0 to 2mm  LCL- Varus:    normal 0 to 2mm    Dial Test: difference c/w other side  At 30° flexion: normal (< 5°)   At 90° flexion: normal (< 5°)       STRENGTH: (* = with pain)   Quadricep   5/5  Hamstrin/5    EXTREMITY NEURO-VASCULAR EXAMINATION:   Sensation:  Grossly intact to light touch all dermatomal regions.   Motor Function:  Fully intact motor function at hip, knee, foot and ankle    DTRs;  quadriceps and  achilles 2+.  No clonus and downgoing Babinski.    Vascular status:  DP and PT pulses  2+, brisk capillary refill, symmetric.     Imaging:     Bilateral knee X-rays ordered/reviewed by me showing no evidence of fracture or dislocation. There is no obvious malalignment. No evidence of masses, lesions or foreign bodies.     Assessment & Plan    Bilateral chronic knee pain  -     triamcinolone acetonide injection 40 mg  -     triamcinolone acetonide injection 40 mg  -     Large Joint Aspiration/Injection: L knee  -     Large Joint Aspiration/Injection: R knee         I made the decision to obtain old records of the patient including previous notes and imaging. New imaging was ordered today of the extremity or extremities evaluated. I independently reviewed and interpreted the radiographs and/or MRIs/CT scan today as well as prior imaging.    We discussed at length different treatment options including conservative vs surgical management. These include anti-inflammatories, acetaminophen, rest, ice, heat, formal physical therapy including strengthening and stretching exercises, home exercise programs, injections, dry needling, and finally surgical intervention.      Patient here for chronic bilateral knee pain.  She does have valgus deformity and likely vastus medialis weakness.  We discussed treatment options in detail including physical therapy, injections and activity modification.  She elected to proceed with home exercise program, deferred PT.  Also she would like to proceed with bilateral knee injections.  Discussed these are purely with pain and do not change what her knee looks like.  Bilateral knee CSI given, post-injection instructions reviewed.  She will follow up with pain management for her neck pain.    Follow up:  If symptoms worsen or fail to improve, consider MRI  X-rays next visit: none    All questions were answered and patient is agreeable to the above plan.

## 2024-10-02 NOTE — PROCEDURES
Large Joint Aspiration/Injection: L knee    Date/Time: 10/2/2024 11:30 AM    Performed by: Prema Mohan PA-C  Authorized by: Prema Mohan PA-C    Consent Done?:  Yes (Verbal)  Indications:  Pain  Site marked: the procedure site was marked    Timeout: prior to procedure the correct patient, procedure, and site was verified    Prep: patient was prepped and draped in usual sterile fashion      Local anesthesia used?: Yes    Local anesthetic:  Bupivacaine 0.25% without epinephrine and topical anesthetic  Anesthetic total (ml):  4      Details:  Needle Size:  22 G  Ultrasonic Guidance for needle placement?: No    Approach:  Anterolateral  Location:  Knee  Site:  L knee  Medications:  40 mg triamcinolone acetonide 40 mg/mL  Patient tolerance:  Patient tolerated the procedure well with no immediate complications

## 2024-10-07 ENCOUNTER — PATIENT MESSAGE (OUTPATIENT)
Dept: PRIMARY CARE CLINIC | Facility: CLINIC | Age: 39
End: 2024-10-07
Payer: COMMERCIAL

## 2024-10-11 ENCOUNTER — PATIENT MESSAGE (OUTPATIENT)
Dept: PRIMARY CARE CLINIC | Facility: CLINIC | Age: 39
End: 2024-10-11
Payer: COMMERCIAL

## 2024-10-11 ENCOUNTER — OFFICE VISIT (OUTPATIENT)
Dept: PRIMARY CARE CLINIC | Facility: CLINIC | Age: 39
End: 2024-10-11
Payer: COMMERCIAL

## 2024-10-11 VITALS
DIASTOLIC BLOOD PRESSURE: 68 MMHG | OXYGEN SATURATION: 98 % | HEIGHT: 61 IN | TEMPERATURE: 96 F | SYSTOLIC BLOOD PRESSURE: 108 MMHG | RESPIRATION RATE: 15 BRPM | WEIGHT: 125 LBS | HEART RATE: 92 BPM | BODY MASS INDEX: 23.6 KG/M2

## 2024-10-11 DIAGNOSIS — F32.81 PMDD (PREMENSTRUAL DYSPHORIC DISORDER): Primary | ICD-10-CM

## 2024-10-11 DIAGNOSIS — G43.719 INTRACTABLE CHRONIC MIGRAINE WITHOUT AURA AND WITHOUT STATUS MIGRAINOSUS: ICD-10-CM

## 2024-10-11 DIAGNOSIS — F90.2 ATTENTION DEFICIT HYPERACTIVITY DISORDER (ADHD), COMBINED TYPE: ICD-10-CM

## 2024-10-11 PROCEDURE — 99999 PR PBB SHADOW E&M-EST. PATIENT-LVL III: CPT | Mod: PBBFAC,,, | Performed by: NURSE PRACTITIONER

## 2024-10-11 RX ORDER — AMOXICILLIN AND CLAVULANATE POTASSIUM 875; 125 MG/1; MG/1
1 TABLET, FILM COATED ORAL EVERY 12 HOURS
COMMUNITY
Start: 2024-10-05

## 2024-10-11 RX ORDER — DEXTROAMPHETAMINE SACCHARATE, AMPHETAMINE ASPARTATE, DEXTROAMPHETAMINE SULFATE AND AMPHETAMINE SULFATE 2.5; 2.5; 2.5; 2.5 MG/1; MG/1; MG/1; MG/1
1 TABLET ORAL DAILY
Qty: 30 TABLET | Refills: 0 | Status: SHIPPED | OUTPATIENT
Start: 2024-10-11

## 2024-10-11 RX ORDER — PREDNISONE 20 MG/1
20 TABLET ORAL EVERY 12 HOURS
COMMUNITY
Start: 2024-10-05

## 2024-10-11 RX ORDER — FLUOXETINE 10 MG/1
10 CAPSULE ORAL DAILY
Qty: 90 CAPSULE | Refills: 1 | Status: SHIPPED | OUTPATIENT
Start: 2024-10-11 | End: 2025-10-11

## 2024-10-11 RX ORDER — BENZONATATE 200 MG/1
200 CAPSULE ORAL
COMMUNITY
Start: 2024-10-08

## 2024-10-11 RX ORDER — AZELASTINE 1 MG/ML
SPRAY, METERED NASAL
COMMUNITY
Start: 2024-10-08

## 2024-10-11 NOTE — PROGRESS NOTES
Chief Complaint  Chief Complaint   Patient presents with    discuss medication change        HPI    History of Present Illness    CHIEF COMPLAINT:  Shena presents today for medication management and follow-up.    MENTAL HEALTH AND MEDICATIONS:  She reports rarely being in a bad mood, even during her menstrual cycle. She denies feeling depressed but expresses dissatisfaction with constant eating. She prefers fluoxetine for effective mood and anxiety management, particularly around her menstrual cycle. However, she reports significant side effects including extreme fatigue and increased appetite leading to weight gain. She recalls being on the lowest dose of fluoxetine, likely 10 mg. She describes Cymbalta as her worst experience, stating she was never happy and felt irritable while taking it. A combination of fluoxetine and Wellbutrin, prescribed by her gynecologist, caused uncomfortable sweating and overheating, leading to discontinuation. Vyvanse was prescribed for energy and appetite control, but she has not initiated treatment due to cost constraints.    WEIGHT CONCERNS:  She reports gaining 12 lbs since starting fluoxetine, increasing from 113 lbs to 125 lbs. She expresses concern about constant hunger and never feeling full, stating she has never had issues with weight gain before starting this medication. She acknowledges being comfortable with some weight gain as she approaches 41 years old, but is primarily concerned about the persistent hunger and lack of satiety.    CURRENT SYMPTOMS:  She reports experiencing extreme fatigue, which she attributes to her previous medication. She describes an increased appetite, stating she is always hungry and cannot stop eating. She also notes difficulty concentrating, though she is unsure if this is a distinct issue or related to her fatigue. She denies experiencing nausea.    ALLERGIES:  She is currently experiencing severe allergies, describing the symptoms as  significantly uncomfortable. She notes it is a high allergy season and expresses a preference for not having to take medication for her allergies if possible.    CURRENT MEDICATIONS:  She is currently taking prednisone and antibiotics for sinus problems, reporting anxiety-like feelings as a side effect of the prednisone. She is also taking antibiotics for a UTI prescribed by Dr. Tran. Rizatriptan was prescribed as a rescue medication for migraines, but she has not taken it yet and inquired about its safety.    MEDICAL HISTORY:  She reports a history of multiple C-sections and a hysterectomy. She experiences severe constipation, which she attributes to adhesions from her previous surgeries. A colonoscopy confirmed the presence of adhesions as the cause of her constipation.    LIFESTYLE:  She is attempting to incorporate exercise into her routine, primarily for self-investment and overall well-being. She is also making efforts to increase her sun exposure. She acknowledges the benefits of these lifestyle changes, including feeling better and increased mobility.    PHARMACY INCIDENT:  She reports a pharmacy error where she was mistakenly given muscle relaxers instead of the   prescribed antibiotics. She experienced unexpected drowsiness, including an incident where she fell asleep on the couch. Upon investigation, it was confirmed that the pharmacy had mixed muscle relaxers with her antibiotic prescription. She denies any serious adverse effects from this incident.    ROS:  General: -fever, -chills, +fatigue, +weight gain, -weight loss  Eyes: -vision changes, -redness, -discharge  ENT: -ear pain, -nasal congestion, -sore throat  Cardiovascular: -chest pain, -palpitations, -lower extremity edema  Respiratory: -cough, -shortness of breath  Gastrointestinal: -abdominal pain, -nausea, -vomiting, -diarrhea, +constipation, -blood in stool  Genitourinary: -dysuria, -hematuria, -frequency  Musculoskeletal: -joint pain, -muscle  pain  Skin: -rash, -lesion  Neurological: -headache, -dizziness, -numbness, -tingling  Psychiatric: +anxiety, -depression, -sleep difficulty         PAST MEDICAL HISTORY:  Past Medical History:   Diagnosis Date    Anxiety     Chronic sinusitis     Epigastric pain     Kidney stones     history    Migraines     Nephrolithiasis     OAB (overactive bladder)        PAST SURGICAL HISTORY:  Past Surgical History:   Procedure Laterality Date    Benign neoplasm buttock skin Right 10/05/2022    CERVICAL CERCLAGE  2013     SECTION      COLONOSCOPY N/A 2024    Procedure: COLONOSCOPY;  Surgeon: Jefry Singh MD;  Location: [facility];  Service: Endoscopy;  Laterality: N/A;    CYSTOSCOPY N/A 2019    Procedure: CYSTOSCOPY;  Surgeon: Yokasta John MD;  Location: [facility];  Service: OB/GYN;  Laterality: N/A;    ESOPHAGOGASTRODUODENOSCOPY N/A 2019    Procedure: EGD (ESOPHAGOGASTRODUODENOSCOPY);  Surgeon: Ferdinand Cooney MD;  Location: [facility];  Service: General;  Laterality: N/A;    HYSTERECTOMY      LAPAROSCOPIC SALPINGECTOMY Bilateral 2019    Procedure: SALPINGECTOMY, LAPAROSCOPIC;  Surgeon: Yokasta John MD;  Location: [facility];  Service: OB/GYN;  Laterality: Bilateral;    LAPAROSCOPIC TOTAL HYSTERECTOMY N/A 2019    Procedure: HYSTERECTOMY, TOTAL, LAPAROSCOPIC;  Surgeon: Yokasta John MD;  Location: [facility];  Service: OB/GYN;  Laterality: N/A;    SOFT TISSUE BIOPSY Right 10/5/2022    Procedure: BIOPSY, SOFT TISSUE, right medial thigh/gluteal area;  Surgeon: Ferdinand Cooney MD;  Location: [facility];  Service: General;  Laterality: Right;    TUBAL LIGATION  2014       SOCIAL HISTORY:  Social History     Socioeconomic History    Marital status:    Tobacco Use    Smoking status: Never    Smokeless tobacco: Never   Substance and Sexual Activity    Alcohol use: Never     Alcohol/week: 0.0 standard drinks of alcohol    Drug use: Never     "Sexual activity: Yes     Partners: Male     Birth control/protection: Other-see comments, See Surgical Hx     Comment: Tubal       FAMILY HISTORY:  Family History   Problem Relation Name Age of Onset    Breast cancer Maternal Grandmother  68    Breast cancer Maternal Aunt  56    Esophageal cancer Maternal Aunt      Cancer Neg Hx      Colon cancer Neg Hx      Diabetes Neg Hx      Eclampsia Neg Hx      Miscarriages / Stillbirths Neg Hx      Hypertension Neg Hx      Ovarian cancer Neg Hx       labor Neg Hx      Stroke Neg Hx         ALLERGIES AND MEDICATIONS: updated and reviewed.  Review of patient's allergies indicates:   Allergen Reactions    Celestone [betamethasone] Palpitations     Current Outpatient Medications   Medication Sig Dispense Refill    amoxicillin-clavulanate 875-125mg (AUGMENTIN) 875-125 mg per tablet Take 1 tablet by mouth every 12 (twelve) hours.      azelastine (ASTELIN) 137 mcg (0.1 %) nasal spray SMARTSIG:Both Nares      benzonatate (TESSALON) 200 MG capsule Take 200 mg by mouth.      cyclobenzaprine (FLEXERIL) 5 MG tablet TAKE 1 TABLET BY MOUTH THREE TIMES A DAY AS NEEDED FOR MUSCLE SPASMS 30 tablet 2    predniSONE (DELTASONE) 20 MG tablet Take 20 mg by mouth every 12 (twelve) hours.      rizatriptan (MAXALT) 10 MG tablet Take one tablet (10 mg) at the onset of headaches; if symptoms persist or return, may repeat dose after 2 hours. maximum dose: 20 mg per 24 hours 9 tablet 5    dextroamphetamine-amphetamine (ADDERALL) 10 mg Tab Take 1 tablet (10 mg total) by mouth once daily. 30 tablet 0    FLUoxetine 10 MG capsule Take 1 capsule (10 mg total) by mouth once daily. 90 capsule 1     No current facility-administered medications for this visit.         PHYSICAL EXAM  Vitals:    10/11/24 0949   BP: 108/68   Pulse: 92   Resp: 15   Temp: 96 °F (35.6 °C)   TempSrc: Temporal   SpO2: 98%   Weight: 56.7 kg (125 lb)   Height: 5' 1" (1.549 m)    Body mass index is 23.62 kg/m².  Weight: 56.7 kg (125 " "lb)   Height: 5' 1" (154.9 cm)     Physical Exam    General: No acute distress. Well-developed. Well-nourished.  Eyes: EOMI. Sclerae anicteric.  HENT: Normocephalic. Atraumatic. Nares patent. Moist oral mucosa.  Ears: Bilateral TMs clear. Bilateral EACs clear.  Cardiovascular: Regular rate. Regular rhythm. No murmurs. No rubs. No gallops. Normal S1, S2.  Respiratory: Normal respiratory effort. Clear to auscultation bilaterally. No rales. No rhonchi. No wheezing.  Abdomen: Soft. Non-tender. Non-distended. Normoactive bowel sounds.  Musculoskeletal: No  obvious deformity.  Extremities: No lower extremity edema.  Neurological: Alert & oriented x3. No slurred speech. Normal gait.  Psychiatric: Normal mood. Normal affect. Good insight. Good judgment.  Skin: Warm. Dry. No rash.         Health Maintenance         Date Due Completion Date    Influenza Vaccine (1) 09/01/2024 11/16/2020    COVID-19 Vaccine (1 - 2024-25 season) Never done ---    Lipid Panel 06/11/2025 6/11/2024    TETANUS VACCINE 03/30/2034 3/30/2024    RSV Vaccine (Age 60+ and Pregnant patients) (1 - 1-dose 75+ series) 08/06/2061 ---              Assessment & Plan    Problem List Items Addressed This Visit          Unprioritized    PMDD (premenstrual dysphoric disorder) - Primary     Other Visit Diagnoses       Intractable chronic migraine without aura and without status migrainosus        Attention deficit hyperactivity disorder (ADHD), combined type                Assessment & Plan    Will restart fluoxetine 10 mg for mood management, as patient reported better tolerance and mood stability on this medication  Recommend trial of low-dose Adderall (5 mg) for concentration issues and potential appetite suppression, as an alternative to previously prescribed Vyvanse due to cost concerns  Considered but decided against Mounjaro (GLP-1 agonist) for weight management, as it would not address energy concerns and may cause GI side effects  Advised against starting " Adderall while patient is on prednisone due to potential anxiety exacerbation  Recommend Astelin nasal spray for allergy symptoms management, as an alternative to oral antihistamines to minimize systemic side effects    ATTENTION DEFICIT HYPERACTIVITY DISORDER (ADHD):  - Educated on Adderall's mechanism of action as a stimulant, potential benefits for concentration and productivity, and its on/off nature without withdrawal effects.  - Started Adderall 5 mg (half of a 10 mg tablet) daily in the morning.  - Do not start while on prednisone.  - Can skip on weekends if desired.  - Follow up in 3 months if continuing Adderall.    WEIGHT MANAGEMENT:  - Explained Mounjaro and Ozempic as being in the same class of medications (GLP-1 agonists) used for weight management, but with potential GI side effects.  Not interested in GLP 1 at this time.    COLON HEALTH:  - Discussed Metamucil's benefits for colon health, including prevention of diverticulosis.  - Shena to take Metamucil daily as a colon health supplement.    DIETARY MANAGEMENT:  - Explained Stevia as a healthier alternative sweetener option, noting potential aftertaste.  - Recommend reducing sugar intake, particularly sweet tea consumption.  - Shena can try using Stevia as an alternative sweetener.    DEPRESSION:  - Started fluoxetine 10 mg daily.  Continue current medication dosage.    ALLERGIES:  - Started Astelin nasal spray for allergy symptoms.    SINUS INFECTION:  - Continued Augmentin for sinus infection.  - Decreased prednisone to half dose if needed for remaining course.    MIGRAINE:  - Continued Rizatriptan as needed for migraine rescue (9 tablets prescribed).    MEDICATIONS/SUPPLEMENTS:  - Shena to continue using probiotic (culturelle) for gut health.    FOLLOW UP:  - Send a message to update on response to new medication regimen.         Follow-up: Follow up in about 3 months (around 1/11/2025).    Marisel Mcdaniels       This note was generated with the  assistance of ambient listening technology. Verbal consent was obtained by the patient and accompanying visitor(s) for the recording of patient appointment to facilitate this note. I attest to having reviewed and edited the generated note for accuracy, though some syntax or spelling errors may persist. Please contact the author of this note for any clarification.      Medication List with Changes/Refills   New Medications    DEXTROAMPHETAMINE-AMPHETAMINE (ADDERALL) 10 MG TAB    Take 1 tablet (10 mg total) by mouth once daily.    FLUOXETINE 10 MG CAPSULE    Take 1 capsule (10 mg total) by mouth once daily.   Current Medications    AMOXICILLIN-CLAVULANATE 875-125MG (AUGMENTIN) 875-125 MG PER TABLET    Take 1 tablet by mouth every 12 (twelve) hours.    AZELASTINE (ASTELIN) 137 MCG (0.1 %) NASAL SPRAY    SMARTSIG:Both Nares    BENZONATATE (TESSALON) 200 MG CAPSULE    Take 200 mg by mouth.    CYCLOBENZAPRINE (FLEXERIL) 5 MG TABLET    TAKE 1 TABLET BY MOUTH THREE TIMES A DAY AS NEEDED FOR MUSCLE SPASMS    PREDNISONE (DELTASONE) 20 MG TABLET    Take 20 mg by mouth every 12 (twelve) hours.    RIZATRIPTAN (MAXALT) 10 MG TABLET    Take one tablet (10 mg) at the onset of headaches; if symptoms persist or return, may repeat dose after 2 hours. maximum dose: 20 mg per 24 hours   Discontinued Medications    LISDEXAMFETAMINE (VYVANSE) 10 MG CAP    Take 1 capsule (10 mg total) by mouth daily as needed (concentration).

## 2024-10-14 ENCOUNTER — PATIENT MESSAGE (OUTPATIENT)
Dept: PAIN MEDICINE | Facility: CLINIC | Age: 39
End: 2024-10-14
Payer: COMMERCIAL

## 2024-10-22 ENCOUNTER — OFFICE VISIT (OUTPATIENT)
Dept: PAIN MEDICINE | Facility: CLINIC | Age: 39
End: 2024-10-22
Payer: COMMERCIAL

## 2024-10-22 ENCOUNTER — PATIENT MESSAGE (OUTPATIENT)
Dept: PRIMARY CARE CLINIC | Facility: CLINIC | Age: 39
End: 2024-10-22
Payer: COMMERCIAL

## 2024-10-22 VITALS
HEART RATE: 87 BPM | WEIGHT: 119.5 LBS | SYSTOLIC BLOOD PRESSURE: 99 MMHG | DIASTOLIC BLOOD PRESSURE: 50 MMHG | BODY MASS INDEX: 22.58 KG/M2

## 2024-10-22 DIAGNOSIS — G44.86 CERVICOGENIC HEADACHE: ICD-10-CM

## 2024-10-22 DIAGNOSIS — M47.812 CERVICAL SPONDYLOSIS: Primary | ICD-10-CM

## 2024-10-22 PROCEDURE — 99999 PR PBB SHADOW E&M-EST. PATIENT-LVL III: CPT | Mod: PBBFAC,,,

## 2024-10-22 PROCEDURE — 99214 OFFICE O/P EST MOD 30 MIN: CPT | Mod: S$GLB,,,

## 2024-10-22 PROCEDURE — 3078F DIAST BP <80 MM HG: CPT | Mod: CPTII,S$GLB,,

## 2024-10-22 PROCEDURE — 3074F SYST BP LT 130 MM HG: CPT | Mod: CPTII,S$GLB,,

## 2024-10-22 PROCEDURE — 1159F MED LIST DOCD IN RCRD: CPT | Mod: CPTII,S$GLB,,

## 2024-10-22 PROCEDURE — 1160F RVW MEDS BY RX/DR IN RCRD: CPT | Mod: CPTII,S$GLB,,

## 2024-10-22 PROCEDURE — 3008F BODY MASS INDEX DOCD: CPT | Mod: CPTII,S$GLB,,

## 2024-10-22 RX ORDER — DEXTROAMPHETAMINE SACCHARATE, AMPHETAMINE ASPARTATE, DEXTROAMPHETAMINE SULFATE AND AMPHETAMINE SULFATE 2.5; 2.5; 2.5; 2.5 MG/1; MG/1; MG/1; MG/1
1 TABLET ORAL DAILY
Qty: 30 TABLET | Refills: 0 | OUTPATIENT
Start: 2024-10-22

## 2024-10-22 NOTE — PROGRESS NOTES
Subjective:     Patient ID: Shena Gomez is a 39 y.o. female    Chief Complaint: Neck Pain      Referred by: No ref. provider found      HPI:    Interval History PA (10/22/2024):  Patient returns to clinic for follow up of chronic left-sided neck pain and headaches.  Patient denies significant changes in the quality or location of pain.  Notes persistent neck pain located in her left upper cervical paraspinal region.  Denies any radiation into her upper back or extremities.  Previously was noting frequent associated headaches however this has improved since previous encounter.  Notes undergoing Botox injections with Neurology which has significantly improved her chronic headaches.  However continues to note significant pain in her left upper cervical paraspinal region.  Previously we did discuss performing left C3, C4, TON medial branch blocks although this was postponed as patient was undergoing Botox.  Patient is interested in proceeding at this time.    Initial Encounter (2/8/24):  Shena Gomez is a 39 y.o. female who presents today with chronic left-sided neck pain/headaches.  No specific inciting events or injuries noted.  Pain is located the left upper cervical paraspinal region.  Has associated headaches affecting the left occipital/parietal regions.  Pain is intermittent.  Has some days where she does not experience pain.  Does avoid certain activities positions.  States she can not sleep on her left side secondary to severe pain.   This pain is described in detail below.    Physical Therapy:  Yes.  Not effective.    Non-pharmacologic Treatment:  Rest helps.         TENS?  No    Pain Medications:         Currently taking:  Gabapentin, Flexeril, NSAIDs    Has tried in the past:  NSAIDs, Tylenol, tizanidine    Has not tried:  Opioids, TCAs, SNRIs, topical creams    Blood thinners:  None    Interventional Therapies:  None    Relevant Surgeries:  None    Affecting sleep?  Yes    Affecting daily  activities? yes    Depressive symptoms? No          SI/HI? No    Work status: Unemployed    Pain Scores:    Best:       0/10  Worst:     10/10  Usually:   8/10  Today:    2/10    Pain Disability Index  Family/Home Responsibilities:: 0  Recreation:: 0  Social Activity:: 0  Occupation:: 0  Sexual Behavior:: 0  Self Care:: 0  Life-Support Activities:: 7  Pain Disability Index (PDI): 7    Review of Systems   Constitutional:  Negative for activity change, appetite change, chills, fatigue, fever and unexpected weight change.   HENT:  Negative for hearing loss.    Eyes:  Negative for visual disturbance.   Respiratory:  Negative for chest tightness and shortness of breath.    Cardiovascular:  Negative for chest pain.   Gastrointestinal:  Negative for abdominal pain, constipation, diarrhea, nausea and vomiting.   Genitourinary:  Negative for difficulty urinating.   Musculoskeletal:  Positive for myalgias, neck pain and neck stiffness. Negative for back pain and gait problem.   Skin:  Negative for rash.   Neurological:  Positive for headaches. Negative for dizziness, weakness, light-headedness and numbness.   Psychiatric/Behavioral:  Positive for sleep disturbance. Negative for hallucinations and suicidal ideas. The patient is not nervous/anxious.        Past Medical History:   Diagnosis Date    Anxiety     Chronic sinusitis     Epigastric pain     Kidney stones     history    Migraines     Nephrolithiasis     OAB (overactive bladder)        Past Surgical History:   Procedure Laterality Date    Benign neoplasm buttock skin Right 10/05/2022    CERVICAL CERCLAGE  2013     SECTION      COLONOSCOPY N/A 2024    Procedure: COLONOSCOPY;  Surgeon: Jefry Singh MD;  Location: [facility] [place] ENDO;  Service: Endoscopy;  Laterality: N/A;    CYSTOSCOPY N/A 2019    Procedure: CYSTOSCOPY;  Surgeon: Yokasta John MD;  Location: [facility] OR;  Service: OB/GYN;  Laterality: N/A;    ESOPHAGOGASTRODUODENOSCOPY N/A  12/23/2019    Procedure: EGD (ESOPHAGOGASTRODUODENOSCOPY);  Surgeon: Ferdinand Cooney MD;  Location: [facility] ENDO;  Service: General;  Laterality: N/A;    HYSTERECTOMY      LAPAROSCOPIC SALPINGECTOMY Bilateral 08/27/2019    Procedure: SALPINGECTOMY, LAPAROSCOPIC;  Surgeon: Yokasta John MD;  Location: [facility] OR;  Service: OB/GYN;  Laterality: Bilateral;    LAPAROSCOPIC TOTAL HYSTERECTOMY N/A 08/27/2019    Procedure: HYSTERECTOMY, TOTAL, LAPAROSCOPIC;  Surgeon: Yokasta John MD;  Location: [facility] OR;  Service: OB/GYN;  Laterality: N/A;    SOFT TISSUE BIOPSY Right 10/5/2022    Procedure: BIOPSY, SOFT TISSUE, right medial thigh/gluteal area;  Surgeon: Ferdinand Cooney MD;  Location: [facility] OR;  Service: General;  Laterality: Right;    TUBAL LIGATION  02/24/2014       Social History     Socioeconomic History    Marital status:    Tobacco Use    Smoking status: Never    Smokeless tobacco: Never   Substance and Sexual Activity    Alcohol use: Never     Alcohol/week: 0.0 standard drinks of alcohol    Drug use: Never    Sexual activity: Yes     Partners: Male     Birth control/protection: Other-see comments, See Surgical Hx     Comment: Tubal       Review of patient's allergies indicates:   Allergen Reactions    Celestone [betamethasone] Palpitations       Current Outpatient Medications on File Prior to Visit   Medication Sig Dispense Refill    azelastine (ASTELIN) 137 mcg (0.1 %) nasal spray SMARTSIG:Both Nares      cyclobenzaprine (FLEXERIL) 5 MG tablet TAKE 1 TABLET BY MOUTH THREE TIMES A DAY AS NEEDED FOR MUSCLE SPASMS 30 tablet 2    dextroamphetamine-amphetamine (ADDERALL) 10 mg Tab Take 1 tablet (10 mg total) by mouth once daily. 30 tablet 0    FLUoxetine 10 MG capsule Take 1 capsule (10 mg total) by mouth once daily. 90 capsule 1    rizatriptan (MAXALT) 10 MG tablet Take one tablet (10 mg) at the onset of headaches; if symptoms persist or return, may repeat dose after 2 hours. maximum  dose: 20 mg per 24 hours 9 tablet 5    amoxicillin-clavulanate 875-125mg (AUGMENTIN) 875-125 mg per tablet Take 1 tablet by mouth every 12 (twelve) hours.      benzonatate (TESSALON) 200 MG capsule Take 200 mg by mouth.      predniSONE (DELTASONE) 20 MG tablet Take 20 mg by mouth every 12 (twelve) hours.       No current facility-administered medications on file prior to visit.       Objective:      BP (!) 99/50 (BP Location: Left arm, Patient Position: Sitting)   Pulse 87   Wt 54.2 kg (119 lb 7.8 oz)   LMP 08/26/2019   BMI 22.58 kg/m²     Exam:  GEN:  Well developed, well nourished.  No acute distress.  Normal pain behavior.  HEENT:  No trauma.  Mucous membranes moist.  Nares patent bilaterally.  PSYCH: Normal affect. Thought content appropriate.  CHEST:  Breathing symmetric.  No audible wheezing.  ABD: Soft, non-distended.  SKIN:  Warm, pink, dry.  No rash on exposed areas.    EXT:  No cyanosis, clubbing, or edema.  No color change or changes in nail or hair growth.  NEURO/MUSCULOSKELETAL:  Fully alert, oriented, and appropriate. Speech normal wale. No cranial nerve deficits.   Gait:  Normal.  5/5 motor strength throughout upper extremities.   C-Spine:  Full ROM with pain on extension.  Positive facet loading on the left.  Negative Spurling's bilaterally.    Positive TTP over left upper cervical paraspinal muscles      Imaging:    Narrative & Impression    EXAMINATION:  MRI CERVICAL SPINE WITHOUT CONTRAST     CLINICAL HISTORY:  cervicogenic headache;.  Cervicogenic headache     TECHNIQUE:  Multiplanar, multisequence MR images of the cervical spine were acquired without the administration of contrast.     COMPARISON:  Cervical spine radiograph dated 07/13/2023     FINDINGS:  Cervical vertebral body heights are maintained.  No infiltrative T1 marrow process.  Cervical cord is normal in signal and caliber.  Visualized brainstem and cerebellum are unremarkable.  Remaining visualized prevertebral and paraspinal  soft tissues demonstrate no acute abnormality.     C2-C3: No significant spinal canal stenosis or neural foraminal impingement.     C3-C4: Modest-mild posterior disc osteophyte without significant spinal canal stenosis or neural foraminal impingement.     C4-C5: Modest-mild posterior disc osteophyte without significant spinal canal stenosis or neural foraminal impingement.     C5-C6: Mild posterior disc osteophyte without significant spinal canal stenosis or neural foraminal impingement.     C6-C7: Modest-mild posterior disc osteophyte without significant spinal canal stenosis or neural foraminal impingement.     C7-T1: No significant posterior disc herniation, spinal canal stenosis, or neural foraminal impingement.     Impression:     Mild cervical spondylosis without significant spinal canal stenosis or neural foraminal encroachment.        Electronically signed by: Emmanuel Soriano  Date:                                            11/06/2023  Time:                                           15:49       Assessment:       Encounter Diagnoses   Name Primary?    Cervical spondylosis Yes    Cervicogenic headache        Plan:       Shena was seen today for neck pain.    Diagnoses and all orders for this visit:    Cervical spondylosis  -     Ambulatory referral/consult to Physical/Occupational Therapy; Future    Cervicogenic headache  -     Ambulatory referral/consult to Physical/Occupational Therapy; Future          Shena Carolina Gomez is a 39 y.o. female with chronic left-sided neck pain and headaches.  Suspicion for left upper cervical facet mediated pain with associated cervicogenic headaches.  Low suspicion for radiculopathy.  Limited if any degeneration of the cervical facet joints, but given patient's age these findings can not rule out pain related to cervical facet joints.    Pertinent imaging studies reviewed by me. Imaging results were discussed with patient.  Schedule for left C3, C4 medial branch and left 3rd  occipital nerve blocks x2 under fluoroscopy.  If diagnostic can proceed with radiofrequency ablation.    Can proceed following up with Neurology for Botox injections.  Referred to physical therapy for dry needling.  Return to clinic after procedure to discuss results.            This note was created by combination of typed  and M-Modal dictation. Transcription and phonetic errors may be present.  If there are any questions, please contact me.

## 2024-10-24 ENCOUNTER — OFFICE VISIT (OUTPATIENT)
Dept: PRIMARY CARE CLINIC | Facility: CLINIC | Age: 39
End: 2024-10-24
Payer: COMMERCIAL

## 2024-10-24 VITALS
HEART RATE: 57 BPM | RESPIRATION RATE: 15 BRPM | BODY MASS INDEX: 22.4 KG/M2 | SYSTOLIC BLOOD PRESSURE: 102 MMHG | OXYGEN SATURATION: 98 % | DIASTOLIC BLOOD PRESSURE: 68 MMHG | TEMPERATURE: 97 F | WEIGHT: 118.63 LBS | HEIGHT: 61 IN

## 2024-10-24 DIAGNOSIS — F32.81 PMDD (PREMENSTRUAL DYSPHORIC DISORDER): Primary | ICD-10-CM

## 2024-10-24 PROCEDURE — 3074F SYST BP LT 130 MM HG: CPT | Mod: CPTII,S$GLB,, | Performed by: NURSE PRACTITIONER

## 2024-10-24 PROCEDURE — 1159F MED LIST DOCD IN RCRD: CPT | Mod: CPTII,S$GLB,, | Performed by: NURSE PRACTITIONER

## 2024-10-24 PROCEDURE — 99214 OFFICE O/P EST MOD 30 MIN: CPT | Mod: S$GLB,,, | Performed by: NURSE PRACTITIONER

## 2024-10-24 PROCEDURE — 99999 PR PBB SHADOW E&M-EST. PATIENT-LVL IV: CPT | Mod: PBBFAC,,, | Performed by: NURSE PRACTITIONER

## 2024-10-24 PROCEDURE — 3078F DIAST BP <80 MM HG: CPT | Mod: CPTII,S$GLB,, | Performed by: NURSE PRACTITIONER

## 2024-10-24 PROCEDURE — 3008F BODY MASS INDEX DOCD: CPT | Mod: CPTII,S$GLB,, | Performed by: NURSE PRACTITIONER

## 2024-10-24 RX ORDER — DEXTROAMPHETAMINE SACCHARATE, AMPHETAMINE ASPARTATE, DEXTROAMPHETAMINE SULFATE AND AMPHETAMINE SULFATE 2.5; 2.5; 2.5; 2.5 MG/1; MG/1; MG/1; MG/1
1 TABLET ORAL DAILY
Qty: 30 TABLET | Refills: 0 | Status: SHIPPED | OUTPATIENT
Start: 2024-11-23

## 2024-10-24 RX ORDER — DEXTROAMPHETAMINE SACCHARATE, AMPHETAMINE ASPARTATE, DEXTROAMPHETAMINE SULFATE AND AMPHETAMINE SULFATE 2.5; 2.5; 2.5; 2.5 MG/1; MG/1; MG/1; MG/1
1 TABLET ORAL DAILY
Qty: 30 TABLET | Refills: 0 | Status: SHIPPED | OUTPATIENT
Start: 2024-10-24

## 2024-10-24 RX ORDER — DEXTROAMPHETAMINE SACCHARATE, AMPHETAMINE ASPARTATE, DEXTROAMPHETAMINE SULFATE AND AMPHETAMINE SULFATE 2.5; 2.5; 2.5; 2.5 MG/1; MG/1; MG/1; MG/1
1 TABLET ORAL DAILY
Qty: 30 TABLET | Refills: 0 | Status: SHIPPED | OUTPATIENT
Start: 2024-12-23

## 2024-10-24 NOTE — PROGRESS NOTES
Assessment:       1. PMDD (premenstrual dysphoric disorder)         Plan:       PMDD (premenstrual dysphoric disorder)  -     dextroamphetamine-amphetamine (ADDERALL) 10 mg Tab; Take 1 tablet (10 mg total) by mouth once daily.  Dispense: 30 tablet; Refill: 0  -     dextroamphetamine-amphetamine (ADDERALL) 10 mg Tab; Take 1 tablet (10 mg total) by mouth once daily.  Dispense: 30 tablet; Refill: 0  -     dextroamphetamine-amphetamine (ADDERALL) 10 mg Tab; Take 1 tablet (10 mg total) by mouth once daily.  Dispense: 30 tablet; Refill: 0      Assessment & Plan    Assessed patient's response to fluoxetine 10 mg daily and Adderall 5 mg twice daily  Noted significant improvement in patient's sleep, focus, and binge eating behaviors  Evaluated potential side effects, including impact on heart rate and blood pressure  Considered long-term management strategy for Adderall to prevent tolerance development  Reviewed patient's concern about recent neck cyst, determining it was unrelated to current medications    ATTENTION DEFICIT HYPERACTIVITY DISORDER (ADHD):  - Explained potential for decreased effectiveness of Adderall over time.  - Discussed stimulant effects on heart rate and blood pressure.  - Clarified that Adderall is not an immunosuppressant.  - Informed about the possibility of feeling irritable when Adderall wears off.  - Continued Adderall 5 mg twice daily, taken at 7:00 AM and 1:00 PM.  - Provided 3 post-dated prescriptions for Adderall, to be filled monthly on 10/24, 11/23, and 12/23.  -  Adderall prescriptions from pharmacy monthly as post-dated.    DEPRESSION:  - Continued fluoxetine 10 mg daily at night.    SLEEP MANAGEMENT:  - Shena to continue current sleep schedule, going to bed around 8:00 PM.    STRESS MANAGEMENT:  - Recommend planning dinners on Sundays to reduce daily stress.    FOLLOW UP:  - Follow up in January.       Medication List with Changes/Refills   New Medications     DEXTROAMPHETAMINE-AMPHETAMINE (ADDERALL) 10 MG TAB    Take 1 tablet (10 mg total) by mouth once daily.    DEXTROAMPHETAMINE-AMPHETAMINE (ADDERALL) 10 MG TAB    Take 1 tablet (10 mg total) by mouth once daily.   Current Medications    AMOXICILLIN-CLAVULANATE 875-125MG (AUGMENTIN) 875-125 MG PER TABLET    Take 1 tablet by mouth every 12 (twelve) hours.    AZELASTINE (ASTELIN) 137 MCG (0.1 %) NASAL SPRAY    SMARTSIG:Both Nares    BENZONATATE (TESSALON) 200 MG CAPSULE    Take 200 mg by mouth.    CYCLOBENZAPRINE (FLEXERIL) 5 MG TABLET    TAKE 1 TABLET BY MOUTH THREE TIMES A DAY AS NEEDED FOR MUSCLE SPASMS    FLUOXETINE 10 MG CAPSULE    Take 1 capsule (10 mg total) by mouth once daily.    PREDNISONE (DELTASONE) 20 MG TABLET    Take 20 mg by mouth every 12 (twelve) hours.    RIZATRIPTAN (MAXALT) 10 MG TABLET    Take one tablet (10 mg) at the onset of headaches; if symptoms persist or return, may repeat dose after 2 hours. maximum dose: 20 mg per 24 hours   Changed and/or Refilled Medications    Modified Medication Previous Medication    DEXTROAMPHETAMINE-AMPHETAMINE (ADDERALL) 10 MG TAB dextroamphetamine-amphetamine (ADDERALL) 10 mg Tab       Take 1 tablet (10 mg total) by mouth once daily.    Take 1 tablet (10 mg total) by mouth once daily.         Subjective:    Patient ID: Shena Gomez is a 39 y.o. female.  Chief Complaint: med f/u    HPI  History of Present Illness    CHIEF COMPLAINT:  Shena presents today for follow-up on medication management.    PSYCHIATRIC MEDICATIONS:  She reports positive effects from Fluoxetine (Prozac) 10mg daily, noting improved focus on household tasks. She denies significant side effects besides initial tiredness. She is currently taking Adderall 10mg daily, split into two 5mg doses, and reports experiencing a crash around 6:00-6:30 PM with her current dosing schedule.    ADHD - Patient with history of ADHD has been on current medication regimen for 1month. Compliant  with adderall 5  "mg BID without noted side effects.  Reports overall improvement in concentration and focus on current medication regimen.  Good completion of tasks.    Denies chest pain or palpitations.  Weight described as stable.  Sleep described as good.  Presents to clinic with blood pressure and heart rate within normal limits.   checked.  Filling routinely.      MOOD AND ANXIETY:  She reports improvement in mood and anxiety, expressing happiness and satisfaction with recent progress. She demonstrates insight into her condition and denies current mood disturbances or significant anxiety symptoms. She mentions feeling apprehensive about certain treatments in the past due to concerns about potential abuse.    SLEEP:  She reports improved sleep patterns since starting medication. She typically goes to bed around 8 PM and denies difficulty falling asleep. She notes feeling slightly sleepy in the evening but no longer experiences daytime napping, which she previously did frequently. She expresses satisfaction with her current sleep schedule and daytime alertness.    WEIGHT MANAGEMENT:  She reports significant reduction in binge eating behaviors and notes weight loss from 125 lbs at a previous visit to approximately 118 lbs currently.    DERMATOLOGICAL CONCERN:  She reports a large neck cyst that was drained by her dermatologist. She started antibiotics the day before the appointment and has surgery scheduled for the cyst in two weeks. She denies signs of infection at the cyst site.      ROS:  Constitutional: +weight loss, +fatigue  Integumentary: -skin lesion  Psychiatric: -anxiety, -sleep difficulty       Review of Systems    Objective:      Vitals:    10/24/24 1004   BP: 102/68   BP Location: Right arm   Patient Position: Sitting   Pulse: (!) 57   Resp: 15   Temp: 97 °F (36.1 °C)   TempSrc: Temporal   SpO2: 98%   Weight: 53.8 kg (118 lb 9.7 oz)   Height: 5' 1" (1.549 m)     BP Readings from Last 5 Encounters:   10/24/24 102/68 "   10/22/24 (!) 99/50   10/11/24 108/68   10/02/24 109/76   09/25/24 117/81     Wt Readings from Last 5 Encounters:   10/24/24 53.8 kg (118 lb 9.7 oz)   10/22/24 54.2 kg (119 lb 7.8 oz)   10/11/24 56.7 kg (125 lb)   10/02/24 55.3 kg (121 lb 14.6 oz)   09/25/24 55 kg (121 lb 4.1 oz)     Physical Exam  Physical Exam    Vitals: Weight: 118 lbs. Heart rate: 57 bpm.  General: No acute distress. Well-developed. Well-nourished.  Eyes: EOMI. Sclerae anicteric.  HENT: Normocephalic. Atraumatic. Nares patent. Moist oral mucosa.  Ears: Bilateral TMs clear. Bilateral EACs clear.  Cardiovascular: Regular rate. Regular rhythm. No murmurs. No rubs. No gallops. Normal S1, S2.  Respiratory: Normal respiratory effort. Clear to auscultation bilaterally. No rales. No rhonchi. No wheezing.  Abdomen: Soft. Non-tender. Non-distended. Normoactive bowel sounds.  Musculoskeletal: No  obvious deformity.  Extremities: No lower extremity edema.  Neurological: Alert & oriented x3. No slurred speech. Normal gait.  Psychiatric: Normal mood. Normal affect. Good insight. Good judgment.  Skin: Warm. Dry. No rash. Healed cyst to left lateral neck.         Lab Results   Component Value Date    WBC 7.53 06/11/2024    HGB 15.8 06/11/2024    HCT 45.8 06/11/2024     06/11/2024    CHOL 197 06/11/2024    TRIG 90 06/11/2024    HDL 60 06/11/2024    ALT 18 06/11/2024    AST 24 06/11/2024     06/11/2024    K 3.9 06/11/2024     06/11/2024    CREATININE 0.7 06/11/2024    BUN 11 06/11/2024    CO2 22 (L) 06/11/2024    TSH 1.556 06/11/2024    HGBA1C 4.7 07/25/2022      This note was generated with the assistance of ambient listening technology. Verbal consent was obtained by the patient and accompanying visitor(s) for the recording of patient appointment to facilitate this note. I attest to having reviewed and edited the generated note for accuracy, though some syntax or spelling errors may persist. Please contact the author of this note for any  clarification.

## 2024-10-30 PROBLEM — M62.9 MUSCULOSKELETAL DISORDER INVOLVING UPPER TRAPEZIUS MUSCLE: Status: ACTIVE | Noted: 2024-10-30

## 2024-10-30 PROBLEM — M47.812 CERVICAL SPONDYLOSIS: Status: ACTIVE | Noted: 2024-10-30

## 2024-10-30 PROBLEM — R29.898 DECREASED ROM OF NECK: Status: ACTIVE | Noted: 2024-10-30

## 2024-11-07 ENCOUNTER — PATIENT MESSAGE (OUTPATIENT)
Dept: OBSTETRICS AND GYNECOLOGY | Facility: CLINIC | Age: 39
End: 2024-11-07
Payer: COMMERCIAL

## 2024-11-08 ENCOUNTER — PATIENT MESSAGE (OUTPATIENT)
Dept: OBSTETRICS AND GYNECOLOGY | Facility: CLINIC | Age: 39
End: 2024-11-08
Payer: COMMERCIAL

## 2024-11-08 NOTE — TELEPHONE ENCOUNTER
Spoke with pt added pt on schedule for Monday unable to come in today. Pt states the lump is pretty big exterior of vagina towards the bottom. Did you want her to apply warm compresses through the weekend? Or send something in for her?

## 2024-11-11 ENCOUNTER — PATIENT MESSAGE (OUTPATIENT)
Dept: OBSTETRICS AND GYNECOLOGY | Facility: CLINIC | Age: 39
End: 2024-11-11

## 2024-11-11 ENCOUNTER — OFFICE VISIT (OUTPATIENT)
Dept: OBSTETRICS AND GYNECOLOGY | Facility: CLINIC | Age: 39
End: 2024-11-11
Payer: COMMERCIAL

## 2024-11-11 VITALS
SYSTOLIC BLOOD PRESSURE: 114 MMHG | WEIGHT: 117.5 LBS | BODY MASS INDEX: 22.2 KG/M2 | HEART RATE: 92 BPM | DIASTOLIC BLOOD PRESSURE: 73 MMHG

## 2024-11-11 DIAGNOSIS — N76.4 VULVAR BOIL: Primary | ICD-10-CM

## 2024-11-11 PROCEDURE — 1160F RVW MEDS BY RX/DR IN RCRD: CPT | Mod: CPTII,S$GLB,, | Performed by: STUDENT IN AN ORGANIZED HEALTH CARE EDUCATION/TRAINING PROGRAM

## 2024-11-11 PROCEDURE — 3078F DIAST BP <80 MM HG: CPT | Mod: CPTII,S$GLB,, | Performed by: STUDENT IN AN ORGANIZED HEALTH CARE EDUCATION/TRAINING PROGRAM

## 2024-11-11 PROCEDURE — 3074F SYST BP LT 130 MM HG: CPT | Mod: CPTII,S$GLB,, | Performed by: STUDENT IN AN ORGANIZED HEALTH CARE EDUCATION/TRAINING PROGRAM

## 2024-11-11 PROCEDURE — 3008F BODY MASS INDEX DOCD: CPT | Mod: CPTII,S$GLB,, | Performed by: STUDENT IN AN ORGANIZED HEALTH CARE EDUCATION/TRAINING PROGRAM

## 2024-11-11 PROCEDURE — 99213 OFFICE O/P EST LOW 20 MIN: CPT | Mod: S$GLB,,, | Performed by: STUDENT IN AN ORGANIZED HEALTH CARE EDUCATION/TRAINING PROGRAM

## 2024-11-11 PROCEDURE — 1159F MED LIST DOCD IN RCRD: CPT | Mod: CPTII,S$GLB,, | Performed by: STUDENT IN AN ORGANIZED HEALTH CARE EDUCATION/TRAINING PROGRAM

## 2024-11-11 PROCEDURE — 99999 PR PBB SHADOW E&M-EST. PATIENT-LVL III: CPT | Mod: PBBFAC,,, | Performed by: STUDENT IN AN ORGANIZED HEALTH CARE EDUCATION/TRAINING PROGRAM

## 2024-11-11 RX ORDER — MUPIROCIN 20 MG/G
OINTMENT TOPICAL 2 TIMES DAILY
Qty: 15 G | Refills: 1 | Status: SHIPPED | OUTPATIENT
Start: 2024-11-11 | End: 2024-11-18

## 2024-11-11 RX ORDER — IMIQUIMOD 12.5 MG/.25G
CREAM TOPICAL
COMMUNITY
Start: 2024-10-29

## 2024-11-11 NOTE — PROGRESS NOTES
HPI:  39 y.o.  here for vulvar cyst/boil. Started at the end of last week (today is Monday).  Over the weekend she did BID sitz baths as instructed and it has gotten smaller, thinks it may have drained. It is also not as painful/tender.  She says she had infected cyst/hair follicle on her back at dermatology as well. Wondering why she is prone to this. She has not groomed pubic hair in awhile so that is not contributing.     OB History    Para Term  AB Living   5 4 3 1 1 3   SAB IAB Ectopic Multiple Live Births   1       1      # Outcome Date GA Lbr Reginald/2nd Weight Sex Type Anes PTL Lv   5 Term 14 39w1d  3.195 kg (7 lb 0.7 oz) F CS-LTranv Spinal N GUI   4  2012 22w0d  0.709 kg (1 lb 9 oz) M       3 Term 09/10/08 38w0d  3.629 kg (8 lb) M CS-LTranv EPI     2 Term 05 42w0d  3.345 kg (7 lb 6 oz) M CS-LTranv EPI     1 SAB                 PE:   Physical Exam  Constitutional:       Appearance: Normal appearance.     Genitourinary:    Vulva normal.   The external female genitalia was normal.   Genitalia hair distrobution normal .     Labial bartholins normal.There is no rash, tenderness or lesion on the right labia. There is no rash, tenderness or lesion on the left labia. There is no rash, tenderness, lesion, skin changes and Bartholin's cyst on the right vulva.There is lesion (Subcentimeter raised tender area. No fluctuance. Does not come to head. Not draining. Not able to express any purulent material. No surrounding redness, warmth, or tenderness.) on the left vulva.There is no rash, tenderness, skin changes and Bartholin's cyst on the left vulva.      HENT:      Head: Normocephalic and atraumatic.   Eyes:      Extraocular Movements: Extraocular movements intact.      Conjunctiva/sclera: Conjunctivae normal.   Pulmonary:      Effort: Pulmonary effort is normal. No respiratory distress.   Musculoskeletal:      Cervical back: Normal range of motion.      Right lower leg: No edema.       Left lower leg: No edema.   Neurological:      General: No focal deficit present.      Mental Status: She is alert and oriented to person, place, and time. Mental status is at baseline.   Skin:     General: Skin is warm and dry.   Psychiatric:         Mood and Affect: Mood normal.         Behavior: Behavior normal.           Diagnosis:  1. Vulvar boil        Plan:     Sehna was seen today for cyst.    Diagnoses and all orders for this visit:    Vulvar boil    Other orders  -     mupirocin (BACTROBAN) 2 % ointment; by Nasal route 2 (two) times daily. for 7 days        - folliculitis/vulvar boil   - improving after draining over the weekend  - continue BID warm compress/sitz bath  - given prior episodes elsewhere on body, we discussed she is probably colonized with a staph or other bacteria that can be offending organism  - rx mupirocin for intranasal use, given hibiclens in hopes of decolonization       RTC for WWE or sooner ISELA Yeung MD  Obstetrics & Gynecology   [facility]

## 2024-11-25 ENCOUNTER — PATIENT MESSAGE (OUTPATIENT)
Dept: PRIMARY CARE CLINIC | Facility: CLINIC | Age: 39
End: 2024-11-25
Payer: COMMERCIAL

## 2024-11-25 ENCOUNTER — TELEPHONE (OUTPATIENT)
Dept: DERMATOLOGY | Facility: CLINIC | Age: 39
End: 2024-11-25
Payer: COMMERCIAL

## 2024-11-25 DIAGNOSIS — L98.9 SKIN LESION: Primary | ICD-10-CM

## 2024-12-10 ENCOUNTER — PATIENT MESSAGE (OUTPATIENT)
Dept: NEUROLOGY | Facility: CLINIC | Age: 39
End: 2024-12-10
Payer: COMMERCIAL

## 2024-12-10 ENCOUNTER — TELEPHONE (OUTPATIENT)
Dept: NEUROLOGY | Facility: CLINIC | Age: 39
End: 2024-12-10
Payer: COMMERCIAL

## 2024-12-11 ENCOUNTER — PROCEDURE VISIT (OUTPATIENT)
Dept: NEUROLOGY | Facility: CLINIC | Age: 39
End: 2024-12-11
Payer: COMMERCIAL

## 2024-12-11 DIAGNOSIS — G24.3 CERVICAL DYSTONIA: ICD-10-CM

## 2024-12-11 DIAGNOSIS — M54.2 MYOFASCIAL NECK PAIN: ICD-10-CM

## 2024-12-11 DIAGNOSIS — G43.719 INTRACTABLE CHRONIC MIGRAINE WITHOUT AURA AND WITHOUT STATUS MIGRAINOSUS: Primary | ICD-10-CM

## 2024-12-11 RX ORDER — CELECOXIB 100 MG/1
100 CAPSULE ORAL 2 TIMES DAILY
Qty: 60 CAPSULE | Refills: 1 | Status: SHIPPED | OUTPATIENT
Start: 2024-12-11

## 2024-12-11 NOTE — PROCEDURES
BOTOX was performed as an indicated therapy for intractable chronic migraine headaches given that the patient failed __ prophylactic medications    Botulinum Toxin Injection Procedure   Pre-operative diagnosis: Chronic migraine   Post-operative diagnosis: Same   Procedure: Chemical neurolysis   After risks and benefits were explained including bleeding, infection, worsening of pain, damage to the areas being injected, weakness of muscles, loss of muscle control, dysphagia if injecting the head or neck, facial droop if injecting the facial area, painful injection, allergic or other reaction to the medications being injected, and the failure of the procedure to help the problem, a signed consent was obtained.   The patient was placed in a comfortable area and the sites to be treated were identified.The area to be treated was prepped three times with alcohol and the alcohol allowed to dry. Next, a 30 gauge needle was used to inject the medication in the area to be treated.   Area(s) injected:   Total Botox used: 185 Units   Botox wastage: 15 Units   Injection sites:    muscle bilaterally ( a total of 10 units divided into 2 sites)   Procerus muscle (5 units)   Frontalis muscle bilaterally (a total of 20 units divided into 4 sites)   Temporalis muscle bilaterally (a total of 40 units divided into 8 sites)   Occipitalis muscle bilaterally (a total of 30 units divided into 6 sites)   Cervical paraspinal muscles (a total of 20 units divided into 4 sites)   Trapezius muscle bilaterally (a total of 60 units divided into 6 sites)   Complications: none         RTC for the next Botox injection: 3 months     Interval hx and management plan: she reports average of 5 headaches per month which is an improvement to prior Botox treatment however she continues to have intense neck pain on a constant basis despite PT and dry needling. She has visible and palpable muscle contraction, trigger points,  and tenderness over the  trapezius and paraspinals (L>R). We injected more units in the trapezius muscles. Advised her to take her Rizatriptan at the onset of the pain, reviewed potential side effects. We will also do a trial of Celebrex as an anti-inflammatory to address the neck pain.       1. Intractable chronic migraine without aura and without status migrainosus    2. Cervical dystonia    3. Myofascial neck pain          Kristine Hobbs MD  12/11/2024

## 2024-12-15 ENCOUNTER — PATIENT MESSAGE (OUTPATIENT)
Dept: ORTHOPEDICS | Facility: CLINIC | Age: 39
End: 2024-12-15
Payer: COMMERCIAL

## 2025-01-09 ENCOUNTER — PATIENT MESSAGE (OUTPATIENT)
Dept: PRIMARY CARE CLINIC | Facility: CLINIC | Age: 40
End: 2025-01-09
Payer: COMMERCIAL

## 2025-01-13 ENCOUNTER — PATIENT MESSAGE (OUTPATIENT)
Dept: ORTHOPEDICS | Facility: CLINIC | Age: 40
End: 2025-01-13
Payer: COMMERCIAL

## 2025-01-13 ENCOUNTER — OFFICE VISIT (OUTPATIENT)
Dept: PRIMARY CARE CLINIC | Facility: CLINIC | Age: 40
End: 2025-01-13
Payer: COMMERCIAL

## 2025-01-13 VITALS
WEIGHT: 109.81 LBS | HEART RATE: 96 BPM | HEIGHT: 61 IN | SYSTOLIC BLOOD PRESSURE: 106 MMHG | DIASTOLIC BLOOD PRESSURE: 72 MMHG | RESPIRATION RATE: 15 BRPM | BODY MASS INDEX: 20.73 KG/M2 | OXYGEN SATURATION: 98 % | TEMPERATURE: 97 F

## 2025-01-13 DIAGNOSIS — F90.2 ATTENTION DEFICIT HYPERACTIVITY DISORDER (ADHD), COMBINED TYPE: Primary | ICD-10-CM

## 2025-01-13 DIAGNOSIS — F41.9 ANXIETY: ICD-10-CM

## 2025-01-13 DIAGNOSIS — F32.81 PMDD (PREMENSTRUAL DYSPHORIC DISORDER): ICD-10-CM

## 2025-01-13 PROCEDURE — 99999 PR PBB SHADOW E&M-EST. PATIENT-LVL III: CPT | Mod: PBBFAC,,, | Performed by: NURSE PRACTITIONER

## 2025-01-13 PROCEDURE — 3008F BODY MASS INDEX DOCD: CPT | Mod: CPTII,S$GLB,, | Performed by: NURSE PRACTITIONER

## 2025-01-13 PROCEDURE — 1159F MED LIST DOCD IN RCRD: CPT | Mod: CPTII,S$GLB,, | Performed by: NURSE PRACTITIONER

## 2025-01-13 PROCEDURE — 3074F SYST BP LT 130 MM HG: CPT | Mod: CPTII,S$GLB,, | Performed by: NURSE PRACTITIONER

## 2025-01-13 PROCEDURE — 3078F DIAST BP <80 MM HG: CPT | Mod: CPTII,S$GLB,, | Performed by: NURSE PRACTITIONER

## 2025-01-13 PROCEDURE — 99214 OFFICE O/P EST MOD 30 MIN: CPT | Mod: S$GLB,,, | Performed by: NURSE PRACTITIONER

## 2025-01-13 RX ORDER — DEXTROAMPHETAMINE SACCHARATE, AMPHETAMINE ASPARTATE, DEXTROAMPHETAMINE SULFATE AND AMPHETAMINE SULFATE 2.5; 2.5; 2.5; 2.5 MG/1; MG/1; MG/1; MG/1
1 TABLET ORAL 2 TIMES DAILY
Qty: 60 TABLET | Refills: 0 | Status: SHIPPED | OUTPATIENT
Start: 2025-01-13
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
## 2025-01-31 ENCOUNTER — PATIENT MESSAGE (OUTPATIENT)
Dept: PRIMARY CARE CLINIC | Facility: CLINIC | Age: 40
End: 2025-01-31
Payer: COMMERCIAL

## 2025-02-11 ENCOUNTER — PATIENT MESSAGE (OUTPATIENT)
Dept: PRIMARY CARE CLINIC | Facility: CLINIC | Age: 40
End: 2025-02-11
Payer: COMMERCIAL

## 2025-02-11 DIAGNOSIS — F32.81 PMDD (PREMENSTRUAL DYSPHORIC DISORDER): Primary | ICD-10-CM

## 2025-02-12 ENCOUNTER — PATIENT MESSAGE (OUTPATIENT)
Dept: NEUROLOGY | Facility: CLINIC | Age: 40
End: 2025-02-12
Payer: COMMERCIAL

## 2025-03-05 ENCOUNTER — PROCEDURE VISIT (OUTPATIENT)
Dept: NEUROLOGY | Facility: CLINIC | Age: 40
End: 2025-03-05
Payer: COMMERCIAL

## 2025-03-05 DIAGNOSIS — G44.86 CERVICOGENIC HEADACHE: ICD-10-CM

## 2025-03-05 DIAGNOSIS — G43.719 INTRACTABLE CHRONIC MIGRAINE WITHOUT AURA AND WITHOUT STATUS MIGRAINOSUS: Primary | ICD-10-CM

## 2025-03-05 PROCEDURE — 64615 CHEMODENERV MUSC MIGRAINE: CPT | Mod: S$GLB,,, | Performed by: STUDENT IN AN ORGANIZED HEALTH CARE EDUCATION/TRAINING PROGRAM

## 2025-03-05 PROCEDURE — 99499 UNLISTED E&M SERVICE: CPT | Mod: S$GLB,,, | Performed by: STUDENT IN AN ORGANIZED HEALTH CARE EDUCATION/TRAINING PROGRAM

## 2025-03-05 NOTE — PROCEDURES
BOTOX was performed as an indicated therapy for intractable chronic migraine headaches given that the patient failed __ prophylactic medications    Botulinum Toxin Injection Procedure   Pre-operative diagnosis: Chronic migraine   Post-operative diagnosis: Same   Procedure: Chemical neurolysis   After risks and benefits were explained including bleeding, infection, worsening of pain, damage to the areas being injected, weakness of muscles, loss of muscle control, dysphagia if injecting the head or neck, facial droop if injecting the facial area, painful injection, allergic or other reaction to the medications being injected, and the failure of the procedure to help the problem, a signed consent was obtained.   The patient was placed in a comfortable area and the sites to be treated were identified.The area to be treated was prepped three times with alcohol and the alcohol allowed to dry. Next, a 30 gauge needle was used to inject the medication in the area to be treated.   Area(s) injected:   Total Botox used: 185 Units   Botox wastage: 15 Units   Injection sites:    muscle bilaterally ( a total of 10 units divided into 2 sites)   Procerus muscle (5 units)   Frontalis muscle bilaterally (a total of 20 units divided into 4 sites)   Temporalis muscle bilaterally (a total of 40 units divided into 8 sites)   Occipitalis muscle bilaterally (a total of 30 units divided into 6 sites)   Cervical paraspinal muscles (a total of 20 units divided into 4 sites)   Trapezius muscle bilaterally (a total of 60 units divided into 6 sites)   Complications: none         RTC for the next Botox injection: 3 months       Interval history (03/05/2025):  She reports x5 headaches per month, hasn't tried Maxalt.  Dry needling made the neck pain worse.              Kristine Hobbs MD  03/05/2025

## 2025-03-19 ENCOUNTER — OFFICE VISIT (OUTPATIENT)
Dept: PSYCHOLOGY | Facility: CLINIC | Age: 40
End: 2025-03-19
Payer: COMMERCIAL

## 2025-03-19 ENCOUNTER — PATIENT MESSAGE (OUTPATIENT)
Dept: PSYCHOLOGY | Facility: CLINIC | Age: 40
End: 2025-03-19
Payer: COMMERCIAL

## 2025-03-19 VITALS
SYSTOLIC BLOOD PRESSURE: 108 MMHG | DIASTOLIC BLOOD PRESSURE: 80 MMHG | TEMPERATURE: 98 F | HEART RATE: 85 BPM | OXYGEN SATURATION: 100 %

## 2025-03-19 DIAGNOSIS — F32.81 PMDD (PREMENSTRUAL DYSPHORIC DISORDER): ICD-10-CM

## 2025-03-19 DIAGNOSIS — F41.1 GENERALIZED ANXIETY DISORDER: Primary | ICD-10-CM

## 2025-03-19 DIAGNOSIS — G47.00 INSOMNIA, UNSPECIFIED TYPE: ICD-10-CM

## 2025-03-19 PROCEDURE — 3079F DIAST BP 80-89 MM HG: CPT | Mod: CPTII,S$GLB,, | Performed by: NURSE PRACTITIONER

## 2025-03-19 PROCEDURE — 99999 PR PBB SHADOW E&M-EST. PATIENT-LVL III: CPT | Mod: PBBFAC,,, | Performed by: NURSE PRACTITIONER

## 2025-03-19 PROCEDURE — 99205 OFFICE O/P NEW HI 60 MIN: CPT | Mod: S$GLB,,, | Performed by: NURSE PRACTITIONER

## 2025-03-19 PROCEDURE — 1159F MED LIST DOCD IN RCRD: CPT | Mod: CPTII,S$GLB,, | Performed by: NURSE PRACTITIONER

## 2025-03-19 PROCEDURE — G2211 COMPLEX E/M VISIT ADD ON: HCPCS | Mod: S$GLB,,, | Performed by: NURSE PRACTITIONER

## 2025-03-19 PROCEDURE — 3074F SYST BP LT 130 MM HG: CPT | Mod: CPTII,S$GLB,, | Performed by: NURSE PRACTITIONER

## 2025-03-19 RX ORDER — SERTRALINE HYDROCHLORIDE 25 MG/1
25 TABLET, FILM COATED ORAL DAILY
Qty: 30 TABLET | Refills: 1 | Status: SHIPPED | OUTPATIENT
Start: 2025-03-19 | End: 2025-05-18
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
## 2025-04-14 ENCOUNTER — OFFICE VISIT (OUTPATIENT)
Dept: OBSTETRICS AND GYNECOLOGY | Facility: CLINIC | Age: 40
End: 2025-04-14
Payer: COMMERCIAL

## 2025-04-14 ENCOUNTER — PATIENT MESSAGE (OUTPATIENT)
Dept: OBSTETRICS AND GYNECOLOGY | Facility: CLINIC | Age: 40
End: 2025-04-14

## 2025-04-14 VITALS
SYSTOLIC BLOOD PRESSURE: 103 MMHG | HEART RATE: 79 BPM | WEIGHT: 111.75 LBS | BODY MASS INDEX: 21.94 KG/M2 | DIASTOLIC BLOOD PRESSURE: 71 MMHG | HEIGHT: 60 IN

## 2025-04-14 DIAGNOSIS — N89.8 VAGINAL DRYNESS: ICD-10-CM

## 2025-04-14 DIAGNOSIS — Z01.419 ROUTINE GYNECOLOGICAL EXAMINATION: Primary | ICD-10-CM

## 2025-04-14 DIAGNOSIS — R45.86 MOOD CHANGES: ICD-10-CM

## 2025-04-14 DIAGNOSIS — N76.0 ACUTE VAGINITIS: ICD-10-CM

## 2025-04-14 DIAGNOSIS — R23.2 HOT FLASHES: ICD-10-CM

## 2025-04-14 PROCEDURE — 81515 NFCT DS BV&VAGINITIS DNA ALG: CPT | Performed by: STUDENT IN AN ORGANIZED HEALTH CARE EDUCATION/TRAINING PROGRAM

## 2025-04-14 PROCEDURE — 99999 PR PBB SHADOW E&M-EST. PATIENT-LVL III: CPT | Mod: PBBFAC,,, | Performed by: STUDENT IN AN ORGANIZED HEALTH CARE EDUCATION/TRAINING PROGRAM

## 2025-04-14 RX ORDER — FLUCONAZOLE 150 MG/1
150 TABLET ORAL
Qty: 3 TABLET | Refills: 0 | Status: SHIPPED | OUTPATIENT
Start: 2025-04-14 | End: 2025-04-21

## 2025-04-14 RX ORDER — ESTRADIOL 1 MG/1
2 TABLET ORAL DAILY
Qty: 60 TABLET | Refills: 11 | Status: SHIPPED | OUTPATIENT
Start: 2025-04-14 | End: 2026-04-14

## 2025-04-14 RX ORDER — ESTRADIOL 0.1 MG/G
CREAM VAGINAL
Qty: 42.5 G | Refills: 1 | Status: SHIPPED | OUTPATIENT
Start: 2025-04-14

## 2025-04-14 NOTE — PROGRESS NOTES
ROUTINE ANNUAL GYN EXAM:     HPI: Pt is a 39 y.o.  female who presents for routine annual exam.   Hysterectomy 2019 for AUB-L/AUB-A    Concerns brought up today:  Still struggling with mood, fatigue, weight gain  Possibly having hot flashes/night sweats  Vaginal cuts and vaginal pain with intercourse -- feels dry  Using OTC vaginal moisturizer, not helping much  Seeing psychiatry, may switch her SSRI    Cervical cancer screening: not indicated   History abnormal: no   Hysterectomy pathology reviewed, benign cervix    Breast cancer screening: n/a, due age 41   Colon cancer screening: n/a, due age 46  Bone density: n/a, due age 66    Family history breast cancer: KENDELL SAMSON  Family history ovarian cancer: no  Family history colon cancer: no      OB History    Para Term  AB Living   5 4 3 1 1 3   SAB IAB Ectopic Multiple Live Births   1    1      # Outcome Date GA Lbr Reginald/2nd Weight Sex Type Anes PTL Lv   5 Term 14 39w1d  3.195 kg (7 lb 0.7 oz) F CS-LTranv Spinal N GUI   4  2012 22w0d  0.709 kg (1 lb 9 oz) M       3 Term 09/10/08 38w0d  3.629 kg (8 lb) M CS-LTranv EPI     2 Term 05 42w0d  3.345 kg (7 lb 6 oz) M CS-LTranv EPI     1 SAB                 OBJECTIVE:     /71 (Patient Position: Sitting)   Pulse 79   Ht 5' (1.524 m)   Wt 50.7 kg (111 lb 12.4 oz)   LMP 2019   BMI 21.83 kg/m²        PE:   APPEARANCE: Well nourished, well developed, no acute distress.  NODES: no inguinal lymphadenopathy  BREASTS: Symmetrical, no skin changes or visible lesions. No palpable masses, nipple discharge or adenopathy bilaterally.  ABDOMEN: Soft. No tenderness or masses. No distention.   VULVA: No lesions. Normal external female genitalia.  URETHRAL MEATUS: Normal size and location, no lesions, no prolapse.  URETHRA: No masses, tenderness, or prolapse.  VAGINA: Moist. No lesions or lacerations noted. No odor present. ++thick white adherent discharge   CERVIX: absent  UTERUS:  absent  ADNEXA: No tenderness. No fullness or masses palpated in the adnexal regions.   ANUS PERINEUM: Normal.      Diagnosis:  1. Routine gynecological examination    2. Mood changes    3. Hot flashes    4. Vaginal dryness    5. Acute vaginitis        Plan:     Shena was seen today for well woman.    Diagnoses and all orders for this visit:    Routine gynecological examination    Mood changes  -     estradioL (ESTRACE) 1 MG tablet; Take 2 tablets (2 mg total) by mouth once daily.    Hot flashes  -     estradioL (ESTRACE) 1 MG tablet; Take 2 tablets (2 mg total) by mouth once daily.    Vaginal dryness  -     estradioL (ESTRACE) 0.01 % (0.1 mg/gram) vaginal cream; Use pea-sized amount vaginally nightly for 2 weeks, then 2x/week    Acute vaginitis  -     fluconazole (DIFLUCAN) 150 MG Tab; Take 1 tablet (150 mg total) by mouth Every 3 (three) days. for 3 doses  -     Vaginosis Screen by DNA Probe    - r/b/a HT reviewed; if hormonal changes not cause of concerns she may not see a benefit but given lack of reliable diagnostic test to determine such we will trial low dose estradiol  - I think she will see an improvement in vaginal discomfort after diflucan, but if dryness persists try the vaginal estrogen  - encouraged trial new medication with psychiatry if that is their recommendation     Patient was counseled today on the current  ACS guidelines for cervical cytology screening as well as the current recommendations for breast cancer screening.   She was counseled to follow up with her PCP for other routine health maintenance.    Pap/ Pap+HPV next due: n/a, can be deferred indefinitely (hysterectomy) with no history of severe dysplasia/cervical procedures    RTC 3 months for medication f/u

## 2025-04-19 LAB
BACTERIAL VAGINOSIS DNA (OHS): NOT DETECTED
CANDIDA GLABRATA/KRUSEI DNA (OHS): NOT DETECTED
CANDIDA SPECIES DNA (OHS): NOT DETECTED
TRICHOMONAS VAGINALIS DNA (OHS): NOT DETECTED

## 2025-04-22 ENCOUNTER — PATIENT MESSAGE (OUTPATIENT)
Dept: NEUROLOGY | Facility: CLINIC | Age: 40
End: 2025-04-22
Payer: COMMERCIAL

## 2025-05-09 ENCOUNTER — TELEPHONE (OUTPATIENT)
Dept: PSYCHOLOGY | Facility: CLINIC | Age: 40
End: 2025-05-09
Payer: COMMERCIAL

## 2025-05-11 ENCOUNTER — PATIENT MESSAGE (OUTPATIENT)
Dept: PSYCHOLOGY | Facility: CLINIC | Age: 40
End: 2025-05-11
Payer: COMMERCIAL

## 2025-05-12 ENCOUNTER — OFFICE VISIT (OUTPATIENT)
Dept: PSYCHOLOGY | Facility: CLINIC | Age: 40
End: 2025-05-12
Payer: COMMERCIAL

## 2025-05-12 DIAGNOSIS — R63.2 BINGE EATING: Primary | ICD-10-CM

## 2025-05-12 DIAGNOSIS — F32.81 PMDD (PREMENSTRUAL DYSPHORIC DISORDER): ICD-10-CM

## 2025-05-12 DIAGNOSIS — F42.9 OBSESSIVE-COMPULSIVE DISORDER, UNSPECIFIED TYPE: ICD-10-CM

## 2025-05-12 DIAGNOSIS — F41.1 GENERALIZED ANXIETY DISORDER: ICD-10-CM

## 2025-05-12 PROCEDURE — 90833 PSYTX W PT W E/M 30 MIN: CPT | Mod: 95,,, | Performed by: NURSE PRACTITIONER

## 2025-05-12 PROCEDURE — 98006 SYNCH AUDIO-VIDEO EST MOD 30: CPT | Mod: 95,,, | Performed by: NURSE PRACTITIONER

## 2025-05-12 PROCEDURE — G2211 COMPLEX E/M VISIT ADD ON: HCPCS | Mod: 95,,, | Performed by: NURSE PRACTITIONER

## 2025-05-12 RX ORDER — SERTRALINE HYDROCHLORIDE 50 MG/1
50 TABLET, FILM COATED ORAL DAILY
Qty: 30 TABLET | Refills: 1 | Status: SHIPPED | OUTPATIENT
Start: 2025-05-12 | End: 2025-07-11

## 2025-05-12 RX ORDER — DEXTROAMPHETAMINE SACCHARATE, AMPHETAMINE ASPARTATE MONOHYDRATE, DEXTROAMPHETAMINE SULFATE AND AMPHETAMINE SULFATE 1.25; 1.25; 1.25; 1.25 MG/1; MG/1; MG/1; MG/1
5 CAPSULE, EXTENDED RELEASE ORAL DAILY
Qty: 30 CAPSULE | Refills: 0 | Status: SHIPPED | OUTPATIENT
Start: 2025-05-12 | End: 2025-06-11

## 2025-05-12 NOTE — PROGRESS NOTES
The patient location is: [facility]  The chief complaint leading to consultation is: Binge Eating, Anxiety, PMDD    Visit type: audiovisual    Each patient to whom he or she provides medical services by telemedicine is:  (1) informed of the relationship between the physician and patient and the respective role of any other health care provider with respect to management of the patient; and (2) notified that he or she may decline to receive medical services by telemedicine and may withdraw from such care at any time.    Notes:     Outpatient Psychiatry Follow-Up Visit ([facility])    05/12/2025    Clinical Status of Patient:  Outpatient (Ambulatory)    Chief Complaint:  Shena Gomez is a 39 y.o. female who presents today for follow-up of depression, anxiety, and PMDD.  Met with patient.     Current Medications:   Zoloft 25 mg Daily    Past Medication Trials:  Wellbutrin- agitation   Adderall - keeps her focused and alert, effective for binge eating, causes agitation, hair loss crash sensation when it wearing off.  Vyvanse- not tried, not filled due to prive     Interval History and Content of Current Session:  Patient seen and chart reviewed. Last seen on 3/19/25    Changes at last visit:  D/C Prozac  D/C adderall IR  Start Zoloft 25 mg daily titrate to effect  consider changing to Adderall 5 mg XR at next visit.   History of Present Illness    HPI:  Patient discontinued Prozac and Adderall in March and started sertraline at a low dose of 25 mg, reporting no significant changes. She recently began taking estrogen prescribed by her gynecologist to address tiredness, but denies improvement.    Patient reports persistent extreme fatigue, necessitating two-hour naps daily, particularly problematic in the afternoon when her children return home from school. This fatigue has continued despite medication changes and the addition of estrogen.    Patient has experienced a return of binge eating behaviors, particularly  "focused on sugary foods. She describes feeling "addicted to sugar" and having difficulty controlling her consumption, recently consuming an entire bag of sugar-free candy in one day.    Patient rates her average anxiety level at 6 or 7 out of 10. Her mood fluctuates cyclically, with more severe symptoms occurring towards the end of each month, including irritability, feeling overwhelmed, and increased sadness. She rates her depression during these periods at 5 out of 10, while during better periods, it's at 0 or 1.    Patient reports going to bed at 8:30 PM and waking at 6:00 AM, with generally good sleep quality. However, she still experiences significant daytime fatigue leading to long naps.    Patient notes that Adderall (immediate release) was effective in reducing her eating and fatigue but caused hair loss, which has since resolved after discontinuation. She denies experiencing panic attacks or suicidal thoughts, and has no cardiac history or issues.    LIFESTYLE:  Patient lives with her children.      ROS:  General: -fever, -chills, +fatigue, -weight gain, -weight loss  Eyes: -vision changes, -redness, -discharge  ENT: -ear pain, -nasal congestion, -sore throat  Cardiovascular: -chest pain, -palpitations, -lower extremity edema  Respiratory: -cough, -shortness of breath  Gastrointestinal: -abdominal pain, -nausea, -vomiting, -diarrhea, -constipation, -blood in stool  Genitourinary: -dysuria, -hematuria, -frequency  Musculoskeletal: -joint pain, -muscle pain  Skin: -rash, -lesion, +acne  Neurological: -headache, -dizziness, -numbness, -tingling, +excessive drowsiness, +hypersomnia  Psychiatric: +anxiety, -depression, -sleep difficulty, +food binging, +mood swings, +irritability, +emotional lability         Denies SI/HI/AVH. Denies adverse effects from medication  Pt reports taking medications as prescribed and behaving appropriately during interview today.    Pt appears:  Appropriate attire and affet    Mood: "  Content, Happy     Sleep:  Naps 2 hrs, feels fatigue most days    Appetite:  Binge eating,     Self Rates Depression: 6-7/10  Self Rated Anxiety:  0-5 (5 with PMDD) /10      Psychotherapy:  Target symptoms: depression, anxiety , PMDD  Why chosen therapy is appropriate versus another modality: relevant to diagnosis, evidence based practice  Outcome monitoring methods: self-report, observation  Therapeutic intervention type: insight oriented psychotherapy, supportive psychotherapy  Topics discussed/themes: symptom recognition  The patient's response to the intervention is accepting, motivated. The patient's progress toward treatment goals is good.   Duration of intervention: 16 minutes.    Review of Systems   PSYCHIATRIC: Pertinant items are noted in the narrative.        Past Medical, Family and Social History: The patient's past medical, family and social history have been reviewed and updated as appropriate within the electronic medical record - see encounter notes.    Compliance: yes    Side effects: None    Risk Parameters:  Patient reports no suicidal ideation  Patient reports no homicidal ideation  Patient reports no self-injurious behavior  Patient reports no violent behavior    Exam (detailed: at least 9 elements; comprehensive: all 15 elements)   Constitutional  Vitals:  Most recent vital signs, dated less than 90 days prior to this appointment, were reviewed.   There were no vitals filed for this visit.     General:  unremarkable, age appropriate     Musculoskeletal  Muscle Strength/Tone:  not examined   Gait & Station:  non-ataxic     Psychiatric  Physical Exam    Appearance: Appears stated age. Well-groomed. Well-nourished.  Speech: Normal rate. Normal volume. Spontaneous and fluid.  Affect: Appropriate.  Thought Content: No evidence of aggression. No evidence of homicidal ideation. No evidence of homicidal plan. No evidence of homicidal intent. No evidence of suicidal ideation. No evidence of suicidal  plan. No evidence of suicidal intent. No evidence of delusions.  Memory: Recent memory intact. Remote memory intact.  Behavior: Cooperative. Good eye contact. Engaged. Pleasant.  Mood: Euthymic.  Thought Form: Linear thinking. Goal oriented and directed.  Perception: No perceptual abnormalities noted.  Judgement: Intact as evidenced by decision making in the recent past.  Insight: Good insight into symptoms. Good insight into treatment options.  Cognition: A&Ox3. Normal attention span. Average fund of knowledge.  Motor: No gross motor abnormalities.         Assessment and Diagnosis   Status/Progress: Based on the examination today, the patient's problem(s) is/are treatment resistant.  New problems have not been presented today.   Co-morbidities, Diagnostic uncertainty, and Lack of compliance are not complicating management of the primary condition.  There are no active rule-out diagnoses for this patient at this time.     General Impression: Shena Gomez, a 39 y.o. female, presenting for follow up visit for Obsessive Compulsive Disorder, Generalized Anxiety, and PMDD.   Presents 04/07/2025 - d/c Prozac, D/c adderall IR, Start Zoloft 25 mg daily titrate to effect, consider changing to Adderall 5 mg XR at next visit.   Presents 05/12/2025 - Start Adderall 5 mg XR, Increase Zoloft to 50 mg Daily       ICD-10-CM ICD-9-CM    1. Binge eating  R63.2 783.6 dextroamphetamine-amphetamine (ADDERALL XR) 5 MG 24 hr capsule      2. PMDD (premenstrual dysphoric disorder)  F32.81 625.4 sertraline (ZOLOFT) 50 MG tablet      3. Generalized anxiety disorder  F41.1 300.02 sertraline (ZOLOFT) 50 MG tablet      4. Obsessive-compulsive disorder, unspecified type  F42.9 300.3           Intervention/Counseling/Treatment Plan   Assessment & Plan    IMPRESSION:  Increased sertraline from 25 mg to 50 mg daily, to be started immediately to address persistent anxiety and mood fluctuations.  Plan to initiate extended-release Adderall 5 mg  daily after 2 weeks of increased sertraline dose to address fatigue and attention issues, emphasizing steady onset and offset compared to immediate-release formulation.  Considered potential interaction between sertraline, Adderall, and estrogen therapy; determined no significant contraindications.  Evaluated report of continued fatigue, binge eating behaviors, and cyclical mood changes post-hysterectomy.  Assessed for side effects of previous medications, noting resolution of hair loss after discontinuation of immediate-release Adderall.    ANXIETY AND MOOD DISORDERS:  - Increased sertraline from 25 mg to 50 mg daily, to be started immediately (take two 25 mg tablets daily until new 50 mg prescription is filled) to address persistent anxiety and mood fluctuations.  - Explained potential side effects of increased sertraline dose, including possible short-term GI disturbances.    BINGE EATING DISORDER  - Plan to initiate extended-release Adderall 5 mg daily after 2 weeks of increased sertraline dose to address fatigue and attention issues, emphasizing steady onset and offset compared to immediate-release formulation.  - Explained stimulant effects of Adderall on sleep patterns and potential impact on daytime fatigue.    FOLLOW-UP:  - Follow up in 4 weeks to assess response to medication changes and discuss any necessary adjustments.       Discussed diagnosis, risk and benefits of proposed treatment above vs alternative treatment vs no treatment, and potential side effects of these treatments, and the inherent unpredictability of individual responses to these treatments. The patient expresses understanding and gives informed consent to pursue treatment at this time, believing that the potential benefits outweigh the potential risks. Patient has no other questions. Risks/adverse effects at this time include but are not limited to: GI side effects, sexual dysfunction, activation vs sedation, triggering of suicidal  ideation, and serotonin syndrome.   Patient voices understanding and agreement with this plan  Provided crisis numbers  Encouraged patient to keep future appointments  Instruct patient to call or message with questions  In the event of an emergency, including suicidal ideation, patient was advised to go to the emergency room  This note was generated with the assistance of ambient listening technology. Verbal consent was obtained by the patient and accompanying visitor(s) for the recording of patient appointment to facilitate this note. I attest to having reviewed and edited the generated note for accuracy, though some syntax or spelling errors may persist. Please contact the author of this note for any clarification.        Return to Clinic: 1 month        Zoila Cheng DNP, PMDARCYP, FNP

## 2025-05-28 ENCOUNTER — PATIENT MESSAGE (OUTPATIENT)
Dept: DERMATOLOGY | Facility: CLINIC | Age: 40
End: 2025-05-28
Payer: COMMERCIAL

## 2025-05-28 ENCOUNTER — PROCEDURE VISIT (OUTPATIENT)
Dept: NEUROLOGY | Facility: CLINIC | Age: 40
End: 2025-05-28
Payer: COMMERCIAL

## 2025-05-28 DIAGNOSIS — G43.719 INTRACTABLE CHRONIC MIGRAINE WITHOUT AURA AND WITHOUT STATUS MIGRAINOSUS: Primary | ICD-10-CM

## 2025-05-28 NOTE — PROCEDURES
BOTOX was performed as an indicated therapy for intractable chronic migraine headaches given that the patient failed __ prophylactic medications    Botulinum Toxin Injection Procedure   Pre-operative diagnosis: Chronic migraine   Post-operative diagnosis: Same   Procedure: Chemical neurolysis   After risks and benefits were explained including bleeding, infection, worsening of pain, damage to the areas being injected, weakness of muscles, loss of muscle control, dysphagia if injecting the head or neck, facial droop if injecting the facial area, painful injection, allergic or other reaction to the medications being injected, and the failure of the procedure to help the problem, a signed consent was obtained.   The patient was placed in a comfortable area and the sites to be treated were identified.The area to be treated was prepped three times with alcohol and the alcohol allowed to dry. Next, a 30 gauge needle was used to inject the medication in the area to be treated.   Area(s) injected:   Total Botox used: 185 Units   Botox wastage: 15 Units   Injection sites:    muscle bilaterally ( a total of 10 units divided into 2 sites)   Procerus muscle (5 units)   Frontalis muscle bilaterally (a total of 20 units divided into 4 sites)   Temporalis muscle bilaterally (a total of 40 units divided into 8 sites)   Occipitalis muscle bilaterally (a total of 30 units divided into 6 sites)   Cervical paraspinal muscles (a total of 20 units divided into 4 sites)   Trapezius muscle bilaterally (a total of 60 units divided into 6 sites)   Complications: none         RTC for the next Botox injection: 3 months               Kristine Hobbs MD  05/28/2025

## 2025-05-29 ENCOUNTER — PATIENT MESSAGE (OUTPATIENT)
Dept: OBSTETRICS AND GYNECOLOGY | Facility: CLINIC | Age: 40
End: 2025-05-29
Payer: COMMERCIAL

## 2025-05-29 ENCOUNTER — PATIENT MESSAGE (OUTPATIENT)
Dept: NEUROLOGY | Facility: CLINIC | Age: 40
End: 2025-05-29
Payer: COMMERCIAL

## 2025-05-30 ENCOUNTER — OFFICE VISIT (OUTPATIENT)
Dept: OBSTETRICS AND GYNECOLOGY | Facility: CLINIC | Age: 40
End: 2025-05-30
Payer: COMMERCIAL

## 2025-05-30 DIAGNOSIS — R53.82 CHRONIC FATIGUE: Primary | ICD-10-CM

## 2025-05-30 DIAGNOSIS — R45.86 MOOD CHANGES: ICD-10-CM

## 2025-05-30 NOTE — PROGRESS NOTES
The patient location is: home ([place])  The chief complaint leading to consultation is: hormone f/u    Visit type: audiovisual    Face to Face time with patient: 10 min  16 minutes of total time spent on the encounter, which includes face to face time and non-face to face time preparing to see the patient (eg, review of tests), Obtaining and/or reviewing separately obtained history, Documenting clinical information in the electronic or other health record, Independently interpreting results (not separately reported) and communicating results to the patient/family/caregiver, or Care coordination (not separately reported).         Each patient to whom he or she provides medical services by telemedicine is:  (1) informed of the relationship between the physician and patient and the respective role of any other health care provider with respect to management of the patient; and (2) notified that he or she may decline to receive medical services by telemedicine and may withdraw from such care at any time.      Notes:    HPI:  Shena is a 39 y.o.  seen virtually today for hormone f/u  Not noticing a change with estradiol  Wondering about testosterone therapy  Was considering pellets but this is costly  Ongoing struggles with fatigue, mood, weight  Sleep quality is very good  Anxiety doing better sees psychiatry     OB History    Para Term  AB Living   5 4 3 1 1 3   SAB IAB Ectopic Multiple Live Births   1    1      # Outcome Date GA Lbr Reginald/2nd Weight Sex Type Anes PTL Lv   5 Term 14 39w1d  3.195 kg (7 lb 0.7 oz) F CS-LTranv Spinal N GUI   4  2012 22w0d  0.709 kg (1 lb 9 oz) M       3 Term 09/10/08 38w0d  3.629 kg (8 lb) M CS-LTranv EPI     2 Term 05 42w0d  3.345 kg (7 lb 6 oz) M CS-LTranv EPI     1 SAB                 PE:   APPEARANCE: Well nourished, well developed female in no acute distress.  RESPIRATORY: normal respiratory effort  NEURO: alert and oriented  PSYCH: normal mood  and affect      Diagnosis:  1. Chronic fatigue    2. Mood changes        Plan:   Diagnoses and all orders for this visit:    Chronic fatigue  -     Follicle Stimulating Hormone; Future  -     Estradiol; Future  -     TSH; Future  -     Testosterone Panel; Future    Mood changes      Needs updated labs  Discussed r/b/a testosterone therapy will check labs and likely trial IM tesosterone  D/c estradiol     F/U based on labs. 3 months if we start testosterone     Ally Yeung MD  Obstetrics & Gynecology   [facility]

## 2025-06-13 ENCOUNTER — TELEPHONE (OUTPATIENT)
Dept: PSYCHOLOGY | Facility: CLINIC | Age: 40
End: 2025-06-13
Payer: COMMERCIAL

## 2025-06-14 ENCOUNTER — PATIENT MESSAGE (OUTPATIENT)
Dept: NEUROLOGY | Facility: CLINIC | Age: 40
End: 2025-06-14
Payer: COMMERCIAL

## 2025-06-16 ENCOUNTER — PATIENT MESSAGE (OUTPATIENT)
Dept: PSYCHOLOGY | Facility: CLINIC | Age: 40
End: 2025-06-16
Payer: COMMERCIAL

## 2025-06-18 ENCOUNTER — OFFICE VISIT (OUTPATIENT)
Dept: PRIMARY CARE CLINIC | Facility: CLINIC | Age: 40
End: 2025-06-18
Payer: COMMERCIAL

## 2025-06-18 VITALS
WEIGHT: 114.5 LBS | DIASTOLIC BLOOD PRESSURE: 70 MMHG | TEMPERATURE: 99 F | RESPIRATION RATE: 15 BRPM | OXYGEN SATURATION: 99 % | HEIGHT: 60 IN | SYSTOLIC BLOOD PRESSURE: 90 MMHG | HEART RATE: 96 BPM | BODY MASS INDEX: 22.48 KG/M2

## 2025-06-18 DIAGNOSIS — Z00.00 ANNUAL PHYSICAL EXAM: Primary | ICD-10-CM

## 2025-06-18 DIAGNOSIS — R53.83 FATIGUE, UNSPECIFIED TYPE: ICD-10-CM

## 2025-06-18 PROBLEM — R29.898 DECREASED ROM OF NECK: Status: RESOLVED | Noted: 2024-10-30 | Resolved: 2025-06-18

## 2025-06-18 PROCEDURE — 99999 PR PBB SHADOW E&M-EST. PATIENT-LVL III: CPT | Mod: PBBFAC,,, | Performed by: NURSE PRACTITIONER

## 2025-06-19 ENCOUNTER — PATIENT MESSAGE (OUTPATIENT)
Dept: PRIMARY CARE CLINIC | Facility: CLINIC | Age: 40
End: 2025-06-19
Payer: COMMERCIAL

## 2025-06-20 ENCOUNTER — PATIENT MESSAGE (OUTPATIENT)
Dept: OBSTETRICS AND GYNECOLOGY | Facility: CLINIC | Age: 40
End: 2025-06-20
Payer: COMMERCIAL

## 2025-06-20 ENCOUNTER — RESULTS FOLLOW-UP (OUTPATIENT)
Dept: PRIMARY CARE CLINIC | Facility: CLINIC | Age: 40
End: 2025-06-20

## 2025-06-20 NOTE — PROGRESS NOTES
Assessment:       1. Annual physical exam    2. Fatigue, unspecified type         Plan:       Annual physical exam  -     CBC Auto Differential; Future; Expected date: 06/18/2025  -     Comprehensive Metabolic Panel; Future; Expected date: 06/18/2025  -     Lipid Panel; Future; Expected date: 06/18/2025  -     TSH; Future; Expected date: 06/18/2025  -     Hemoglobin A1C; Future; Expected date: 06/18/2025  -     Vitamin D; Future; Expected date: 06/18/2025  -     Vitamin B12; Future; Expected date: 06/18/2025  -     Iron; Future; Expected date: 06/18/2025  -     Iron and TIBC; Future; Expected date: 06/18/2025  -     CBC Auto Differential; Future; Expected date: 06/18/2025  -     Comprehensive Metabolic Panel; Future; Expected date: 06/18/2025  -     Lipid Panel; Future; Expected date: 06/18/2025  -     TSH; Future; Expected date: 06/18/2025  -     Vitamin D; Future; Expected date: 06/18/2025  -     Vitamin B12; Future; Expected date: 06/18/2025  -     Ferritin; Future; Expected date: 06/18/2025  -     Iron and TIBC; Future; Expected date: 06/18/2025  -     Folate; Future; Expected date: 06/18/2025  -     Cortisol; Future; Expected date: 06/18/2025  -     Hemoglobin A1C; Future; Expected date: 06/18/2025    Fatigue, unspecified type  -     Cortisol; Future; Expected date: 06/18/2025      Assessment & Plan    IMPRESSION:  - Considered reported symptoms of dizziness, nausea, and leg tingling.  - Noted low BP, likely due to dehydration.  - Evaluated recent medication changes, including cessation of fluoxetine and Adderall.  - Assessed potential hormonal factors, including recent estrogen trial and consideration of testosterone testing.  - Considered iron deficiency as a possible cause of fatigue and shortness of breath.  - Evaluated sleep patterns and potential impact on energy levels.  - Considered vitamin deficiencies as a possible contributor to symptoms.  - Evaluated potential allergies as a cause of breathing  difficulties.    ANXIETY DISORDER:   Discontinued Zoloft (sertraline) and Prozac (fluoxetine) as they were not effective for the patient's anxiety.   Sehna reports having anxiety since childhood, which she has learned to use to her benefit.   Will communicate with psychiatrist Jeannie Cheng regarding medication management.    OBSESSIVE-COMPULSIVE DISORDER:   Shena reports having OCD since childhood and has learned to use it to her benefit.    BINGE EATING DISORDER:   Shena reports binge eating during her menstrual cycle, which was effectively managed with Adderall.   However, patient discontinued Adderall XR despite its effectiveness due to side effects.    CHRONIC MIGRAINE:   Continue Botox treatment for management of migraines.    CONSTIPATION:   Shena reports constipation, which improved after discontinuing Zoloft and Prozac.    PREMENSTRUAL DYSPHORIC DISORDER (PMDD):   Shena reports PMDD, for which Zoloft and Prozac were prescribed but found ineffective.   Shena has discontinued these medications.    PARESTHESIA:   Shena reports tingling in legs, possibly related to iron deficiency.   Ordered iron level testing to investigate the cause.    DIZZINESS AND HYPOTENSION:   Shena reports dizziness and nausea, possibly related to hypotension and dehydration.   Explained importance of proper hydration for maintaining blood pressure and overall health.   Instructed patient to increase water intake to at least 4-6 cups per day, aiming for 2 24-ounce cups daily, and to alternate hydration drinks with plain water.    FATIGUE:   Shena reports extreme fatigue despite adequate sleep, possibly related to hormonal changes or vitamin deficiency.   Discussed potential impact of carbohydrate consumption on energy levels and educated on importance of protein intake, suggesting pairing carbohydrates with protein (e.g., banana with peanut butter).   Explained potential benefits of exercise for energy levels and  recommended incorporating 30 minutes of daily walking.    LABS AND FOLLOW-UP:   Ordered comprehensive labs including CBC, CMP, lipid panel, thyroid function tests, A1C, cortisol level, iron studies, vitamin D level, and testosterone level (to be collected before 11:00 AM).   Schedule follow-up visit to discuss lab results.       Medication List with Changes/Refills   Current Medications    DEXTROAMPHETAMINE-AMPHETAMINE (ADDERALL XR) 5 MG 24 HR CAPSULE    Take 1 capsule (5 mg total) by mouth once daily.   Discontinued Medications    CELECOXIB (CELEBREX) 100 MG CAPSULE    Take 1 capsule (100 mg total) by mouth 2 (two) times daily.    CYCLOBENZAPRINE (FLEXERIL) 5 MG TABLET    TAKE 1 TABLET BY MOUTH THREE TIMES A DAY AS NEEDED FOR MUSCLE SPASMS    ESTRADIOL (ESTRACE) 0.01 % (0.1 MG/GRAM) VAGINAL CREAM    Use pea-sized amount vaginally nightly for 2 weeks, then 2x/week    IMIQUIMOD (ALDARA) 5 % CREAM        RIZATRIPTAN (MAXALT) 10 MG TABLET    Take one tablet (10 mg) at the onset of headaches; if symptoms persist or return, may repeat dose after 2 hours. maximum dose: 20 mg per 24 hours    SERTRALINE (ZOLOFT) 50 MG TABLET    Take 1 tablet (50 mg total) by mouth once daily.         Subjective:    Patient ID: Shena Gomez is a 39 y.o. female.  Chief Complaint: Annual Exam    History of Present Illness    CHIEF COMPLAINT:  Shena presents today for dizziness and fatigue for the past two weeks.    FATIGUE AND SLEEP:  She reports extreme tiredness requiring daily two-hour naps around 12:00 PM or early afternoon. She sleeps approximately 12 hours per day, going to bed around 8:30 PM and waking up around 8:00 AM. She expresses concern about her excessive fatigue interfering with her ability to be active with her children.    CARDIOVASCULAR:  She reports low blood pressure based on recent self-measurement. She experienced one episode of shortness of breath during an event, which resolved spontaneously. Her  suggested  "it might have been a panic attack, though she denies feeling anxious during this episode.    NEUROLOGICAL:  She describes tingling sensations in both legs, characterized as feeling like "little hairs" or "something moving" in her legs.    PSYCHIATRIC HISTORY:  She has a history of anxiety and obsessive-compulsive disorder since childhood, as well as premenstrual dysphoric disorder (PMDD). She reports history of binge eating during menstrual periods.    MEDICATIONS:  She recently discontinued fluoxetine due to concerns about extreme tiredness and Zoloft 4-5 days ago. She previously tried Adderall XR but discontinued due to initial benefit followed by mid-day fatigue and crashes around 2:00 PM. She tried estrogen pills prescribed by gynecologist for one month without improvement, leading to discontinuation. She currently receives Botox treatments for migraine management.    GI CONCERNS:  She reports chronic constipation that has improved since discontinuing Zoloft, now having daily bowel movements without difficulty.    DIET:  She recently eliminated sugar and sweet tea from her diet. She consumes one cup of coffee in the morning and primarily eats chicken with occasional steak. She denies alcohol consumption and smoking.       Review of Systems   Constitutional:  Positive for fatigue. Negative for chills and fever.   HENT:  Negative for ear pain, nosebleeds, sinus pressure and sore throat.    Respiratory:  Negative for cough and shortness of breath.    Cardiovascular:  Negative for chest pain and palpitations.   Gastrointestinal:  Negative for diarrhea, nausea and vomiting.   Genitourinary: Negative.    Psychiatric/Behavioral:  Negative for dysphoric mood and sleep disturbance. The patient is not nervous/anxious.        Objective:      Vitals:    06/18/25 1002   BP: 90/70   BP Location: Right arm   Patient Position: Sitting   Pulse: 96   Resp: 15   Temp: 98.6 °F (37 °C)   TempSrc: Oral   SpO2: 99%   Weight: 51.9 kg " (114 lb 8.5 oz)   Height: 5' (1.524 m)     BP Readings from Last 5 Encounters:   06/18/25 90/70   04/14/25 103/71   01/13/25 106/72   11/11/24 114/73   10/24/24 102/68     Wt Readings from Last 5 Encounters:   06/18/25 51.9 kg (114 lb 8.5 oz)   04/14/25 50.7 kg (111 lb 12.4 oz)   01/13/25 49.8 kg (109 lb 12.6 oz)   11/11/24 53.3 kg (117 lb 8.1 oz)   10/24/24 53.8 kg (118 lb 9.7 oz)     Physical Exam  Constitutional:       General: She is not in acute distress.     Appearance: Normal appearance. She is not ill-appearing.   HENT:      Head: Normocephalic.      Mouth/Throat:      Mouth: Mucous membranes are moist.      Pharynx: No pharyngeal swelling or oropharyngeal exudate.      Tonsils: No tonsillar exudate.   Eyes:      Conjunctiva/sclera:      Right eye: Right conjunctiva is not injected.      Left eye: Left conjunctiva is not injected.   Cardiovascular:      Rate and Rhythm: Normal rate and regular rhythm.      Pulses:           Radial pulses are 1+ on the right side and 1+ on the left side.      Heart sounds: No murmur heard.     No systolic murmur is present.      No diastolic murmur is present.   Pulmonary:      Effort: No tachypnea or bradypnea.      Breath sounds: Normal breath sounds. No decreased breath sounds, wheezing, rhonchi or rales.   Abdominal:      General: There is no distension.   Musculoskeletal:      Right lower leg: No edema.      Left lower leg: No edema.   Skin:     General: Skin is warm and dry.   Neurological:      Mental Status: She is alert.   Psychiatric:         Attention and Perception: Attention normal.         Speech: Speech normal.         Behavior: Behavior normal.     Due to medical complexity of patient and communication obstacles, I spent over 35 minutes in room with patient in education, discussion, examination and over 10 minutes in review of chart both before and after the appointment.         Lab Results   Component Value Date    WBC 8.86 06/18/2025    HGB 14.6 06/18/2025     HCT 43.8 06/18/2025     06/18/2025    CHOL 211 (H) 06/18/2025    TRIG 110 06/18/2025    HDL 57 06/18/2025    ALT 9 (L) 06/18/2025    AST 24 06/18/2025     06/18/2025    K 3.6 06/18/2025     06/18/2025    CREATININE 0.6 06/18/2025    BUN 10 06/18/2025    CO2 24 06/18/2025    TSH 1.855 06/18/2025    HGBA1C 4.9 06/18/2025      This note was generated with the assistance of ambient listening technology. Verbal consent was obtained by the patient and accompanying visitor(s) for the recording of patient appointment to facilitate this note. I attest to having reviewed and edited the generated note for accuracy, though some syntax or spelling errors may persist. Please contact the author of this note for any clarification.

## 2025-06-21 ENCOUNTER — PATIENT MESSAGE (OUTPATIENT)
Dept: GASTROENTEROLOGY | Facility: CLINIC | Age: 40
End: 2025-06-21
Payer: COMMERCIAL

## 2025-06-23 ENCOUNTER — PATIENT MESSAGE (OUTPATIENT)
Dept: OTOLARYNGOLOGY | Facility: CLINIC | Age: 40
End: 2025-06-23
Payer: COMMERCIAL

## 2025-06-23 ENCOUNTER — OFFICE VISIT (OUTPATIENT)
Dept: NEUROLOGY | Facility: CLINIC | Age: 40
End: 2025-06-23
Payer: COMMERCIAL

## 2025-06-23 ENCOUNTER — TELEPHONE (OUTPATIENT)
Dept: GASTROENTEROLOGY | Facility: CLINIC | Age: 40
End: 2025-06-23
Payer: COMMERCIAL

## 2025-06-23 ENCOUNTER — PATIENT MESSAGE (OUTPATIENT)
Dept: PRIMARY CARE CLINIC | Facility: CLINIC | Age: 40
End: 2025-06-23
Payer: COMMERCIAL

## 2025-06-23 DIAGNOSIS — I95.89 OTHER SPECIFIED HYPOTENSION: Primary | ICD-10-CM

## 2025-06-23 DIAGNOSIS — H81.90 PERIPHERAL VESTIBULOPATHY, UNSPECIFIED LATERALITY: ICD-10-CM

## 2025-06-23 DIAGNOSIS — G43.719 INTRACTABLE CHRONIC MIGRAINE WITHOUT AURA AND WITHOUT STATUS MIGRAINOSUS: ICD-10-CM

## 2025-06-23 DIAGNOSIS — R20.2 PARESTHESIAS: Primary | ICD-10-CM

## 2025-06-23 PROCEDURE — 3044F HG A1C LEVEL LT 7.0%: CPT | Mod: CPTII,95,, | Performed by: STUDENT IN AN ORGANIZED HEALTH CARE EDUCATION/TRAINING PROGRAM

## 2025-06-23 PROCEDURE — 98006 SYNCH AUDIO-VIDEO EST MOD 30: CPT | Mod: 95,,, | Performed by: STUDENT IN AN ORGANIZED HEALTH CARE EDUCATION/TRAINING PROGRAM

## 2025-06-23 NOTE — PROGRESS NOTES
Patient ID: [id number]  The patient location is: she is at home, [place]  The chief complaint leading to consultation is: new symptoms     Visit type: audiovisual    Face to Face time with patient: 23    Each patient to whom he or she provides medical services by telemedicine is:  (1) informed of the relationship between the physician and patient and the respective role of any other health care provider with respect to management of the patient; and (2) notified that he or she may decline to receive medical services by telemedicine and may withdraw from such care at any time.    Notes:     Subjective:     HPI:  Shena Gomez is a very pleasant 39 y.o. female with migraines, anxiety, and history of nephrolithiasis. she is presenting today with new symptoms.    Interval history (06/23/2025):  She has been having a new type of pain in the neck on the left side which radiates upward to the ear and temple. She thinks it may be related to her pillow.   She also has dizziness/lightheadedness and nausea with any head movements even when sitting down or lying down.   They last 30-60 minutes, once it lasted up to a whole day.  These symptoms never wake her up.  She also reports new intermittent tingling in the legs and forearms.   She may be experiencing some clumsiness in the left hand as well.   Migraines have shown >50% reduction with Botox (5 MHDs).     Headache history (5/17/2024):  Age at onset: 2 years ago  Course over time: increasing in both severity and frequency  Location: left-sided occipital to temporal, more recently on the left  Character: dull and tension  Intensity: 7-8 on a scale from 1 to 10  Frequency: up to 15 MHDs.   Duration: several hours  Timing: do not seem to be related to any time of the day   Mild/moderate/severe. Work attendance or other daily activities are affected by the headaches.  Aura: not preceded by an aura  Associated symptoms: N/V and Photosensitivity   Associated neurologic  symptoms: dizziness  Precipitating factors: None which have been determined  Aggravating factors:   Home treatment: Resting in a dark room, Goodies/Excedrin, Tylenol, and Imitrex with little improvement.   ER visits: No  Positive Hx of: No Head trauma and renal stones  Is medication overuse contributing to the patient's current migraine burden: No    She has tried PT, was helpful during the course but headaches recurred after completion.   Family history of migraines in her sister.    Headache Medication history:  AED Neuromodulators  MAOIs  Ergot Alkaloids    Acetazolamide (Diamox) [] Phenelzine (Nardil) [] Dihydroergotamine (Migranal) []   Carbamazepine (Tegretol) [] Tranylcypromine (Parnate) [] Ergotamine (Ergomar) []   Gabapentin (Neurontin) (ineffective) [x] Antihistamine/Serotonergic  Triptans    Lacosamide (Vimpat) [] Cyproheptadine (Periactin) [] Almotriptan (Axert) []   Lamotrigine (Lamictal) [] Antihypertensives  Eletriptan (Relpax) []   Levatiracetam (Keppra) [] Atenolol (Tenormin) [] Frovatriptan (Frova) []   Oxcarbazepine (Trileptal) [] Bisoprostol (Zebeta) [] Naratriptan (Amerge) []   Phenobarbital [] Candesartan (Atacand) [] Rizatriptan (Maxalt) []     Nebivolol (Bystolic)  Sumatriptan (Imitrex) (ineffective) [x]   Levetiracetam (Keppra)  Cardeilol (Coreg) [] Zolmitriptan (Zomig) []   Phenytoin (Dilantin) [] Diltiazem (Cardizem) []     Pregabalin (Lyrica) [] Lisinopril (Prinivil, Zestril) [] Combo Abortives    Primidone (Mysoline) [] Metoprolol (Toprol) [] BC Powder []   Tiagabine (Gabatril) [] Nadolol (Corgard) [] Butalbital and Acetaminophen (Bupap) []   Topiramate (Topamax)  (Trokendi) (wasn't started due to Hx of renal stones) [x] Nicardipine (Cardene) []     Vigabatrin (Sabril) [] Nimodipine (Nimotop) [] Butalbital, Acetaminophen, and caffeine (Fioricet) []   Valproic Acid (Depakote) (Divalproex Sodium) [] Propranolol (Inderal) []     Zonisamide (Zonegran) [] Telmisartan (Micardis) []  Butalbital, Aspirin, and caffeine (Fiorinal) []   Benzodiazepines  Timolol (Blocadren) []     Alprazolam (Xanax) [] Verapamil (Calan, Verelan) [] Butalbital, Caffeine, Acetaminophen, and Codeine (Fioricet with Codeine) []   Diazepam (Valium) [] NSAIDs      Lorazepam (Ativan) [] Acetaminophen (Tylenol) [x]     Clonazepam (Klonopin) [] Acetylsalicylic Acid (Aspirin) [] Butalbital, Caffeine, Aspirin, and Codeine  (Fiorinal with Codeine) []   Antidepressants  Diclofenac (Cambia) []     Amitriptyline (Elavil) [] Ibuprofen (Motrin) [x]     Desipramine (Norpramin) [] Indomethacin (Indocin) [] Aspirin, Caffeine, and Acetaminophen (Excedrin) (Goodys) [x]   Doxepin (Sinequan) [] Ketoprofen (Orudis) []     Fluoxetine (Prozac) [x] Ketorolac (Toradol) [] Acetaminophen, Dichloralphenazone, and Isometheptene (Midrin) []   Imipramine (Tofranil) [] Naproxen (Anaprox) (Aleve) []     Nortriptyline (Pamelor) [] Meclofenamic Acid (Meclomen) []     Venlafaxine (Effexor) [] Meloxicam (Mobic) [] Procedures    Desvenlafazine (Pristiq) [] Monoclonals  Greater occipital nerve block []   Duloxetine (Cymbalta) [] Eptinezumab [] Cervical, Thoracic, Lumbar radiofrequency ablation []   Trazadone [] Erenumab-aooe (Aimovig) [] Spenopalatine ganglion block []   Wellbutrin [x] Galcanezumab (Emgality) [] Occipital neuro stimulation []   Protriptyline (Vivactil) [] Fremanazumab-vfrm (Ajovy)  Cervical, Thoracic, Lumbar, Caudal Epidural steroid injection []   Escitalopram (Lexapro) [] Other [] Sacroiliac joint steroid injection []   Celexa [] Memantine (Namenda) [] Transforaminal epidural steroid injection []   Oral CGRP inhibitors  Botox (effective) [x] Facet joint injections []   Atogepant (Qulipta) [] Baclofen (Lioresal) [] Cervical, Thoracic, Lumbar medial branch blocks []   Rimegepant (Nurtec) [] Tizanidine (Zanaflex) (ineffective) [x] Cefaly []   Ubrogepant (Ubrelvy) [] Cyclobenzaprine (Flexeril) (drowsiness) [x] Gamma Core []    []   Iovera []     []   Transcranial Magnetic stimulation []     Review of Systems:  Review of Systems   HENT:  Negative for congestion.    Eyes:  Negative for blurred vision and double vision.   Gastrointestinal:  Positive for nausea. Negative for vomiting.   Musculoskeletal:  Positive for neck pain. Negative for falls.   Neurological:  Positive for dizziness, sensory change and headaches. Negative for weakness.   Psychiatric/Behavioral:  Negative for memory loss.    All other systems reviewed and are negative.     Past Medical History:  -------------------------------------    Anxiety    Chronic sinusitis    Epigastric pain    Kidney stones    history    Migraines    Nephrolithiasis    OAB (overactive bladder)     Allergies:  Review of patient's allergies indicates:   Allergen Reactions    Celestone [betamethasone] Palpitations       Pertinent Family History:  Family History   Problem Relation Name Age of Onset    Breast cancer Maternal Grandmother  68    Breast cancer Maternal Aunt  56    Esophageal cancer Maternal Aunt      Cancer Neg Hx      Colon cancer Neg Hx      Diabetes Neg Hx      Eclampsia Neg Hx      Miscarriages / Stillbirths Neg Hx      Hypertension Neg Hx      Ovarian cancer Neg Hx       labor Neg Hx      Stroke Neg Hx         Pertinent Social History:  Social History     Tobacco Use    Smoking status: Never    Smokeless tobacco: Never   Substance Use Topics    Alcohol use: Never     Alcohol/week: 0.0 standard drinks of alcohol    Drug use: Never       Medications:  Current Outpatient Medications   Medication Instructions    dextroamphetamine-amphetamine (ADDERALL XR) 5 MG 24 hr capsule 5 mg, Oral, Daily        Objective:     *exam is limited due to the virtual nature of this visit    General:  Well-appearing, well-nourished, NAD, cooperative    Neurologic Exam:   Awake, alert and oriented x3  Speech spontaneous and fluent, intact comprehension.   Adequate fund of knowledge, vocabulary.  EOM intact. No  ophthalmoplegia.   Facial expression is full and symmetric.   Hearing is intact.  Antigravity in BUE. No tremor.      Pertinent lab results  Lab Results   Component Value Date    FOLATE 11.1 06/18/2025    [id number] 561 06/18/2025    [id number] 42 06/18/2025     Lab Results   Component Value Date    TSH 1.855 06/18/2025    WBC 8.86 06/18/2025    LYMPH 26.4 06/18/2025    LYMPH 2.34 06/18/2025    RBC 4.65 06/18/2025    HGB 14.6 06/18/2025    HCT 43.8 06/18/2025    MCV 94 06/18/2025     06/18/2025     06/18/2025    K 3.6 06/18/2025    CO2 24 06/18/2025    BUN 10 06/18/2025    CREATININE 0.6 06/18/2025    CALCIUM 8.6 (L) 06/18/2025    AST 24 06/18/2025    ALT 9 (L) 06/18/2025       Pertinent imaging results    * previously reviewed images:  11/6/2023  MRI Cervical Spine wo contrast:  Mild degenerative changes without significant canal or foraminal stenosis    Other pertinent studies  None    Assessment:   Shena Gomez is a 39 y.o. female with migraines without aura that have responded favorably to treatment with Botox with >50% reduction in frequency. It is medically necessary to continue Botox injections for migraines to maintain a lower frequency, intensity and overall disability burden from migraine attacks.     Regarding new positional dizziness I suspect peripheral vestibulopathy. We will try meclizine as needed and vestibular PT. We will also complete serum work up for potential metabolic and toxic etiologies to neuropathic features.    1. Paresthesias    2. Peripheral vestibulopathy, unspecified laterality    3. Intractable chronic migraine without aura and without status migrainosus      Plan:     - JAIME Screen w/Reflex; Future  - Vitamin B6; Future  - Zinc; Future  - Protein Electrophoresis, Serum; Future  - Immunofixation, Serum; Future  - Heavy Metals Screen, Blood (Quantitative); Future  - Copper, Serum; Future  - meclizine (ANTIVERT) 25 mg tablet; Take 1 tablet (25 mg total) by mouth 3  (three) times daily as needed for Dizziness.  Dispense: 20 tablet; Refill: 5  - Ambulatory Referral/Consult to Physical Therapy  - will continue Botox, per below protocol:      Total Botox used: 155 Units   Botox wastage: 45 Units   Injection sites:    muscle bilaterally ( a total of 10 units divided into 2 sites)   Procerus muscle (5 units)   Frontalis muscle bilaterally (a total of 20 units divided into 4 sites)   Temporalis muscle bilaterally (a total of 40 units divided into 8 sites)   Occipitalis muscle bilaterally (a total of 30 units divided into 6 sites)   Cervical paraspinal muscles (a total of 20 units divided into 4 sites)   Trapezius muscle bilaterally (a total of 30 units divided into 6 sites)               Disclaimer: This note was partly generated using dictation software which may occasionally result in transcription errors that are missed on review.      Based on our encounter today, my overall Medical Decision Making is a Level 4     Complexity of Problem: Moderate (1 undiagnosed new problem with uncertain prognosis)  Complexity of Data: Moderate (Review of >3 unique test results and Ordering >3 unique tests )  Risk of Complications and/or morbidity/mortality of Management: Moderate risk (Prescription drug management)        Kristine Hobbs MD    [facility]  06/23/2025

## 2025-06-24 ENCOUNTER — OFFICE VISIT (OUTPATIENT)
Dept: GASTROENTEROLOGY | Facility: CLINIC | Age: 40
End: 2025-06-24
Payer: COMMERCIAL

## 2025-06-24 ENCOUNTER — PATIENT MESSAGE (OUTPATIENT)
Dept: NEUROLOGY | Facility: CLINIC | Age: 40
End: 2025-06-24
Payer: COMMERCIAL

## 2025-06-24 DIAGNOSIS — R11.0 NAUSEA: ICD-10-CM

## 2025-06-24 DIAGNOSIS — R10.13 EPIGASTRIC PAIN: Primary | ICD-10-CM

## 2025-06-24 DIAGNOSIS — R14.2 BELCHING: ICD-10-CM

## 2025-06-24 PROCEDURE — 1159F MED LIST DOCD IN RCRD: CPT | Mod: CPTII,95,, | Performed by: NURSE PRACTITIONER

## 2025-06-24 PROCEDURE — 3044F HG A1C LEVEL LT 7.0%: CPT | Mod: CPTII,95,, | Performed by: NURSE PRACTITIONER

## 2025-06-24 PROCEDURE — 1160F RVW MEDS BY RX/DR IN RCRD: CPT | Mod: CPTII,95,, | Performed by: NURSE PRACTITIONER

## 2025-06-24 PROCEDURE — 98007 SYNCH AUDIO-VIDEO EST HI 40: CPT | Mod: 95,,, | Performed by: NURSE PRACTITIONER

## 2025-06-24 RX ORDER — MIDODRINE HYDROCHLORIDE 2.5 MG/1
2.5 TABLET ORAL 3 TIMES DAILY
Qty: 90 TABLET | Refills: 3 | Status: SHIPPED | OUTPATIENT
Start: 2025-06-24

## 2025-06-24 RX ORDER — PANTOPRAZOLE SODIUM 40 MG/1
40 TABLET, DELAYED RELEASE ORAL DAILY
Qty: 90 TABLET | Refills: 0 | Status: SHIPPED | OUTPATIENT
Start: 2025-06-24 | End: 2025-09-22

## 2025-06-24 RX ORDER — MECLIZINE HYDROCHLORIDE 25 MG/1
25 TABLET ORAL 3 TIMES DAILY PRN
Qty: 20 TABLET | Refills: 5 | Status: SHIPPED | OUTPATIENT
Start: 2025-06-24

## 2025-06-24 NOTE — PROGRESS NOTES
The patient location is: [place]  The chief complaint leading to consultation is: epigastric abdominal pain, nausea, belching    Visit type: audiovisual    Face to Face time with patient: 32:10  40 minutes of total time spent on the encounter, which includes face to face time and non-face to face time preparing to see the patient (eg, review of tests), Obtaining and/or reviewing separately obtained history, Documenting clinical information in the electronic or other health record, Independently interpreting results (not separately reported) and communicating results to the patient/family/caregiver, or Care coordination (not separately reported).         Each patient to whom he or she provides medical services by telemedicine is:  (1) informed of the relationship between the physician and patient and the respective role of any other health care provider with respect to management of the patient; and (2) notified that he or she may decline to receive medical services by telemedicine and may withdraw from such care at any time.            GASTROENTEROLOGY CLINIC NOTE    Chief Complaint: The primary encounter diagnosis was Epigastric pain. Diagnoses of Belching and Nausea were also pertinent to this visit.  Referring provider/PCP: Dixon Anderson MD    HPI  Shena Gomez is a 39 y.o. female who is a new patient to me who presents via telemedicine encounter for epigastric pain, nausea, and belching.   Last seen by Dr. Singh 2023 for change in stool caliber. Underwent Colonoscopy and stool studies which were negative.   Dizziness, hypotension, fatigue, and nausea have been ongoing over the last few months. She is being followed by PCP and neurology.       Abdominal Pain  How Long: Epigastric abdominal pain Started after taking ibuprofen daily while on vacation a few weeks ago. She forgot her medication for breakthrough migraines and was using ibuprofen for headaches for about a week.    Frequency:  Intermittent/occurring randomly   Location: Epigastric    Quality: Slight burning/aggravating pain   Onset: gradual   Radiate: no   Aggravated or Improved with bowel movement/eating/movement Not always related to eating but can be triggered by eating  No change with bowel movements  No change with movement   Nausea/Vomiting/Unexplained Weight Loss: nausea   Pyrosis/Reflux/Dysphagia: Denies reflux or heartburn  Mostly belching; occurring randomly   Bowel Movements: Daily    Melena/Hematochezia: no    Symptoms: no     Treatments:omeprazole otc  NSAIDs: see above  Caffeine: 1 cup coffee daily  ETOH Use: no    GLP-1s: No  Anticoagulation or Antiplatelet: No    History of H.pylori: no  H.pylori Treatment:  Prior Upper Endoscopy: 2019 with Dr. Blankenship  Normal stomach and esophagus  1cm hiatal hernia  Negative Germaine Test  Prior Colonoscopy: 1/2024 with Dr. Singh  Impression:            - Preparation of the colon was fair.                          - The examined portion of the ileum was normal.                          - The entire examined colon is normal. Biopsied.                          - The examination was otherwise normal on direct                          and retroflexion views.     Recommendation: - Repeat colonoscopy at age 46 for screening                          purposes.                          - Her symptoms are most consistent with                          significant constipation, possibly contributing is                          pelvic adhesional / scar tissue from hysterectomy.     Pathology: 1. Colon, biopsy:   Colonic mucosa with preserved crypt architecture; no diagnostic abnormality.Negative for microscopic colitis.     Family h/o Colon Cancer: No  Family h/o Crohn's Disease or Ulcerative Colitis: No  Family h/o Celiac Sprue: No  Abdominal Surgeries: Laparoscopic salpingectomy, hysterectomy    Review of Systems   Constitutional:  Negative for weight loss.   HENT:  Negative for sore throat.     Eyes:  Negative for blurred vision.   Respiratory:  Negative for cough.    Cardiovascular:  Negative for chest pain.   Gastrointestinal:  Positive for abdominal pain (epigastric) and nausea. Negative for blood in stool, constipation, diarrhea, heartburn, melena and vomiting.   Genitourinary:  Negative for dysuria.   Musculoskeletal:  Negative for myalgias.   Skin:  Negative for rash.   Neurological:  Negative for headaches.   Endo/Heme/Allergies:  Negative for environmental allergies.   Psychiatric/Behavioral:  Negative for suicidal ideas. The patient is not nervous/anxious.        Past Medical History: has a past medical history of Anxiety, Chronic sinusitis, Epigastric pain, Kidney stones, Migraines, Nephrolithiasis, and OAB (overactive bladder).    Past Surgical History: has a past surgical history that includes  section; Tubal ligation (2014); Laparoscopic total hysterectomy (N/A, 2019); Cystoscopy (N/A, 2019); Laparoscopic salpingectomy (Bilateral, 2019); Cervical cerclage (2013); Esophagogastroduodenoscopy (N/A, 2019); Hysterectomy; Benign neoplasm buttock skin (Right, 10/05/2022); Soft Tissue Biopsy (Right, 10/5/2022); and Colonoscopy (N/A, 2024).    Family History:family history includes Breast cancer (age of onset: 56) in her maternal aunt; Breast cancer (age of onset: 68) in her maternal grandmother; Esophageal cancer in her maternal aunt.    Allergies:   Review of patient's allergies indicates:   Allergen Reactions    Celestone [betamethasone] Palpitations       Social History: reports that she has never smoked. She has never used smokeless tobacco. She reports that she does not drink alcohol and does not use drugs.    Home medications: Medications Ordered Prior to Encounter[1]    Vital signs:  LMP 2019     Physical Exam  Constitutional:       Appearance: Normal appearance. She is not ill-appearing.      Comments: Limited Physical Exam d/t  "Telemedicine Encounter   HENT:      Head: Normocephalic.   Pulmonary:      Effort: Pulmonary effort is normal. No respiratory distress.   Neurological:      Mental Status: She is alert and oriented to person, place, and time.   Psychiatric:         Mood and Affect: Mood normal.         Behavior: Behavior normal.         Thought Content: Thought content normal.         Judgment: Judgment normal.         Routine labs:  Lab Results   Component Value Date    WBC 8.86 06/18/2025    HGB 14.6 06/18/2025    HCT 43.8 06/18/2025    MCV 94 06/18/2025     06/18/2025     No results found for: "INR"  Lab Results   Component Value Date    IRON 174 (H) 06/18/2025    FERRITIN 75.0 06/18/2025    TIBC 320 06/18/2025     Lab Results   Component Value Date     06/18/2025    K 3.6 06/18/2025     06/18/2025    CO2 24 06/18/2025    BUN 10 06/18/2025    CREATININE 0.6 06/18/2025     Lab Results   Component Value Date    ALBUMIN 3.3 (L) 06/18/2025    ALT 9 (L) 06/18/2025    AST 24 06/18/2025    ALKPHOS 60 06/18/2025    BILITOT 0.4 06/18/2025     No results found for: "GLUCOSE"  Lab Results   Component Value Date    TSH 1.855 06/18/2025     Lab Results   Component Value Date    CALCIUM 8.6 (L) 06/18/2025       Imaging:      I have reviewed prior labs, imaging, and notes.      Assessment:  1. Epigastric pain    2. Belching    3. Nausea      Epigastric pain started after taking ibuprofen for a week.   Belching and nausea not necessarily related to eating.   Daily bowel movements. Denies constipation.       Plan:  Orders Placed This Encounter    pantoprazole (PROTONIX) 40 MG tablet     Protonix 40mg daily  Consider stopping at follow up pending symptoms  Start daily fiber supplement.   Continue good water intake.     Consider EGD pending symptoms  Consider course of xifaxan in future pending symptoms      Plan of care discussed with patient who is in agreement and verbalized understanding.     I have explained the planned " procedures to the patient.The risks, benefits and alternatives of the procedure were also explained in detail. Patient verbalized understanding, all questions were answered. The patient agrees to proceed as planned    Follow Up: 8-10 weeks          JACOB Nielsen,NATHALYP-BC [facility]    (Portions of this note were dictated using voice recognition software and may contain dictation related errors in spelling/grammar/syntax not found on text review)                             [1]   Current Outpatient Medications on File Prior to Visit   Medication Sig Dispense Refill    midodrine (PROAMATINE) 2.5 MG Tab Take 1 tablet (2.5 mg total) by mouth 3 (three) times daily. (Patient not taking: Reported on 6/24/2025) 90 tablet 3    [DISCONTINUED] dextroamphetamine-amphetamine (ADDERALL XR) 5 MG 24 hr capsule Take 1 capsule (5 mg total) by mouth once daily. 30 capsule 0     No current facility-administered medications on file prior to visit.

## 2025-06-26 ENCOUNTER — E-VISIT (OUTPATIENT)
Dept: PRIMARY CARE CLINIC | Facility: CLINIC | Age: 40
End: 2025-06-26
Payer: COMMERCIAL

## 2025-06-26 ENCOUNTER — PATIENT MESSAGE (OUTPATIENT)
Dept: NEUROLOGY | Facility: CLINIC | Age: 40
End: 2025-06-26
Payer: COMMERCIAL

## 2025-06-26 DIAGNOSIS — N30.00 ACUTE CYSTITIS WITHOUT HEMATURIA: Primary | ICD-10-CM

## 2025-06-26 RX ORDER — NITROFURANTOIN 25; 75 MG/1; MG/1
100 CAPSULE ORAL 2 TIMES DAILY
Qty: 14 CAPSULE | Refills: 0 | Status: SHIPPED | OUTPATIENT
Start: 2025-06-26

## 2025-06-26 RX ORDER — PHENAZOPYRIDINE HYDROCHLORIDE 100 MG/1
100 TABLET, FILM COATED ORAL 3 TIMES DAILY PRN
Qty: 10 TABLET | Refills: 0 | Status: SHIPPED | OUTPATIENT
Start: 2025-06-26 | End: 2025-07-06

## 2025-06-26 NOTE — PROGRESS NOTES
Subjective:       Patient ID: Shena Gomez is a 39 y.o. female.    Chief Complaint: General Illness (Entered automatically based on patient selection in [facility].)    HPI  Pt with s&s cystitis will get U/A and start antibiotic   Review of Systems    Objective:      Physical Exam    Assessment:       1. Acute cystitis without hematuria        Plan:       Acute cystitis without hematuria  -     nitrofurantoin, macrocrystal-monohydrate, (MACROBID) 100 MG capsule; Take 1 capsule (100 mg total) by mouth 2 (two) times daily.  Dispense: 14 capsule; Refill: 0  -     Urinalysis; Future; Expected date: 06/26/2025  -     phenazopyridine (PYRIDIUM) 100 MG tablet; Take 1 tablet (100 mg total) by mouth 3 (three) times daily as needed for Pain (dysuris).  Dispense: 10 tablet; Refill: 0      I spent 5 minutes to review symptoms chart and order medication  Medication List with Changes/Refills   New Medications    NITROFURANTOIN, MACROCRYSTAL-MONOHYDRATE, (MACROBID) 100 MG CAPSULE    Take 1 capsule (100 mg total) by mouth 2 (two) times daily.    PHENAZOPYRIDINE (PYRIDIUM) 100 MG TABLET    Take 1 tablet (100 mg total) by mouth 3 (three) times daily as needed for Pain (dysuris).   Current Medications    MECLIZINE (ANTIVERT) 25 MG TABLET    Take 1 tablet (25 mg total) by mouth 3 (three) times daily as needed for Dizziness.    MIDODRINE (PROAMATINE) 2.5 MG TAB    Take 1 tablet (2.5 mg total) by mouth 3 (three) times daily.    PANTOPRAZOLE (PROTONIX) 40 MG TABLET    Take 1 tablet (40 mg total) by mouth once daily.    Patient ID: Shena Gomez is a 39 y.o. female.        E-Visit Time Tracking:             Chief Complaint: General Illness (Entered automatically based on patient selection in [facility].)      The patient initiated a request through [facility] on 6/26/2025 for evaluation and management with a chief complaint of General Illness (Entered automatically based on patient selection in [facility].)     I evaluated the questionnaire  submission on 06/26/2025.    [facility]    6/26/2025  3:16 AM CDT - Filed by Patient   What do you need help with? Urinary Symptoms   Do you agree to participate in an E-Visit? Yes   If you have any of the following symptoms, please go to the nearest emergency room or call 911: I acknowledge   Do you have any of the following pregnancy-related conditions? (Pregnant, Possibly pregnant, Breast feeding, None) None   What is the main issue you would like addressed today? Pressure, pain and burning when urinating   Do you have any of the following symptoms? ( Discomfort or pain passing urine, Passing urine more frequently, Cloudy urine, Discharge in urine, Other, None) Discomfort or pain passing urine;  Passing urine more frequently   When did your concern begin? 6/25/2025   What medications or treatments have you used to help your symptoms? (Antibiotics, Cranberry products, Drinking more fluids, Painkillers, Other, None) Drinking more fluids   What does your urine look like? (Clear, Cloudy, Dark yellow, Light yellow, Pink or red (bloody), Foamy, Not sure) Not sure   Have you had any of the following symptoms during the past 24 hours?   Urgency (a sudden and uncontrollable urge to urinate) (None, Mild, Moderate, Severe) Severe   Frequency (going to the toilet very often) (None, Mild, Moderate, Severe) Severe   Burning pain when urinating (None, Mild, Moderate, Severe) Severe   Incomplete bladder emptying (still feel like you need to urinate again after urination) (None, Mild, Moderate, Severe) Severe   Pain in the lower belly when youre not urinating. (None, Mild, Moderate, Severe) Moderate   Please rate how much discomfort these symptoms have caused in the last 24 hours. (None, Mild, Moderate, Severe) Moderate   Have you had any of the following symptoms during the past 24 hours?   Blood seen in the urine (None, Mild, Moderate, Severe) None   Pain in the lower back on one or both sides  (flank pain) (None, Mild, Moderate, Severe) None   Abnormal Vaginal Discharge (abnormal amount, color and/or odor) (None, Mild, Moderate, Severe) None   Discharge from the opening you urinate from (urethra) when not urinating. (None, Mild, Moderate, Severe) None   Tell us how the symptoms have interfered with your every day activities/work in the past 24 hours. (None, Mild, Moderate, Severe) Mild   Do you have a fever? (Less than 100.4°F, 100.4°F or greater, No, Not sure) No   Are you a diabetic? No   If you are female, please tell us if you have any of the following right now (as youre completing the questionnaire): (Menstrual period (menses), PMS (Premenstrual syndrome),Signs of menopause such as hot flashes, Pregnancy, None) None   Do you have a history of kidney stones? Yes   Provide any information you feel is important to your history not asked above If urine sample is needed, I do not live far from [facility] and can do so   Please attach any relevant images or files (if you have performed a home test for UTI, please submit a photo of results)    Are you able to take your vitals? No         Encounter Diagnosis   Name Primary?    Acute cystitis without hematuria Yes        Orders Placed This Encounter   Procedures    Urinalysis     Standing Status:   Future     Expected Date:   6/26/2025     Expiration Date:   8/25/2026     Collection Type:   Urine, Clean Catch      Medications Ordered This Encounter   Medications    nitrofurantoin, macrocrystal-monohydrate, (MACROBID) 100 MG capsule     Sig: Take 1 capsule (100 mg total) by mouth 2 (two) times daily.     Dispense:  14 capsule     Refill:  0    phenazopyridine (PYRIDIUM) 100 MG tablet     Sig: Take 1 tablet (100 mg total) by mouth 3 (three) times daily as needed for Pain (dysuris).     Dispense:  10 tablet     Refill:  0     Please make sure pt is not pregnant        No follow-ups on file.

## 2025-06-27 ENCOUNTER — PATIENT MESSAGE (OUTPATIENT)
Dept: PRIMARY CARE CLINIC | Facility: CLINIC | Age: 40
End: 2025-06-27
Payer: COMMERCIAL

## 2025-06-28 ENCOUNTER — RESULTS FOLLOW-UP (OUTPATIENT)
Dept: PRIMARY CARE CLINIC | Facility: CLINIC | Age: 40
End: 2025-06-28

## 2025-06-30 ENCOUNTER — RESULTS FOLLOW-UP (OUTPATIENT)
Dept: NEUROLOGY | Facility: CLINIC | Age: 40
End: 2025-06-30

## 2025-07-03 ENCOUNTER — TELEPHONE (OUTPATIENT)
Dept: PSYCHOLOGY | Facility: CLINIC | Age: 40
End: 2025-07-03
Payer: COMMERCIAL

## 2025-07-03 ENCOUNTER — PATIENT MESSAGE (OUTPATIENT)
Dept: GASTROENTEROLOGY | Facility: CLINIC | Age: 40
End: 2025-07-03
Payer: COMMERCIAL

## 2025-07-03 DIAGNOSIS — R14.2 BELCHING: Primary | ICD-10-CM

## 2025-07-03 DIAGNOSIS — R10.13 EPIGASTRIC PAIN: ICD-10-CM

## 2025-07-06 ENCOUNTER — PATIENT MESSAGE (OUTPATIENT)
Dept: PSYCHOLOGY | Facility: CLINIC | Age: 40
End: 2025-07-06
Payer: COMMERCIAL

## 2025-07-07 ENCOUNTER — PATIENT MESSAGE (OUTPATIENT)
Dept: PRIMARY CARE CLINIC | Facility: CLINIC | Age: 40
End: 2025-07-07
Payer: COMMERCIAL

## 2025-07-07 DIAGNOSIS — F32.81 PMDD (PREMENSTRUAL DYSPHORIC DISORDER): Primary | ICD-10-CM

## 2025-07-07 NOTE — TELEPHONE ENCOUNTER
Pt. Notified or ref suggestion - pt. GYN does do pellet therapy - pt. Is not interested in pellets, wanting to discuss Testosterone injections weekly

## 2025-07-08 NOTE — TELEPHONE ENCOUNTER
Ref. To GYN at [facility] pended below - Dr. Sood (called her office to confirm she does treat HRT women)

## 2025-07-08 NOTE — TELEPHONE ENCOUNTER
I only see pt twice through E visit she usually see Marisel Mcdaniels  NP not sure why she need testosterone injection prob hormone related I would defer the tx to GYN or endocrinology

## 2025-07-09 ENCOUNTER — PATIENT MESSAGE (OUTPATIENT)
Dept: OBSTETRICS AND GYNECOLOGY | Facility: CLINIC | Age: 40
End: 2025-07-09
Payer: COMMERCIAL

## 2025-07-11 ENCOUNTER — CLINICAL SUPPORT (OUTPATIENT)
Dept: OBSTETRICS AND GYNECOLOGY | Facility: CLINIC | Age: 40
End: 2025-07-11
Payer: COMMERCIAL

## 2025-07-11 DIAGNOSIS — N95.1 MENOPAUSAL SYMPTOMS: Primary | ICD-10-CM

## 2025-07-13 NOTE — PROGRESS NOTES
Personal Information    Full Name: Shena Gomez 1985    PCP- Dr. Dixon Anderson    Medical History    Do you have a chronic medical condition? (e.g., diabetes, hypertension, elevated cholesterol, thyroid issues)   If yes, please specify:   No    2.   Do you have a history of hormone-related conditions or any type of cancer ? (e.g., endometriosis, breast cancer, or PCOS)?  If yes, please explain: Genetic Testing- Negative   Yes - Endometriosis    3.   Have you previously or are you currently taking hormone replacement therapy?   If yes, please list:   Yes - Previous use - Estrogen     4.   Do you have a history of an autoimmune disorders?   If yes, please list:  No    5.   Do you have any of these health conditions?      Neuro:   Migraine with vision loss or hemiplegia     Cardiovascular disease:   N/A     Pulmonary:   N/A    GI:  N/A    Renal:  Kidney Stones      Heme:  N/A     Menstrual History    At what age did you begin menstruating?   13    2.   Have you experienced any changes in your menstrual cycle in the last year?   If yes, please describe:   No    3.   When was your last menstrual period or age of last period?   N/A    4.   Have you had a hysterectomy or ablation?   If yes, do you still have ovaries?  Yes - Hysterectomy 2019- Ovaries intact      Symptoms Assesments      Are you experiencing any of the following symptoms:  Hot Flashes: Yes   Night Sweats: Yes   Vaginal Dryness or Pain/ Discomfort with [place]: Yes   Mood Swings: Yes   Anxiety or Depression: Yes   Sleep Disturbances: Yes   Fatigue: Yes   Weight Gain: Yes   Joint or Muscle Pain: Yes   Memory Issues, Brain Fog or Loss of Focus: Yes   Decreased Interest in Sex (Low Libido): Yes     Lifestyle Factors    How would you describe your diet?  Healthy- sugar cravings     2.   Do you exercise? If yes, how many days per week and for how many minutes?  No  What types of exercise do you do?    N/A     3.   Do you smoke or use other nicotine  products?    If yes, please specify frequency:   No    4.  Do you consume alcohol? If yes, please specify frequency:   No   If yes, what kind of alcohol (beer, wine, liquor)?  N/A      5.   How would you rate your stress levels?   High     6.   Do you take any supplements?   ? If yes, what supplements do you take (please include brand names)?  Yes - MV    Family History    Is there a family history of menopause-related conditions? (e.g., osteoporosis, breast cancer)  If yes, please specify  Yes - Maternal GM & Aunt Breast cancer     2.   Is there a family history of heart disease?   If yes, please specify   No    3.   Has any family member had a heart attack?   If yes, who and at what age?   Yes - Father - Open heart surgery     4.   Has any family member had a stroke?   If yes, who and what age?  No     5.   Has any family member had blood clots or a pulmonary embolism?  If yes, who and at what age?   No    Screening History    1.   Have you had a colonoscopy?  If yes, month or year:  Yes - 1/2024    2.   Have you had a mammogram?  If yes, month or year:  Yes - 2005    3.   Have you had an Annual/ Pap?   If yes, month or year:   Yes - 2018    4.   BMD (If patient is over 50)    No  ---------------------------------------    Spoke with patient for a total of 30 minutes during virtual visit.  Patient was guided through expectations and treatment options.     Questions answered. Encouraged to send message or call office with any questions/concerns. Verbalized understanding.       Radha

## 2025-07-15 ENCOUNTER — PATIENT MESSAGE (OUTPATIENT)
Dept: OBSTETRICS AND GYNECOLOGY | Facility: CLINIC | Age: 40
End: 2025-07-15
Payer: COMMERCIAL

## 2025-07-16 ENCOUNTER — TELEPHONE (OUTPATIENT)
Dept: PSYCHOLOGY | Facility: CLINIC | Age: 40
End: 2025-07-16
Payer: COMMERCIAL

## 2025-07-21 ENCOUNTER — OFFICE VISIT (OUTPATIENT)
Dept: PSYCHOLOGY | Facility: CLINIC | Age: 40
End: 2025-07-21
Payer: COMMERCIAL

## 2025-07-21 DIAGNOSIS — R63.2 BINGE EATING: ICD-10-CM

## 2025-07-21 DIAGNOSIS — F41.1 GENERALIZED ANXIETY DISORDER: Primary | ICD-10-CM

## 2025-07-21 DIAGNOSIS — F32.81 PMDD (PREMENSTRUAL DYSPHORIC DISORDER): ICD-10-CM

## 2025-07-21 PROCEDURE — 3044F HG A1C LEVEL LT 7.0%: CPT | Mod: CPTII,95,, | Performed by: NURSE PRACTITIONER

## 2025-07-21 PROCEDURE — G2211 COMPLEX E/M VISIT ADD ON: HCPCS | Mod: 95,,, | Performed by: NURSE PRACTITIONER

## 2025-07-21 PROCEDURE — 90833 PSYTX W PT W E/M 30 MIN: CPT | Mod: 95,,, | Performed by: NURSE PRACTITIONER

## 2025-07-21 PROCEDURE — 98006 SYNCH AUDIO-VIDEO EST MOD 30: CPT | Mod: 95,,, | Performed by: NURSE PRACTITIONER

## 2025-07-21 RX ORDER — DEXTROAMPHETAMINE SACCHARATE, AMPHETAMINE ASPARTATE, DEXTROAMPHETAMINE SULFATE AND AMPHETAMINE SULFATE 2.5; 2.5; 2.5; 2.5 MG/1; MG/1; MG/1; MG/1
10 TABLET ORAL DAILY
Qty: 30 TABLET | Refills: 0 | Status: SHIPPED | OUTPATIENT
Start: 2025-07-21 | End: 2025-08-20

## 2025-07-21 RX ORDER — SERTRALINE HYDROCHLORIDE 50 MG/1
50 TABLET, FILM COATED ORAL DAILY
Qty: 30 TABLET | Refills: 1 | Status: SHIPPED | OUTPATIENT
Start: 2025-07-21 | End: 2025-09-19

## 2025-07-21 NOTE — PROGRESS NOTES
"The patient location is: [facility]  The chief complaint leading to consultation is: Binge Eating, Anxiety, PMDD    Visit type: audiovisual    Each patient to whom he or she provides medical services by telemedicine is:  (1) informed of the relationship between the physician and patient and the respective role of any other health care provider with respect to management of the patient; and (2) notified that he or she may decline to receive medical services by telemedicine and may withdraw from such care at any time.    Notes:     Outpatient Psychiatry Follow-Up Visit ([facility])    07/21/2025    Clinical Status of Patient:  Outpatient (Ambulatory)    Chief Complaint:  Shena Gomez is a 39 y.o. female who presents today for follow-up of depression, anxiety, and PMDD.  Met with patient.     Current Medications:   Zoloft 50 mg Daily - NOT taking  Start Adderall 5 mg XR - NOT taking    Past Medication Trials:  Wellbutrin- agitation   Adderall - keeps her focused and alert, effective for binge eating, causes agitation, hair loss crash sensation when it wearing off.  Vyvanse- not tried, not filled due to price     Interval History and Content of Current Session:  Patient seen and chart reviewed. Last seen on 5/12/25    Changes at last visit:  Start Adderall 5 mg XR  Increase Zoloft to 50 mg Daily      History of Present Illness    HPI:  Patient attempted to discontinue all medications (Adderall and Zoloft) due to a preference for a "clean lifestyle." However, she restarted Zoloft and Adderall two days ago due to inability to manage PMDD symptoms, binge eating, and productivity issues without them.    Without medication, the patient experiences extreme agitation, irritability, and sensitivity to environmental stimuli (e.g., air conditioner noise). She reports being unable to manage PMDD symptoms independently and experiences a recurrence of binge eating, particularly in the mid-afternoon or early " evening.    Patient experiences decreased productivity, increased lethargy, and a return of impulsive behaviors such as hair picking when not taking Adderall. These impulses return when the medication wears off, particularly in the evening.    The extended-release Adderall (5mg) helps with binge eating and impulsivity but wears off quickly. Patient expresses concern about potential addiction to Adderall but notes that she does not feel addicted and still has medication left from her previous prescription.    Patient mentions consulting with various healthcare providers about potential hormonal imbalances, particularly low free testosterone, which some providers believe may be contributing to her PMDD, fatigue, and binge eating issues.    Patient reports experiencing hair loss with immediate-release Adderall but not with the extended-release formulation. She also mentions being deficient in some minerals based on recent lab work.      ROS:  General: -fever, -chills, +fatigue, -weight gain, -weight loss  Eyes: -vision changes, -redness, -discharge  ENT: -ear pain, -nasal congestion, -sore throat  Cardiovascular: -chest pain, -palpitations, -lower extremity edema  Respiratory: -cough, -shortness of breath  Gastrointestinal: -abdominal pain, -nausea, -vomiting, -diarrhea, -constipation, -blood in stool  Genitourinary: -dysuria, -hematuria, -frequency  Musculoskeletal: -joint pain, -muscle pain  Skin: -rash, -lesion  Neurological: -headache, -dizziness, -numbness, -tingling  Psychiatric: -anxiety, -depression, -sleep difficulty, +agitation, +irritability, +food binging, +obsessive behavior, +abnormal hair loss         Denies SI/HI/AVH. Denies adverse effects from medication  Pt reports taking medications as prescribed and behaving appropriately during interview today.    Pt appears:  Appropriate attire and affet    Mood:  Content, Happy     Sleep:  Naps 2 hrs, feels fatigue most days    Appetite:  Binge eating,     Self  Rates Depression: 6-7/10  Self Rated Anxiety:  0-5 (5 with PMDD) /10      Psychotherapy:  Target symptoms: depression, anxiety , PMDD  Why chosen therapy is appropriate versus another modality: relevant to diagnosis, evidence based practice  Outcome monitoring methods: self-report, observation  Therapeutic intervention type: insight oriented psychotherapy, supportive psychotherapy  Topics discussed/themes: symptom recognition  The patient's response to the intervention is accepting, motivated. The patient's progress toward treatment goals is good.   Duration of intervention: 16 minutes.    Review of Systems   PSYCHIATRIC: Pertinant items are noted in the narrative.        Past Medical, Family and Social History: The patient's past medical, family and social history have been reviewed and updated as appropriate within the electronic medical record - see encounter notes.    Compliance: yes    Side effects: None    Risk Parameters:  Patient reports no suicidal ideation  Patient reports no homicidal ideation  Patient reports no self-injurious behavior  Patient reports no violent behavior    Exam (detailed: at least 9 elements; comprehensive: all 15 elements)   Constitutional  Vitals:  Most recent vital signs, dated less than 90 days prior to this appointment, were reviewed.   There were no vitals filed for this visit.     General:  unremarkable, age appropriate     Musculoskeletal  Muscle Strength/Tone:  not examined   Gait & Station:  non-ataxic     Psychiatric  Physical Exam    Appearance: Appears stated age. Well-groomed. Well-nourished.  Speech: Normal rate. Normal volume. Spontaneous and fluid.  Affect: Appropriate.  Thought Content: No evidence of aggression. No evidence of homicidal ideation. No evidence of homicidal plan. No evidence of homicidal intent. No evidence of suicidal ideation. No evidence of suicidal plan. No evidence of suicidal intent. No evidence of delusions.  Memory: Recent memory intact. Remote  memory intact.  Behavior: Cooperative. Good eye contact. Engaged. Pleasant.  Mood: Euthymic.  Thought Form: Linear thinking. Goal oriented and directed.  Perception: No perceptual abnormalities noted.  Judgement: Evident of poor decision making.  Insight: Good insight into symptoms. Good insight into treatment options.  Cognition: A&Ox3. Normal attention span. Average fund of knowledge.  Motor: No gross motor abnormalities.         Assessment and Diagnosis   Status/Progress: Based on the examination today, the patient's problem(s) is/are treatment resistant.  New problems have not been presented today.   Co-morbidities, Diagnostic uncertainty, and Lack of compliance are not complicating management of the primary condition.  There are no active rule-out diagnoses for this patient at this time.     General Impression: Shena Gomez, a 39 y.o. female, presenting for follow up visit for Obsessive Compulsive Disorder, Generalized Anxiety, and PMDD.   Presents 04/07/2025 - d/c Prozac, D/c adderall IR, Start Zoloft 25 mg daily titrate to effect, consider changing to Adderall 5 mg XR at next visit.   Presents 05/12/2025 - Start Adderall 5 mg XR, Increase Zoloft to 50 mg Daily  Presents 7/21/25- Increase Adderall XR to 10 mg Daily, restart Zoloft 50 mg Daily      ICD-10-CM ICD-9-CM    1. Generalized anxiety disorder  F41.1 300.02       2. Binge eating  R63.2 783.6 dextroamphetamine-amphetamine (ADDERALL) 10 mg Tab      3. PMDD (premenstrual dysphoric disorder)  F32.81 625.4 sertraline (ZOLOFT) 50 MG tablet        Intervention/Counseling/Treatment Plan   Assessment & Plan    IMPRESSION:  Assessed response to previous medication changes (Zoloft increase to 50 mg, Adderall XR 5 mg).  Evaluated decision to discontinue medications and subsequent restart.  Increased Adderall XR from 5 mg to 10 mg daily to address binge eating and impulse control issues.  Discussed alternative stimulant options (methylphenidate) if Adderall XR  is not well-tolerated.  Plan to reassess PMDD symptoms after restarting Zoloft regularly.  Considered potential future increase of Zoloft dose during premenstrual week if PMDD symptoms persist.    ATTENTION-DEFICIT HYPERACTIVITY DISORDER (ADHD):  - Explained low risk of addiction with current low dose of Adderall and how stimulants work by attaching to dopamine receptors, potentially reducing impulsive behaviors and binge eating. Clarified that methylphenidate, while a stimulant, is different from illicit methamphetamine. Increased Adderall XR from 5 mg to 10 mg daily to address binge eating and impulse control issues. Contact the office in a few days to report any issues or concerns with increased Adderall XR dose.    PREMENSTRUAL DYSPHORIC DISORDER (PMDD):  - Patient to track menstrual cycle to identify PMDD symptom patterns.    DEPRESSION:  - Continue Zoloft 50 mg daily. If restarting causes stomach issues, break 50 mg tablet in half (25 mg) for 3-4 days before returning to full 50 mg dose.    FOLLOW-UP:  - Follow up in 1 month to reassess symptoms after restarting Zoloft. Medical assistant will call to schedule follow-up visit.         Discussed diagnosis, risk and benefits of proposed treatment above vs alternative treatment vs no treatment, and potential side effects of these treatments, and the inherent unpredictability of individual responses to these treatments. The patient expresses understanding and gives informed consent to pursue treatment at this time, believing that the potential benefits outweigh the potential risks. Patient has no other questions. Risks/adverse effects at this time include but are not limited to: GI side effects, sexual dysfunction, activation vs sedation, triggering of suicidal ideation, and serotonin syndrome.   Patient voices understanding and agreement with this plan  Provided crisis numbers  Encouraged patient to keep future appointments  Instruct patient to call or message  with questions  In the event of an emergency, including suicidal ideation, patient was advised to go to the emergency room  This note was generated with the assistance of ambient listening technology. Verbal consent was obtained by the patient and accompanying visitor(s) for the recording of patient appointment to facilitate this note. I attest to having reviewed and edited the generated note for accuracy, though some syntax or spelling errors may persist. Please contact the author of this note for any clarification.        Return to Clinic: 1 month        Zoila Cheng DNP, JOSIAHP, NATHALYP

## 2025-07-27 ENCOUNTER — PATIENT MESSAGE (OUTPATIENT)
Dept: PSYCHOLOGY | Facility: CLINIC | Age: 40
End: 2025-07-27
Payer: COMMERCIAL

## 2025-07-27 DIAGNOSIS — R63.2 BINGE EATING: Primary | ICD-10-CM

## 2025-07-28 RX ORDER — DEXTROAMPHETAMINE SACCHARATE, AMPHETAMINE ASPARTATE MONOHYDRATE, DEXTROAMPHETAMINE SULFATE AND AMPHETAMINE SULFATE 2.5; 2.5; 2.5; 2.5 MG/1; MG/1; MG/1; MG/1
10 CAPSULE, EXTENDED RELEASE ORAL EVERY MORNING
Qty: 30 CAPSULE | Refills: 0 | Status: SHIPPED | OUTPATIENT
Start: 2025-07-28 | End: 2025-08-27

## 2025-08-14 ENCOUNTER — OFFICE VISIT (OUTPATIENT)
Dept: OBSTETRICS AND GYNECOLOGY | Facility: CLINIC | Age: 40
End: 2025-08-14
Attending: OBSTETRICS & GYNECOLOGY
Payer: COMMERCIAL

## 2025-08-14 ENCOUNTER — PATIENT MESSAGE (OUTPATIENT)
Dept: OBSTETRICS AND GYNECOLOGY | Facility: CLINIC | Age: 40
End: 2025-08-14

## 2025-08-14 VITALS
WEIGHT: 114 LBS | HEART RATE: 82 BPM | DIASTOLIC BLOOD PRESSURE: 81 MMHG | BODY MASS INDEX: 22.38 KG/M2 | HEIGHT: 60 IN | SYSTOLIC BLOOD PRESSURE: 119 MMHG

## 2025-08-14 DIAGNOSIS — F32.81 PMDD (PREMENSTRUAL DYSPHORIC DISORDER): ICD-10-CM

## 2025-08-14 PROCEDURE — 3044F HG A1C LEVEL LT 7.0%: CPT | Mod: CPTII,S$GLB,, | Performed by: OBSTETRICS & GYNECOLOGY

## 2025-08-14 PROCEDURE — 3074F SYST BP LT 130 MM HG: CPT | Mod: CPTII,S$GLB,, | Performed by: OBSTETRICS & GYNECOLOGY

## 2025-08-14 PROCEDURE — 99214 OFFICE O/P EST MOD 30 MIN: CPT | Mod: S$GLB,,, | Performed by: OBSTETRICS & GYNECOLOGY

## 2025-08-14 PROCEDURE — 3079F DIAST BP 80-89 MM HG: CPT | Mod: CPTII,S$GLB,, | Performed by: OBSTETRICS & GYNECOLOGY

## 2025-08-14 PROCEDURE — 3008F BODY MASS INDEX DOCD: CPT | Mod: CPTII,S$GLB,, | Performed by: OBSTETRICS & GYNECOLOGY

## 2025-08-14 PROCEDURE — 1159F MED LIST DOCD IN RCRD: CPT | Mod: CPTII,S$GLB,, | Performed by: OBSTETRICS & GYNECOLOGY

## 2025-08-14 PROCEDURE — 99999 PR PBB SHADOW E&M-EST. PATIENT-LVL III: CPT | Mod: PBBFAC,,, | Performed by: OBSTETRICS & GYNECOLOGY

## 2025-08-14 RX ORDER — ESTRADIOL 0.03 MG/D
FILM, EXTENDED RELEASE TRANSDERMAL
Qty: 8 PATCH | Refills: 11 | Status: SHIPPED | OUTPATIENT
Start: 2025-08-14

## 2025-08-15 ENCOUNTER — TELEPHONE (OUTPATIENT)
Dept: PSYCHOLOGY | Facility: CLINIC | Age: 40
End: 2025-08-15
Payer: COMMERCIAL

## 2025-08-22 ENCOUNTER — PROCEDURE VISIT (OUTPATIENT)
Dept: NEUROLOGY | Facility: CLINIC | Age: 40
End: 2025-08-22
Payer: COMMERCIAL

## 2025-08-22 DIAGNOSIS — G44.86 CERVICOGENIC HEADACHE: ICD-10-CM

## 2025-08-22 DIAGNOSIS — G43.719 INTRACTABLE CHRONIC MIGRAINE WITHOUT AURA AND WITHOUT STATUS MIGRAINOSUS: Primary | ICD-10-CM

## 2025-09-05 ENCOUNTER — TELEPHONE (OUTPATIENT)
Dept: PSYCHOLOGY | Facility: CLINIC | Age: 40
End: 2025-09-05
Payer: COMMERCIAL

## (undated) DEVICE — SET TRI-LUMEN FILTERED TUBE

## (undated) DEVICE — SUT 0 VICRYL PLUS CT-1 27IN

## (undated) DEVICE — NDL INSUF ULTRA VERESS 120MM

## (undated) DEVICE — TROCAR ENDO KII FIOS 12X100MM

## (undated) DEVICE — SOL NS 1000CC

## (undated) DEVICE — JELLY SURGILUBE 5GR

## (undated) DEVICE — PACK LAPAROSCOPY BAPTIST

## (undated) DEVICE — ENDOSTITCH INSTRUMENT

## (undated) DEVICE — SOL 9P NACL IRR PIC IL

## (undated) DEVICE — SEE MEDLINE ITEM 146296

## (undated) DEVICE — KIT WING PAD POSITIONING

## (undated) DEVICE — SPONGE LAP 18X18 PREWASHED

## (undated) DEVICE — TIP RUMI BLUE DISPOSABLE 5/BX

## (undated) DEVICE — SYR 10CC LUER LOCK

## (undated) DEVICE — UNDERGLOVE BIOGEL PI SZ 6.5 LF

## (undated) DEVICE — PAD ABD 8X10 STERILE

## (undated) DEVICE — SUT MCRYL PLUS 4-0 PS2 27IN

## (undated) DEVICE — GLOVE BIOGEL SKINSENSE PI 6.0

## (undated) DEVICE — ELECTRODE REM PLYHSV RETURN 9

## (undated) DEVICE — SEE MEDLINE ITEM 156930

## (undated) DEVICE — CLOSURE SKIN STERI STRIP 1/2X4

## (undated) DEVICE — SEE MEDLINE ITEM 157110

## (undated) DEVICE — TROCAR KII FIOS 5MM X 100MM

## (undated) DEVICE — ENDOSTITCH POLYSORB 0 ES-9

## (undated) DEVICE — NDL HYPO REG 25G X 1 1/2

## (undated) DEVICE — SYR 50CC LL

## (undated) DEVICE — SEALER LIGASURE LAP 37CM 5MM

## (undated) DEVICE — SUT VICRYL+ 27 UR-6 VIOL

## (undated) DEVICE — TIP RUMI KOH-EFFICIENT 3.0

## (undated) DEVICE — DRAPE INCISE IOBAN 2 23X17IN

## (undated) DEVICE — SEE MEDLINE ITEM 152622

## (undated) DEVICE — IRRIGATOR ENDOSCOPY DISP.

## (undated) DEVICE — SOL STRL WATER INJ 1000ML BG

## (undated) DEVICE — DRESSING LEUKOPLAST FLEX 1X3IN

## (undated) DEVICE — PORT ACCESS 5MM W/120MM

## (undated) DEVICE — SET CYSTO IRRIGATION UNIV SPIK